# Patient Record
Sex: MALE | Race: WHITE | Employment: OTHER | URBAN - METROPOLITAN AREA
[De-identification: names, ages, dates, MRNs, and addresses within clinical notes are randomized per-mention and may not be internally consistent; named-entity substitution may affect disease eponyms.]

---

## 2017-01-06 ENCOUNTER — PRE VISIT (OUTPATIENT)
Dept: CARDIOLOGY | Facility: CLINIC | Age: 72
End: 2017-01-06

## 2017-01-09 ENCOUNTER — OFFICE VISIT (OUTPATIENT)
Dept: CARDIOLOGY | Facility: CLINIC | Age: 72
End: 2017-01-09
Payer: MEDICARE

## 2017-01-09 VITALS
HEART RATE: 80 BPM | SYSTOLIC BLOOD PRESSURE: 120 MMHG | HEIGHT: 68 IN | BODY MASS INDEX: 22.13 KG/M2 | DIASTOLIC BLOOD PRESSURE: 64 MMHG | WEIGHT: 146 LBS

## 2017-01-09 DIAGNOSIS — I48.92 ATRIAL FLUTTER, UNSPECIFIED TYPE (H): ICD-10-CM

## 2017-01-09 DIAGNOSIS — I36.1 NON-RHEUMATIC TRICUSPID VALVE INSUFFICIENCY: ICD-10-CM

## 2017-01-09 DIAGNOSIS — I48.19 PERSISTENT ATRIAL FIBRILLATION (H): ICD-10-CM

## 2017-01-09 DIAGNOSIS — I48.0 PAROXYSMAL ATRIAL FIBRILLATION (H): Primary | ICD-10-CM

## 2017-01-09 DIAGNOSIS — I10 ESSENTIAL HYPERTENSION: ICD-10-CM

## 2017-01-09 PROCEDURE — 99215 OFFICE O/P EST HI 40 MIN: CPT | Mod: 25 | Performed by: INTERNAL MEDICINE

## 2017-01-09 PROCEDURE — 93000 ELECTROCARDIOGRAM COMPLETE: CPT | Performed by: INTERNAL MEDICINE

## 2017-01-09 RX ORDER — TORSEMIDE 10 MG/1
5 TABLET ORAL DAILY
Qty: 30 TABLET | Refills: 11 | COMMUNITY
Start: 2017-01-09 | End: 2017-10-12

## 2017-01-09 RX ORDER — AMIODARONE HYDROCHLORIDE 200 MG/1
200 TABLET ORAL DAILY
Qty: 30 TABLET | Refills: 3 | COMMUNITY
Start: 2017-01-09 | End: 2017-03-20

## 2017-01-09 NOTE — MR AVS SNAPSHOT
After Visit Summary   1/9/2017    Misael Fischer    MRN: [id number]           Patient Information     Date Of Birth          1945        Visit Information        Provider Department      1/9/2017 12:15 PM Prabhakar Castro MD [facility]        Today's Diagnoses     Paroxysmal atrial fibrillation (H)    -  1     Atrial flutter, unspecified type (H)         Non-rheumatic tricuspid valve insufficiency         Essential hypertension         Persistent atrial fibrillation (H)            Follow-ups after your visit        Additional Services     Follow-Up with Cardiologist                 Your next 10 appointments already scheduled     Mar 03, 2017  8:30 AM   Ech Complete with [id number] ([facility])    [address]  [facility] [address] [phone]           1.  Please bring or wear a comfortable two-piece outfit. 2.  You may eat, drink and take your normal medicines. 3.  For any questions that cannot be answered, please contact the ordering physician ***Please check-in at the [facility] located in [address] in the [facility] building. When you are finished registering, please go to [address] and have a seat. The technician will call your name for the test.            Mar 06, 2017 12:15 PM   Return Visit with Prabhakar Castro MD   [facility] ([facility])    [address]  [facility] [id number] [phone]              Future tests that were ordered for you today     Open Future Orders        Priority Expected Expires Ordered    Echocardiogram Routine 2/8/2017 1/9/2018 1/9/2017    Follow-Up with Cardiologist Routine 2/8/2017 1/9/2018 1/9/2017            Who to contact     If you have questions or need follow up information about today's clinic visit or your schedule please contact  "[facility] [place] directly at [phone].  Normal or non-critical lab and imaging results will be communicated to you by MyChart, letter or phone within 4 business days after the clinic has received the results. If you do not hear from us within 7 days, please contact the clinic through Every1Mobilehart or phone. If you have a critical or abnormal lab result, we will notify you by phone as soon as possible.  Submit refill requests through Tap2print or call your pharmacy and they will forward the refill request to us. Please allow 3 business days for your refill to be completed.          Additional Information About Your Visit        Every1MobileharKigo Information     Tap2print gives you secure access to your electronic health record. If you see a primary care provider, you can also send messages to your care team and make appointments. If you have questions, please call your primary care clinic.  If you do not have a primary care provider, please call [phone] and they will assist you.        Care EveryWhere ID     This is your Care EveryWhere ID. This could be used by other organizations to access your [facility] medical records  [id number]        Your Vitals Were     Pulse Height BMI (Body Mass Index)             80 1.727 m (5' 8\") 22.20 kg/m2          Blood Pressure from Last 3 Encounters:   01/09/17 120/64   12/06/16 118/92   11/17/16 116/80    Weight from Last 3 Encounters:   01/09/17 66.225 kg (146 lb)   12/06/16 70.761 kg (156 lb)   11/17/16 69.718 kg (153 lb 11.2 oz)              We Performed the Following     EKG 12-lead complete w/read - Clinics (performed today)          Today's Medication Changes          These changes are accurate as of: 1/9/17  1:02 PM.  If you have any questions, ask your nurse or doctor.               These medicines have changed or have updated prescriptions.        Dose/Directions    amiodarone 200 MG tablet   Commonly known as:  PACERONE/CODARONE   This may have " changed:  Another medication with the same name was removed. Continue taking this medication, and follow the directions you see here.   Used for:  Paroxysmal atrial fibrillation (H)   Changed by:  Prabhakar Castro MD        Dose:  200 mg   Take 1 tablet (200 mg) by mouth daily   Quantity:  30 tablet   Refills:  3       torsemide 10 MG tablet   Commonly known as:  DEMADEX   This may have changed:  when to take this   Used for:  Non-rheumatic tricuspid valve insufficiency   Changed by:  Prabhakar Castro MD        Dose:  10 mg   Take 1 tablet (10 mg) by mouth daily Down to 5mg daily   Quantity:  30 tablet   Refills:  11         Stop taking these medicines if you haven't already. Please contact your care team if you have questions.     verapamil 120 MG 24 hr capsule   Commonly known as:  VERELAN   Stopped by:  Prabhakar Castro MD                Where to get your medicines      These medications were sent to [facility] - [address] [address]     Phone:  [phone]    - apixaban ANTICOAGULANT 5 MG tablet             Primary Care Provider Office Phone # Fax #    [facility] [phone] [address] [facility] [address]        Thank you!     Thank you for choosing [facility] [place]  for your care. Our goal is always to provide you with excellent care. Hearing back from our patients is one way we can continue to improve our services. Please take a few minutes to complete the written survey that you may receive in the mail after your visit with us. Thank you!             Your Updated Medication List - Protect others around you: Learn how to safely use, store and throw away your medicines at www.disposemymeds.org.          This list is accurate as of: 1/9/17  1:02 PM.  Always use your most recent med list.                   Brand Name Dispense Instructions for use     amiodarone 200 MG tablet    PACERONE/CODARONE    30 tablet    Take 1 tablet (200 mg) by mouth daily       apixaban ANTICOAGULANT 5 MG tablet    ELIQUIS    60 tablet    Take 1 tablet (5 mg) by mouth 2 times daily       torsemide 10 MG tablet    DEMADEX    30 tablet    Take 1 tablet (10 mg) by mouth daily Down to 5mg daily

## 2017-01-09 NOTE — Clinical Note
1/9/2017    [facility]  [address] [place] [address]    RE: Misael Simpsons       Dear Colleague,    I had the pleasure of seeing Misael Fischer in the [facility].    Mr. Fischer is a 71-year-old gentleman with a history of atrial flutter, status post flutter ablation, status post AVNRT ablation, tachycardia-induced cardiomyopathy with eventual improvement in his ejection fraction, RV dysfunction of unknown etiology which [facility], severe tricuspid regurgitation related to RV annular dilatation, along with chronic kidney disease who returns in close followup.      The patient had somehow become lost to followup for me and had been seeing physicians at the [facility] for his cardiac abnormalities.  He was initially scheduled to undergo tricuspid valve replacement with Dr. Mays; however, with medical therapy the patient was feeling better and had deferred on tricuspid valve replacement.  In reviewing the last note from Dr. Marcial from the Department of Cardiology at [facility] the patient was scheduled to have a repeat transthoracic echocardiogram and followup with the cardiologist at [facility] in roughly October.  He did not keep this appointment.  He was also scheduled to follow up with Nephrology as there was a question of possible light chain disease.  He also did not keep this followup.      Mr. Fischer has previously seen Dr. Ivan Larkin for his atrial flutter and he had a recurrence of his generalized malaise and some fluid retention.  He was worried that his atrial flutter had come back and therefore was seen by Dr. Larkin and was noted to be in atrial fibrillation.  He was set up with a KP-guided cardioversion, but on the KP the patient had evidence of left atrial appendage thrombus.  He was eventually placed on anticoagulation and a repeat transesophageal echocardiogram showed no evidence for persistent thrombus and he subsequently  underwent successful cardioversion by Dr. El in December.  He returns in close clinical followup and states that he has been feeling markedly better since the cardioversion.  His generalized malaise and fluid retention have improved.      The patient is currently on amiodarone 200 mg twice a day and was unaware that he was supposed to switch to 200 mg daily.  He has also been taking Eliquis since his cardioversion without any bleeding difficulties.      We performed a 12-lead ECG in the office today which shows sinus rhythm.      Please see my separate note with his full physical examination.     Outpatient Encounter Prescriptions as of 1/9/2017   Medication Sig Dispense Refill     apixaban ANTICOAGULANT (ELIQUIS) 5 MG tablet Take 1 tablet (5 mg) by mouth 2 times daily 60 tablet 11     amiodarone (PACERONE/CODARONE) 200 MG tablet Take 1 tablet (200 mg) by mouth daily 30 tablet 3     torsemide (DEMADEX) 10 MG tablet Take 1 tablet (10 mg) by mouth daily Down to 5mg daily 30 tablet 11     [DISCONTINUED] AMIODARONE HCL PO Take 200 mg by mouth 2 times daily       [DISCONTINUED] verapamil (VERELAN) 120 MG 24 hr capsule Take 1 capsule (120 mg) by mouth At Bedtime       [DISCONTINUED] torsemide (DEMADEX) 10 MG tablet Take 10 mg by mouth Down to 5mg daily       [DISCONTINUED] apixaban ANTICOAGULANT (ELIQUIS) 5 MG tablet Take 1 tablet (5 mg) by mouth 2 times daily 60 tablet 3     No facility-administered encounter medications on file as of 1/9/2017.      IMPRESSION AND PLAN:   1.  Paroxysmal atrial fibrillation - Mr. Fischer has an elevated CHADS-VASc score of at least 2 and given his prior left atrial appendage thrombus while in atrial fibrillation, I would recommend lifelong anticoagulation.  I did discuss this recommendation with the patient at length and he has agreed to remain on Eliquis.  I sent a prescription in for Eliquis 5 mg twice a day.  The patient is more than 60 kilograms and has never had a creatinine greater  than 2.5.  I believe that 5 mg b.i.d. dosing is the correct dose for the patient.  As he does not do well when he reverts to atrial fibrillation or atrial flutter I have elected to continue with amiodarone but have cut the dose to 200 mg daily.  When I see him back in roughly 1 month, we will consider going to 100 mg daily given his body habitus.   2.  LV dysfunction - Mr. Fischer had a slight worsening in his LV function when he reverted to atrial fibrillation.  His last assessment of his ejection fraction showed an EF of 45%.  I will plan to repeat a transthoracic echocardiogram in 4-6 weeks now that he is maintaining sinus rhythm.  I plan to see him back after this test has been completed.   3.  RV dysfunction - The patient had a comprehensive evaluation at both the [facility] and through [facility] to try to attempt to determine the etiology of his RV dysfunction.  It is now believed that it is likely related to progressive annular dilatation and tricuspid regurgitation.  I will plan to reassess his RV function and tricuspid regurgitation with his repeat transthoracic echocardiogram in 1 month.  Of note, the patient has previously turned down surgery for his tricuspid valve regurgitation at the [facility].  He has also not followed back with the cardiologist at the [facility] and would prefer to follow with us moving forward.   4.  Chronic kidney disease - The patient may have some question of light chain disease on his Nephrology workup.  At this time, I strongly encouraged the patient to follow back with the nephrologist at the [facility].  I have also asked him to establish with a primary care physician to help coordinate his care.   5.  Atrial flutter status post flutter ablation - There is no recurrent atrial flutter seen.   6.  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   7.  Right heart failure - The patient appears relatively euvolemic at this time.  I will continue his Demadex 10  mg daily unchanged at the present time.      Again, thank you for allowing me to participate in the care of your patient.      Sincerely,    Prabhakar Castro MD     [facility]

## 2017-01-09 NOTE — PROGRESS NOTES
HISTORY OF PRESENT ILLNESS:  Mr. Fischer is a 71-year-old gentleman with a history of atrial flutter, status post flutter ablation, status post AVNRT ablation, tachycardia-induced cardiomyopathy with eventual improvement in his ejection fraction, RV dysfunction of unknown etiology which has been evaluated at the [facility], severe tricuspid regurgitation related to RV annular dilatation, along with chronic kidney disease who returns in close followup.      The patient had somehow become lost to followup for me and had been seeing physicians at the [facility] for his cardiac abnormalities.  He was initially scheduled to undergo tricuspid valve replacement with Dr. Mays; however, with medical therapy the patient was feeling better and had deferred on tricuspid valve replacement.  In reviewing the last note from Dr. Marcial from the Department of Cardiology at [facility] the patient was scheduled to have a repeat transthoracic echocardiogram and followup with the cardiologist at [place] in roughly October.  He did not keep this appointment.  He was also scheduled to follow up with Nephrology as there was a question of possible light chain disease.  He also did not keep this followup.      Mr. Fischer has previously seen Dr. Ivan Larkin for his atrial flutter and he had a recurrence of his generalized malaise and some fluid retention.  He was worried that his atrial flutter had come back and therefore was seen by Dr. Larkin and was noted to be in atrial fibrillation.  He was set up with a KP-guided cardioversion, but on the KP the patient had evidence of left atrial appendage thrombus.  He was eventually placed on anticoagulation and a repeat transesophageal echocardiogram showed no evidence for persistent thrombus and he subsequently underwent successful cardioversion by Dr. El in December.  He returns in close clinical followup and states that he has been feeling markedly better since the cardioversion.  His generalized  malaise and fluid retention have improved.      The patient is currently on amiodarone 200 mg twice a day and was unaware that he was supposed to switch to 200 mg daily.  He has also been taking Eliquis since his cardioversion without any bleeding difficulties.      We performed a 12-lead ECG in the office today which shows sinus rhythm.      Please see my separate note with his full physical examination.      IMPRESSION AND PLAN:   1.  Paroxysmal atrial fibrillation - Mr. Fischer has an elevated CHADS-VASc score of at least 2 and given his prior left atrial appendage thrombus while in atrial fibrillation, I would recommend lifelong anticoagulation.  I did discuss this recommendation with the patient at length and he has agreed to remain on Eliquis.  I sent a prescription in for Eliquis 5 mg twice a day.  The patient is more than 60 kilograms and has never had a creatinine greater than 2.5.  I believe that 5 mg b.i.d. dosing is the correct dose for the patient.  As he does not do well when he reverts to atrial fibrillation or atrial flutter I have elected to continue with amiodarone but have cut the dose to 200 mg daily.  When I see him back in roughly 1 month, we will consider going to 100 mg daily given his body habitus.   2.  LV dysfunction - Mr. Fischer had a slight worsening in his LV function when he reverted to atrial fibrillation.  His last assessment of his ejection fraction showed an EF of 45%.  I will plan to repeat a transthoracic echocardiogram in 4-6 weeks now that he is maintaining sinus rhythm.  I plan to see him back after this test has been completed.   3.  RV dysfunction - The patient had a comprehensive evaluation at both the [facility] and through [facility] to try to attempt to determine the etiology of his RV dysfunction.  It is now believed that it is likely related to progressive annular dilatation and tricuspid regurgitation.  I will plan to reassess his RV function and tricuspid regurgitation  with his repeat transthoracic echocardiogram in 1 month.  Of note, the patient has previously turned down surgery for his tricuspid valve regurgitation at the [facility].  He has also not followed back with the cardiologist at the [facility] and would prefer to follow with us moving forward.   4.  Chronic kidney disease - The patient may have some question of light chain disease on his Nephrology workup.  At this time, I strongly encouraged the patient to follow back with the nephrologist at the [facility].  I have also asked him to establish with a primary care physician to help coordinate his care.   5.  Atrial flutter status post flutter ablation - There is no recurrent atrial flutter seen.   6.  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   7.  Right heart failure - The patient appears relatively euvolemic at this time.  I will continue his Demadex 10 mg daily unchanged at the present time.         TYRONE NEWBY MD             D: 2017 13:00   T: 2017 13:23   MT: DAWNA      Name:     TREY BURGER   MRN:      [id number]        Account:      [id number]   :      1945           Service Date: 2017      Document: [id number]

## 2017-01-09 NOTE — PROGRESS NOTES
HPI and Plan:   See dictation    Orders Placed This Encounter   Procedures     Follow-Up with Cardiologist     EKG 12-lead complete w/read - Clinics (performed today)     Echocardiogram       Orders Placed This Encounter   Medications     DISCONTD: AMIODARONE HCL PO     Sig: Take 200 mg by mouth 2 times daily     apixaban ANTICOAGULANT (ELIQUIS) 5 MG tablet     Sig: Take 1 tablet (5 mg) by mouth 2 times daily     Dispense:  60 tablet     Refill:  11     amiodarone (PACERONE/CODARONE) 200 MG tablet     Sig: Take 1 tablet (200 mg) by mouth daily     Dispense:  30 tablet     Refill:  3     torsemide (DEMADEX) 10 MG tablet     Sig: Take 1 tablet (10 mg) by mouth daily Down to 5mg daily     Dispense:  30 tablet     Refill:  11       Medications Discontinued During This Encounter   Medication Reason     verapamil (VERELAN) 120 MG 24 hr capsule      AMIODARONE HCL PO      apixaban ANTICOAGULANT (ELIQUIS) 5 MG tablet Reorder     torsemide (DEMADEX) 10 MG tablet Reorder         Encounter Diagnoses   Name Primary?     Atrial flutter, unspecified type (H)      Non-rheumatic tricuspid valve insufficiency      Essential hypertension      Paroxysmal atrial fibrillation (H) Yes     Persistent atrial fibrillation (H)        CURRENT MEDICATIONS:  Current Outpatient Prescriptions   Medication Sig Dispense Refill     apixaban ANTICOAGULANT (ELIQUIS) 5 MG tablet Take 1 tablet (5 mg) by mouth 2 times daily 60 tablet 11     amiodarone (PACERONE/CODARONE) 200 MG tablet Take 1 tablet (200 mg) by mouth daily 30 tablet 3     torsemide (DEMADEX) 10 MG tablet Take 1 tablet (10 mg) by mouth daily Down to 5mg daily 30 tablet 11     [DISCONTINUED] AMIODARONE HCL PO Take 200 mg by mouth 2 times daily       [DISCONTINUED] torsemide (DEMADEX) 10 MG tablet Take 10 mg by mouth Down to 5mg daily         ALLERGIES     Allergies   Allergen Reactions     Ace Inhibitors      Due to CKD, renal insufficiency       PAST MEDICAL HISTORY:  Past Medical History    Diagnosis Date     Pigment deposition      Elevated PSA      BPH (benign prostatic hyperplasia)      Atrial flutter (H) 11/23/15     Cardiomyopathy (H)      Echo 11/2015- EF 35-40%     Systolic heart failure (H)      Echo 11/2015- EF 35-40%     CAD (coronary artery disease)      Abnormal Nuc 2014     CKD (chronic kidney disease)      not on ACE/ARB due to insufficiency     Mild pulmonic regurgitation and RV dysfunction by prior echocardiogram      Tricuspid regurgitation      Mod 2016     Malaria      Atrial flutter (H)        PAST SURGICAL HISTORY:  Past Surgical History   Procedure Laterality Date     Orthopedic surgery Left 1957     foot injury repair     Cardioversion  12/14/15     H ablation atrial flutter  3/21/16     Anesthesia cardioversion N/A 12/6/2016     Procedure: ANESTHESIA CARDIOVERSION;  Surgeon: GENERIC ANESTHESIA PROVIDER;  Location:  OR     C yair (transesophageal echo)  12/6/16       FAMILY HISTORY:  Family History   Problem Relation Age of Onset     Prostate Cancer Father 73     C.A.D. Mother        SOCIAL HISTORY:  Social History     Social History     Marital Status:      Spouse Name: N/A     Number of Children: N/A     Years of Education: N/A     Social History Main Topics     Smoking status: Never Smoker      Smokeless tobacco: Never Used     Alcohol Use: Yes      Comment: rare     Drug Use: No     Sexual Activity: Not Asked     Other Topics Concern     Caffeine Concern No     Occupational Exposure No     none     Sleep Concern No     Stress Concern No     Weight Concern No     Special Diet Yes     low salt     Exercise Yes     walking     Seat Belt Yes     Social History Narrative       Review of Systems:  Skin:  Negative for       Eyes:  Positive for glasses    ENT:  Negative for      Respiratory:  Negative for       Cardiovascular:  Negative for      Gastroenterology: Negative for      Genitourinary:  not assessed      Musculoskeletal:  Negative      Neurologic:  Negative for     "  Psychiatric:  Negative for      Heme/Lymph/Imm:         Endocrine:  Negative        Physical Exam:  Vitals: /64 mmHg  Pulse 80  Ht 1.727 m (5' 8\")  Wt 66.225 kg (146 lb)  BMI 22.20 kg/m2    Constitutional:  alert and oriented;in no acute distress        Skin:  warm and dry to the touch        Head:  normocephalic        Eyes:  sclera white        ENT:  no pallor or cyanosis        Neck:  no stiffness        Chest:  clear to auscultation          Cardiac: regular rhythm;normal S1 and S2       systolic ejection murmur          Abdomen:  abdomen soft        Vascular: pulses full and equal                                        Extremities and Back:        trace trace      Neurological:  no gross motor deficits;affect appropriate              [facility]  [address]  [place] [address]                "

## 2017-03-03 ENCOUNTER — HOSPITAL ENCOUNTER (OUTPATIENT)
Dept: CARDIOLOGY | Facility: CLINIC | Age: 72
Discharge: HOME | End: 2017-03-03
Attending: INTERNAL MEDICINE | Admitting: INTERNAL MEDICINE
Payer: MEDICARE

## 2017-03-03 ENCOUNTER — DOCUMENTATION ONLY (OUTPATIENT)
Dept: CARDIOLOGY | Facility: CLINIC | Age: 72
End: 2017-03-03

## 2017-03-03 DIAGNOSIS — I48.0 PAROXYSMAL ATRIAL FIBRILLATION (H): ICD-10-CM

## 2017-03-03 DIAGNOSIS — I36.1 NON-RHEUMATIC TRICUSPID VALVE INSUFFICIENCY: ICD-10-CM

## 2017-03-03 PROCEDURE — 93306 TTE W/DOPPLER COMPLETE: CPT

## 2017-03-03 PROCEDURE — 93306 TTE W/DOPPLER COMPLETE: CPT | Mod: 26 | Performed by: INTERNAL MEDICINE

## 2017-03-03 NOTE — PROGRESS NOTES
Pt in clinic for echo.  He is on Eliquis for afib.  Per pt, he will not be receiving his mail order supply for another week and he only has 3 tabs left of his Eliquis 5mg.  Pt provided samples of Eliquis 2.5mg tab.  He is aware that he needs to take 2 tabs (5mg) twice daily and will call back next week for more samples should his mail order shipment not arrive.  Medication list provided.  Pt has phone number to call with further questions.  KMortimer RN

## 2017-03-06 ENCOUNTER — OFFICE VISIT (OUTPATIENT)
Dept: CARDIOLOGY | Facility: CLINIC | Age: 72
End: 2017-03-06
Attending: INTERNAL MEDICINE
Payer: MEDICARE

## 2017-03-06 VITALS
HEART RATE: 64 BPM | SYSTOLIC BLOOD PRESSURE: 126 MMHG | HEIGHT: 68 IN | DIASTOLIC BLOOD PRESSURE: 76 MMHG | BODY MASS INDEX: 23.19 KG/M2 | WEIGHT: 153 LBS

## 2017-03-06 DIAGNOSIS — I36.1 NON-RHEUMATIC TRICUSPID VALVE INSUFFICIENCY: ICD-10-CM

## 2017-03-06 DIAGNOSIS — I48.0 PAROXYSMAL ATRIAL FIBRILLATION (H): Primary | ICD-10-CM

## 2017-03-06 PROCEDURE — 99215 OFFICE O/P EST HI 40 MIN: CPT | Performed by: INTERNAL MEDICINE

## 2017-03-06 NOTE — PROGRESS NOTES
HPI and Plan:   See dictation    Orders Placed This Encounter   Procedures     Basic metabolic panel     Follow-Up with Cardiologist     Echocardiogram       No orders of the defined types were placed in this encounter.      There are no discontinued medications.      Encounter Diagnoses   Name Primary?     Non-rheumatic tricuspid valve insufficiency      Paroxysmal atrial fibrillation (H) Yes       CURRENT MEDICATIONS:  Current Outpatient Prescriptions   Medication Sig Dispense Refill     apixaban ANTICOAGULANT (ELIQUIS) 5 MG tablet Take 1 tablet (5 mg) by mouth 2 times daily 60 tablet 11     amiodarone (PACERONE/CODARONE) 200 MG tablet Take 1 tablet (200 mg) by mouth daily 30 tablet 3     torsemide (DEMADEX) 10 MG tablet Take 5 mg by mouth daily Down to 5mg daily 30 tablet 11       ALLERGIES     Allergies   Allergen Reactions     Ace Inhibitors      Due to CKD, renal insufficiency       PAST MEDICAL HISTORY:  Past Medical History   Diagnosis Date     Atrial flutter (H) 11/23/15     AVNRT (AV jade re-entry tachycardia) (H)      status post     BPH (benign prostatic hyperplasia)      CAD (coronary artery disease)      Abnormal Nuc 2014     Cardiomyopathy (H)      Echo 11/2015- EF 35-40%     CHF (congestive heart failure) (H) 11/25/2015     Echo 11/2015- EF 35-40%      CKD (chronic kidney disease)      not on ACE/ARB due to insufficiency     Elevated PSA      Hypercholesterolemia 7/10/2014     Malaria      Mild pulmonic regurgitation and RV dysfunction by prior echocardiogram      Non-rheumatic tricuspid valve insufficiency      Paroxysmal a-fib (H)      Persistent atrial fibrillation with rapid ventricular response (H)      Pigment deposition      Systolic heart failure (H)      Echo 11/2015- EF 35-40%     Tricuspid regurgitation      Mod 2016       PAST SURGICAL HISTORY:  Past Surgical History   Procedure Laterality Date     Orthopedic surgery Left 1957     foot injury repair     Cardioversion  12/14/15     H  "ablation atrial flutter  3/21/16     Anesthesia cardioversion N/A 12/6/2016     Procedure: ANESTHESIA CARDIOVERSION;  Surgeon: GENERIC ANESTHESIA PROVIDER;  Location:  OR      yair (transesophageal echo)  12/6/16       FAMILY HISTORY:  Family History   Problem Relation Age of Onset     Prostate Cancer Father 73     C.A.D. Mother        SOCIAL HISTORY:  Social History     Social History     Marital status:      Spouse name: N/A     Number of children: N/A     Years of education: N/A     Social History Main Topics     Smoking status: Never Smoker     Smokeless tobacco: Never Used     Alcohol use Yes      Comment: rare     Drug use: No     Sexual activity: Not on file     Other Topics Concern     Caffeine Concern No     Occupational Exposure No     none     Sleep Concern No     Stress Concern No     Weight Concern No     Special Diet Yes     low salt     Exercise Yes     walking     Seat Belt Yes     Social History Narrative       Review of Systems:  Skin:  Negative       Eyes:  Positive for glasses    ENT:  Negative      Respiratory:  Negative       Cardiovascular:  Negative      Gastroenterology: Negative      Genitourinary:  Negative      Musculoskeletal:  Negative      Neurologic:  Negative      Psychiatric:  Negative      Heme/Lymph/Imm:  Positive for allergies    Endocrine:  Negative        Physical Exam:  Vitals: /76 (BP Location: Right arm, Patient Position: Chair, Cuff Size: Adult Regular)  Pulse 64  Ht 1.727 m (5' 8\")  Wt 69.4 kg (153 lb)  BMI 23.26 kg/m2    Constitutional:  alert and oriented;in no acute distress thin      Skin:  warm and dry to the touch   fingers cool    Head:  normocephalic        Eyes:  sclera white        ENT:  no pallor or cyanosis        Neck:  no stiffness        Chest:  clear to auscultation basal rales        Cardiac: regular rhythm;normal S1 and S2         systolic murmur;grade 2        Abdomen:  abdomen soft   No HJR    Vascular: pulses full and equal               "                          Extremities and Back:            to knee    Neurological:  no gross motor deficits;affect appropriate              CC  Prabhakar Castro MD   [facility]  [address]  [place] [id number]

## 2017-03-06 NOTE — MR AVS SNAPSHOT
After Visit Summary   3/6/2017    Misael Fischer    MRN: [id number]           Patient Information     Date Of Birth          1945        Visit Information        Provider Department      3/6/2017 12:15 PM Prabhakar Castro MD [facility]        Today's Diagnoses     Paroxysmal atrial fibrillation (H)    -  1    Non-rheumatic tricuspid valve insufficiency           Follow-ups after your visit        Additional Services     Follow-Up with Cardiologist                 Future tests that were ordered for you today     Open Future Orders        Priority Expected Expires Ordered    Basic metabolic panel Routine 9/2/2017 3/6/2018 3/6/2017    Echocardiogram Routine 9/2/2017 3/6/2018 3/6/2017    Follow-Up with Cardiologist Routine 9/2/2017 3/6/2018 3/6/2017            Who to contact     If you have questions or need follow up information about today's clinic visit or your schedule please contact [facility] directly at [phone].  Normal or non-critical lab and imaging results will be communicated to you by [facility], letter or phone within 4 business days after the clinic has received the results. If you do not hear from us within 7 days, please contact the clinic through [employer] or phone. If you have a critical or abnormal lab result, we will notify you by phone as soon as possible.  Submit refill requests through [employer] or call your pharmacy and they will forward the refill request to us. Please allow 3 business days for your refill to be completed.          Additional Information About Your Visit        MyChart Information     [employer] gives you secure access to your electronic health record. If you see a primary care provider, you can also send messages to your care team and make appointments. If you have questions, please call your primary care clinic.  If you do not have a primary care provider, please call [phone]  "and they will assist you.        Care EveryWhere ID     This is your Care EveryWhere ID. This could be used by other organizations to access your [facility] medical records  [id number]        Your Vitals Were     Pulse Height BMI (Body Mass Index)             64 1.727 m (5' 8\") 23.26 kg/m2          Blood Pressure from Last 3 Encounters:   03/06/17 126/76   01/09/17 120/64   12/06/16 (!) 118/92    Weight from Last 3 Encounters:   03/06/17 69.4 kg (153 lb)   01/09/17 66.2 kg (146 lb)   12/06/16 70.8 kg (156 lb)              We Performed the Following     Follow-Up with Cardiologist        Primary Care Provider Office Phone # Fax #    Misael Stearns [phone]       XXX RETIRED XXX [address]  [facility] [address]        Thank you!     Thank you for choosing [facility] [place]  for your care. Our goal is always to provide you with excellent care. Hearing back from our patients is one way we can continue to improve our services. Please take a few minutes to complete the written survey that you may receive in the mail after your visit with us. Thank you!             Your Updated Medication List - Protect others around you: Learn how to safely use, store and throw away your medicines at www.disposemymeds.org.          This list is accurate as of: 3/6/17 12:59 PM.  Always use your most recent med list.                   Brand Name Dispense Instructions for use    amiodarone 200 MG tablet    PACERONE/CODARONE    30 tablet    Take 1 tablet (200 mg) by mouth daily       apixaban ANTICOAGULANT 5 MG tablet    ELIQUIS    60 tablet    Take 1 tablet (5 mg) by mouth 2 times daily       torsemide 10 MG tablet    DEMADEX    30 tablet    Take 5 mg by mouth daily Down to 5mg daily         "

## 2017-03-06 NOTE — LETTER
3/6/2017    Misael Stearns MD  [address]  [facility] [address]    RE: Misael Fischer       Dear Colleague,    I had the pleasure of seeing Misael Fischer in the [facility].    Mr. Fischer is a pleasant 71-year-old gentleman with a history of atrial flutter status post flutter ablation with a prior history of a tachycardia-induced cardiomyopathy with an eventual improvement in his ejection fraction, RV dysfunction of unknown etiology through which [facility] and significant tricuspid regurgitation related to RV annular dilatation along with chronic kidney disease who returns in close clinical followup.      The patient has been feeling well since we have put him back into sinus rhythm.  Most recently he had recurrent atrial fibrillation and is currently on amiodarone and apixaban.  At our last visit I had taken his amiodarone from 400 to 200 mg daily.      The patient states that he has been feeling the best he has felt in the last several years.  He denies any chest pain, pressure, shortness of breath, orthopnea or paroxysmal nocturnal dyspnea.      Of note, on a prior transesophageal echocardiogram he was noted to have a left atrial appendage thrombus and we have elected to keep him on lifelong anticoagulation with apixaban.  He has been tolerating the apixaban well.      The patient relates to me that he has been taking his furosemide as needed, and despite doing this, he has been having no difficulties with right heart failure symptoms.      The patient underwent a repeat transthoracic echocardiogram prior to this visit.  This showed that his ejection fraction has normalized since we have put him back into sinus rhythm.  He still has evidence of RV volume/pressure overload and mild RV decreased systolic function with a moderately dilated right ventricle.  There was 2-3+ tricuspid regurgitation.  In comparison with the prior transthoracic  echocardiogram his RV function and the tricuspid regurgitation were unchanged.  However, his LV function has improved.      Please see my separate note with his full physical examination.     Outpatient Encounter Prescriptions as of 3/6/2017   Medication Sig Dispense Refill     apixaban ANTICOAGULANT (ELIQUIS) 5 MG tablet Take 1 tablet (5 mg) by mouth 2 times daily 60 tablet 11     torsemide (DEMADEX) 10 MG tablet Take 5 mg by mouth daily Down to 5mg daily 30 tablet 11     [DISCONTINUED] amiodarone (PACERONE/CODARONE) 200 MG tablet Take 1 tablet (200 mg) by mouth daily 30 tablet 3     No facility-administered encounter medications on file as of 3/6/2017.       IMPRESSION AND PLAN:   1.  Paroxysmal atrial fibrillation.  Mr. Fischer remains in sinus rhythm on physical examination at today's visit.  He clearly does not do well when he goes into atrial arrhythmias with a reduction in his EF when he was in atrial flutter and a reduction when he was in atrial fibrillation.   At this time, I discussed long-term anticoagulation where the patient will continue Eliquis lifelong unchanged.  He has had no bleeding difficulties with the Eliquis.  Concerning the use of amiodarone as he does so poorly in atrial fibrillation, we discussed the risks and benefits of long-term amiodarone therapy.  At this time, the patient has elected to remain on amiodarone.  I will continue 200 mg daily.  He is being followed through the amiodarone monitoring protocol through [facility] and will require annual testing.   2.  RV dysfunction.  Mr. Fischer continues to have evidence of RV dysfunction with a moderately dilated right ventricle and mildly decreased RV systolic function.  There is also associated tricuspid regurgitation.  He has previously turned down surgery at the [facility] for his tricuspid regurgitation.  Currently, he is not manifesting any signs or symptoms of right heart failure and since he has been placed back into  sinus rhythm, has been requiring less diuretic therapy.  I did discuss having the patient meet with our surgeon, Dr. Kidd, for consideration of a tricuspid annular surgery.  However, the patient would like to withhold for now.  I think this is reasonable.  I have asked him to come back and see me in 6 months with a repeat transthoracic echocardiogram to be completed at that time.   3.  Nonischemic cardiomyopathy.  This appears to be tachycardia induced and now that the patient is back in sinus rhythm his LV function has improved.     Again, thank you for allowing me to participate in the care of your patient.      Sincerely,    Prabhakar Castro MD     [facility]

## 2017-03-07 NOTE — PROGRESS NOTES
HISTORY OF PRESENT ILLNESS:  Mr. Fischer is a pleasant 71-year-old gentleman with a history of atrial flutter status post flutter ablation with a prior history of a tachycardia-induced cardiomyopathy with an eventual improvement in his ejection fraction, RV dysfunction of unknown etiology through which he has been evaluated at the [facility] and significant tricuspid regurgitation related to RV annular dilatation along with chronic kidney disease who returns in close clinical followup.      The patient has been feeling well since we have put him back into sinus rhythm.  Most recently he had recurrent atrial fibrillation and is currently on amiodarone and apixaban.  At our last visit I had taken his amiodarone from 400 to 200 mg daily.      The patient states that he has been feeling the best he has felt in the last several years.  He denies any chest pain, pressure, shortness of breath, orthopnea or paroxysmal nocturnal dyspnea.      Of note, on a prior transesophageal echocardiogram he was noted to have a left atrial appendage thrombus and we have elected to keep him on lifelong anticoagulation with apixaban.  He has been tolerating the apixaban well.      The patient relates to me that he has been taking his furosemide as needed, and despite doing this, he has been having no difficulties with right heart failure symptoms.      The patient underwent a repeat transthoracic echocardiogram prior to this visit.  This showed that his ejection fraction has normalized since we have put him back into sinus rhythm.  He still has evidence of RV volume/pressure overload and mild RV decreased systolic function with a moderately dilated right ventricle.  There was 2-3+ tricuspid regurgitation.  In comparison with the prior transthoracic echocardiogram his RV function and the tricuspid regurgitation were unchanged.  However, his LV function has improved.      Please see my separate note with his full physical examination.       IMPRESSION AND PLAN:   1.  Paroxysmal atrial fibrillation.  Mr. Burger remains in sinus rhythm on physical examination at today's visit.  He clearly does not do well when he goes into atrial arrhythmias with a reduction in his EF when he was in atrial flutter and a reduction when he was in atrial fibrillation.   At this time, I discussed long-term anticoagulation where the patient will continue Eliquis lifelong unchanged.  He has had no bleeding difficulties with the Eliquis.  Concerning the use of amiodarone as he does so poorly in atrial fibrillation, we discussed the risks and benefits of long-term amiodarone therapy.  At this time, the patient has elected to remain on amiodarone.  I will continue 200 mg daily.  He is being followed through the amiodarone monitoring protocol through [facility] and will require annual testing.   2.  RV dysfunction.  Mr. Burger continues to have evidence of RV dysfunction with a moderately dilated right ventricle and mildly decreased RV systolic function.  There is also associated tricuspid regurgitation.  He has previously turned down surgery at the [facility] for his tricuspid regurgitation.  Currently, he is not manifesting any signs or symptoms of right heart failure and since he has been placed back into sinus rhythm, has been requiring less diuretic therapy.  I did discuss having the patient meet with our surgeon, Dr. Kidd, for consideration of a tricuspid annular surgery.  However, the patient would like to withhold for now.  I think this is reasonable.  I have asked him to come back and see me in 6 months with a repeat transthoracic echocardiogram to be completed at that time.   3.  Nonischemic cardiomyopathy.  This appears to be tachycardia induced and now that the patient is back in sinus rhythm his LV function has improved.         TYRONE NEWBY MD             D: 03/06/2017 13:41   T: 03/06/2017 21:59   MT: NADEEM      Name:     TREY BURGER   MRN:       [id number]        Account:      [id number]   :      1945           Service Date: 2017      Document: [id number]

## 2017-03-20 DIAGNOSIS — I48.0 PAROXYSMAL ATRIAL FIBRILLATION (H): ICD-10-CM

## 2017-03-20 RX ORDER — AMIODARONE HYDROCHLORIDE 200 MG/1
200 TABLET ORAL DAILY
Qty: 30 TABLET | Refills: 3 | Status: SHIPPED | OUTPATIENT
Start: 2017-03-20 | End: 2017-07-13

## 2017-04-24 ENCOUNTER — TRANSFERRED RECORDS (OUTPATIENT)
Dept: CARDIOLOGY | Facility: CLINIC | Age: 72
End: 2017-04-24

## 2017-04-24 ENCOUNTER — TRANSFERRED RECORDS (OUTPATIENT)
Dept: FAMILY MEDICINE | Facility: CLINIC | Age: 72
End: 2017-04-24

## 2017-05-31 DIAGNOSIS — I48.19 PERSISTENT ATRIAL FIBRILLATION (H): ICD-10-CM

## 2017-07-13 DIAGNOSIS — I48.0 PAROXYSMAL ATRIAL FIBRILLATION (H): ICD-10-CM

## 2017-07-13 RX ORDER — AMIODARONE HYDROCHLORIDE 200 MG/1
200 TABLET ORAL DAILY
Qty: 30 TABLET | Refills: 1 | Status: SHIPPED | OUTPATIENT
Start: 2017-07-13 | End: 2017-09-12

## 2017-07-13 NOTE — TELEPHONE ENCOUNTER
Ok for 60 day supply. Patient is due to have 6 month follow up with Dr Castro 9/2017 with repeat ECHO. Last visit 3/6/17

## 2017-07-26 DIAGNOSIS — I48.19 PERSISTENT ATRIAL FIBRILLATION (H): ICD-10-CM

## 2017-07-27 ENCOUNTER — TELEPHONE (OUTPATIENT)
Dept: CARDIOLOGY | Facility: CLINIC | Age: 72
End: 2017-07-27

## 2017-07-27 DIAGNOSIS — I48.19 PERSISTENT ATRIAL FIBRILLATION (H): ICD-10-CM

## 2017-07-27 NOTE — TELEPHONE ENCOUNTER
Misael called this morning trying to sort out his eliquis refills as he is apparently having a lot of trouble requesting medications through [facility]. He states he spoke with someone in our office yesterday, but there is no documentation in the chart to reflect that. Misael is on his last day of eliquis and urgently needs a refill, I sent in a script for 90 days with 3 refills to [facility]. To hold him over until those arrive, I set him up with 2 weeks worth of samples and he will pick those up today while he is running errands. No further needs/questions at this time. CLIFFORD Longoria

## 2017-09-12 DIAGNOSIS — I48.0 PAROXYSMAL ATRIAL FIBRILLATION (H): ICD-10-CM

## 2017-09-12 RX ORDER — AMIODARONE HYDROCHLORIDE 200 MG/1
200 TABLET ORAL DAILY
Qty: 30 TABLET | Refills: 3 | Status: SHIPPED | OUTPATIENT
Start: 2017-09-12 | End: 2017-12-04

## 2017-10-12 DIAGNOSIS — I36.1 NON-RHEUMATIC TRICUSPID VALVE INSUFFICIENCY: ICD-10-CM

## 2017-10-12 RX ORDER — TORSEMIDE 5 MG/1
5 TABLET ORAL DAILY
Qty: 90 TABLET | Refills: 1 | Status: SHIPPED | OUTPATIENT
Start: 2017-10-12 | End: 2018-04-03

## 2017-11-15 DIAGNOSIS — I10 ESSENTIAL HYPERTENSION: Primary | ICD-10-CM

## 2017-11-15 DIAGNOSIS — N18.9 CHRONIC KIDNEY DISEASE: ICD-10-CM

## 2017-11-15 DIAGNOSIS — I50.20 SYSTOLIC CONGESTIVE HEART FAILURE, UNSPECIFIED CONGESTIVE HEART FAILURE CHRONICITY: ICD-10-CM

## 2017-11-15 DIAGNOSIS — I48.91 ATRIAL FIBRILLATION (H): ICD-10-CM

## 2017-11-17 ENCOUNTER — OFFICE VISIT (OUTPATIENT)
Dept: CARDIOLOGY | Facility: CLINIC | Age: 72
End: 2017-11-17
Attending: INTERNAL MEDICINE
Payer: MEDICARE

## 2017-11-17 ENCOUNTER — TELEPHONE (OUTPATIENT)
Dept: CARDIOLOGY | Facility: CLINIC | Age: 72
End: 2017-11-17

## 2017-11-17 ENCOUNTER — HOSPITAL ENCOUNTER (OUTPATIENT)
Dept: CARDIOLOGY | Facility: CLINIC | Age: 72
Discharge: HOME | End: 2017-11-17
Attending: INTERNAL MEDICINE | Admitting: INTERNAL MEDICINE
Payer: MEDICARE

## 2017-11-17 VITALS
HEART RATE: 72 BPM | WEIGHT: 151.4 LBS | DIASTOLIC BLOOD PRESSURE: 76 MMHG | HEIGHT: 68 IN | SYSTOLIC BLOOD PRESSURE: 120 MMHG | BODY MASS INDEX: 22.94 KG/M2

## 2017-11-17 DIAGNOSIS — I50.20 SYSTOLIC CONGESTIVE HEART FAILURE, UNSPECIFIED CONGESTIVE HEART FAILURE CHRONICITY: Primary | ICD-10-CM

## 2017-11-17 DIAGNOSIS — I42.9 CARDIOMYOPATHY (H): ICD-10-CM

## 2017-11-17 DIAGNOSIS — I99.8 OTHER DISORDER OF CIRCULATORY SYSTEM (CODE): ICD-10-CM

## 2017-11-17 DIAGNOSIS — I42.0 DILATED CARDIOMYOPATHY (H): Primary | ICD-10-CM

## 2017-11-17 DIAGNOSIS — I48.0 PAROXYSMAL ATRIAL FIBRILLATION (H): ICD-10-CM

## 2017-11-17 DIAGNOSIS — I36.1 NON-RHEUMATIC TRICUSPID VALVE INSUFFICIENCY: ICD-10-CM

## 2017-11-17 DIAGNOSIS — N18.9 CHRONIC KIDNEY DISEASE: ICD-10-CM

## 2017-11-17 DIAGNOSIS — I42.9 CARDIOMYOPATHY (H): Primary | ICD-10-CM

## 2017-11-17 DIAGNOSIS — I48.91 ATRIAL FIBRILLATION (H): ICD-10-CM

## 2017-11-17 LAB
ALBUMIN SERPL-MCNC: 3.6 G/DL (ref 3.4–5)
ALP SERPL-CCNC: 133 U/L (ref 40–150)
ALT SERPL W P-5'-P-CCNC: 35 U/L (ref 0–70)
ANION GAP SERPL CALCULATED.3IONS-SCNC: 2 MMOL/L (ref 3–14)
ANION GAP SERPL CALCULATED.3IONS-SCNC: 5 MMOL/L (ref 3–14)
APTT PPP: 32 SEC (ref 22–37)
AST SERPL W P-5'-P-CCNC: 41 U/L (ref 0–45)
BILIRUB SERPL-MCNC: 0.8 MG/DL (ref 0.2–1.3)
BUN SERPL-MCNC: 31 MG/DL (ref 7–30)
BUN SERPL-MCNC: 31 MG/DL (ref 7–30)
CALCIUM SERPL-MCNC: 9 MG/DL (ref 8.5–10.1)
CALCIUM SERPL-MCNC: 9.7 MG/DL (ref 8.5–10.1)
CHLORIDE SERPL-SCNC: 103 MMOL/L (ref 94–109)
CHLORIDE SERPL-SCNC: 104 MMOL/L (ref 94–109)
CO2 SERPL-SCNC: 29 MMOL/L (ref 20–32)
CO2 SERPL-SCNC: 30 MMOL/L (ref 20–32)
CREAT SERPL-MCNC: 1.7 MG/DL (ref 0.66–1.25)
CREAT SERPL-MCNC: 1.77 MG/DL (ref 0.66–1.25)
ERYTHROCYTE [DISTWIDTH] IN BLOOD BY AUTOMATED COUNT: 14.2 % (ref 10–15)
GFR SERPL CREATININE-BSD FRML MDRD: 38 ML/MIN/1.7M2
GFR SERPL CREATININE-BSD FRML MDRD: 40 ML/MIN/1.7M2
GLUCOSE SERPL-MCNC: 101 MG/DL (ref 70–99)
GLUCOSE SERPL-MCNC: 89 MG/DL (ref 70–99)
HCT VFR BLD AUTO: 50.8 % (ref 40–53)
HGB BLD-MCNC: 16.8 G/DL (ref 13.3–17.7)
INR PPP: 1.16 (ref 0.86–1.14)
MCH RBC QN AUTO: 32.4 PG (ref 26.5–33)
MCHC RBC AUTO-ENTMCNC: 33.1 G/DL (ref 31.5–36.5)
MCV RBC AUTO: 98 FL (ref 78–100)
PLATELET # BLD AUTO: 168 10E9/L (ref 150–450)
POTASSIUM SERPL-SCNC: 5 MMOL/L (ref 3.4–5.3)
POTASSIUM SERPL-SCNC: 5.1 MMOL/L (ref 3.4–5.3)
POTASSIUM SERPL-SCNC: 6.1 MMOL/L (ref 3.4–5.3)
PROT SERPL-MCNC: 7.2 G/DL (ref 6.8–8.8)
RBC # BLD AUTO: 5.19 10E12/L (ref 4.4–5.9)
SODIUM SERPL-SCNC: 136 MMOL/L (ref 133–144)
SODIUM SERPL-SCNC: 137 MMOL/L (ref 133–144)
TSH SERPL DL<=0.005 MIU/L-ACNC: 31.97 MU/L (ref 0.4–4)
WBC # BLD AUTO: 4.6 10E9/L (ref 4–11)

## 2017-11-17 PROCEDURE — 80053 COMPREHEN METABOLIC PANEL: CPT | Performed by: INTERNAL MEDICINE

## 2017-11-17 PROCEDURE — 93306 TTE W/DOPPLER COMPLETE: CPT | Mod: 26 | Performed by: INTERNAL MEDICINE

## 2017-11-17 PROCEDURE — 85730 THROMBOPLASTIN TIME PARTIAL: CPT | Performed by: INTERNAL MEDICINE

## 2017-11-17 PROCEDURE — 99214 OFFICE O/P EST MOD 30 MIN: CPT | Performed by: INTERNAL MEDICINE

## 2017-11-17 PROCEDURE — 85027 COMPLETE CBC AUTOMATED: CPT | Performed by: INTERNAL MEDICINE

## 2017-11-17 PROCEDURE — 85610 PROTHROMBIN TIME: CPT | Performed by: INTERNAL MEDICINE

## 2017-11-17 PROCEDURE — 93306 TTE W/DOPPLER COMPLETE: CPT

## 2017-11-17 PROCEDURE — 93000 ELECTROCARDIOGRAM COMPLETE: CPT | Performed by: INTERNAL MEDICINE

## 2017-11-17 PROCEDURE — 36415 COLL VENOUS BLD VENIPUNCTURE: CPT | Performed by: INTERNAL MEDICINE

## 2017-11-17 PROCEDURE — 84132 ASSAY OF SERUM POTASSIUM: CPT | Performed by: INTERNAL MEDICINE

## 2017-11-17 PROCEDURE — 84443 ASSAY THYROID STIM HORMONE: CPT | Performed by: INTERNAL MEDICINE

## 2017-11-17 RX ORDER — SODIUM CHLORIDE 9 MG/ML
INJECTION, SOLUTION INTRAVENOUS CONTINUOUS
Status: CANCELLED | OUTPATIENT
Start: 2017-11-17

## 2017-11-17 RX ORDER — ASPIRIN 81 MG/1
325 TABLET ORAL DAILY
Status: CANCELLED | OUTPATIENT
Start: 2017-11-17

## 2017-11-17 RX ORDER — LIDOCAINE 40 MG/G
CREAM TOPICAL
Status: CANCELLED | OUTPATIENT
Start: 2017-11-17

## 2017-11-17 RX ORDER — METOPROLOL SUCCINATE 25 MG/1
12.5 TABLET, EXTENDED RELEASE ORAL DAILY
Qty: 30 TABLET | Refills: 11 | Status: SHIPPED | OUTPATIENT
Start: 2017-11-17 | End: 2017-12-14

## 2017-11-17 RX ORDER — POTASSIUM CHLORIDE 750 MG/1
20 TABLET, EXTENDED RELEASE ORAL
Status: CANCELLED | OUTPATIENT
Start: 2017-11-17

## 2017-11-17 NOTE — PROGRESS NOTES
HPI and Plan:   See dictation    Orders Placed This Encounter   Procedures     Follow-Up with Cardiologist     EKG 12-lead complete w/read - Clinics (performed today)       Orders Placed This Encounter   Medications     metoprolol (TOPROL-XL) 25 MG 24 hr tablet     Sig: Take 0.5 tablets (12.5 mg) by mouth daily     Dispense:  30 tablet     Refill:  11       There are no discontinued medications.      Encounter Diagnoses   Name Primary?     Non-rheumatic tricuspid valve insufficiency      Paroxysmal atrial fibrillation (H)      Dilated cardiomyopathy (H) Yes       CURRENT MEDICATIONS:  Current Outpatient Prescriptions   Medication Sig Dispense Refill     metoprolol (TOPROL-XL) 25 MG 24 hr tablet Take 0.5 tablets (12.5 mg) by mouth daily 30 tablet 11     torsemide (DEMADEX) 5 MG tablet Take 1 tablet (5 mg) by mouth daily Down to 5mg daily 90 tablet 1     amiodarone (PACERONE/CODARONE) 200 MG tablet Take 1 tablet (200 mg) by mouth daily 30 tablet 3     apixaban ANTICOAGULANT (ELIQUIS) 5 MG tablet Take 1 tablet (5 mg) by mouth 2 times daily 180 tablet 3       ALLERGIES     Allergies   Allergen Reactions     Ace Inhibitors      Due to CKD, renal insufficiency       PAST MEDICAL HISTORY:  Past Medical History:   Diagnosis Date     Atrial flutter (H) 11/23/15     AVNRT (AV jade re-entry tachycardia) (H)     status post     BPH (benign prostatic hyperplasia)      CAD (coronary artery disease)     Abnormal Nuc 2014     Cardiomyopathy (H)     Echo 11/2015- EF 35-40%     CHF (congestive heart failure) (H) 11/25/2015    Echo 11/2015- EF 35-40%      CKD (chronic kidney disease)     not on ACE/ARB due to insufficiency     Elevated PSA      Hypercholesterolemia 7/10/2014     Malaria      Mild pulmonic regurgitation and RV dysfunction by prior echocardiogram      Non-rheumatic tricuspid valve insufficiency      Paroxysmal a-fib (H)      Persistent atrial fibrillation with rapid ventricular response (H)      Pigment deposition       "Systolic heart failure (H)     Echo 11/2015- EF 35-40%     Tricuspid regurgitation     Mod 2016       PAST SURGICAL HISTORY:  Past Surgical History:   Procedure Laterality Date     ANESTHESIA CARDIOVERSION N/A 12/6/2016    Procedure: ANESTHESIA CARDIOVERSION;  Surgeon: GENERIC ANESTHESIA PROVIDER;  Location:  OR     C KP (TRANSESOPHAGEAL ECHO)  12/6/16     CARDIOVERSION  12/14/15     H ABLATION ATRIAL FLUTTER  3/21/16     ORTHOPEDIC SURGERY Left 1957    foot injury repair       FAMILY HISTORY:  Family History   Problem Relation Age of Onset     C.A.D. Mother      Prostate Cancer Father 73       SOCIAL HISTORY:  Social History     Social History     Marital status:      Spouse name: N/A     Number of children: N/A     Years of education: N/A     Social History Main Topics     Smoking status: Never Smoker     Smokeless tobacco: Never Used     Alcohol use Yes      Comment: rare     Drug use: No     Sexual activity: Not Currently     Other Topics Concern     Caffeine Concern No     Occupational Exposure No     none     Sleep Concern No     Stress Concern No     Weight Concern No     Special Diet Yes     low salt     Exercise Yes     walking     Seat Belt Yes     Social History Narrative       Review of Systems:  Skin:  Negative       Eyes:  Positive for glasses    ENT:  Negative      Respiratory:  Positive for dyspnea on exertion if walks to fast, then gets some SOB    Cardiovascular:  Negative      Gastroenterology: Negative      Genitourinary:  Negative      Musculoskeletal:         Neurologic:  Positive for numbness or tingling of hands    Psychiatric:         Heme/Lymph/Imm:  Negative      Endocrine:  Negative        Physical Exam:  Vitals: /76 (BP Location: Right arm, Patient Position: Sitting, Cuff Size: Adult Regular)  Pulse 72  Ht 1.727 m (5' 8\")  Wt 68.7 kg (151 lb 6.4 oz)  BMI 23.02 kg/m2    Constitutional:  cooperative;well nourished;in no acute distress        Skin:  warm and dry to the " touch          Head:  normocephalic        Eyes:  sclera white        Lymph:      ENT:  no pallor or cyanosis        Neck:  no stiffness        Respiratory:  clear to auscultation         Cardiac: regular rhythm       systolic ejection murmur                                                 GI:  abdomen soft        Extremities and Muscular Skeletal:        trace trace      Neurological:  affect appropriate        Psych:  affect appropriate, oriented to time, person and place        CC  Prabhakar Castro MD  [address]  [place] [address]

## 2017-11-17 NOTE — MR AVS SNAPSHOT
After Visit Summary   11/17/2017    Misael Fischer    MRN: [id number]           Patient Information     Date Of Birth          1945        Visit Information        Provider Department      11/17/2017 10:45 AM Prabhakar Castro MD [facility]        Today's Diagnoses     Dilated cardiomyopathy (H)    -  1    Non-rheumatic tricuspid valve insufficiency        Paroxysmal atrial fibrillation (H)           Follow-ups after your visit        Additional Services     Follow-Up with Cardiologist                 Your next 10 appointments already scheduled     Nov 17, 2017 12:00 PM CST   LAB with RU LAB   [facility] ([facility])    [address] [place] [address]   [phone]           Please do not eat 10-12 hours before your appointment if you are coming in fasting for labs on lipids, cholesterol, or glucose (sugar). This does not apply to pregnant women. Water, hot tea and black coffee (with nothing added) are okay. Do not drink other fluids, diet soda or chew gum.            Nov 21, 2017 10:00 AM CST   Cath 90 Minute with SHCVR3   [facility] ([facility])    [address]  [place] [address]   [phone]            Dec 04, 2017  1:15 PM CST   Return Visit with Prabhakar Castro MD   [facility] ([facility])    [address] [place] [id number] [phone]              Future tests that were ordered for you today     Open Future Orders        Priority Expected Expires Ordered    Basic metabolic panel Routine 11/17/2017 11/17/2018 11/17/2017    INR Routine 11/17/2017 11/17/2018 11/17/2017    CBC with platelets Routine 11/17/2017 11/17/2018 11/17/2017    Partial thromboplastin time Routine 11/17/2017 11/17/2018 11/17/2017    Follow-Up with Cardiologist Routine  "12/4/2017 11/17/2018 11/17/2017    Cardiac Cath: Coronary Angiography Adult Order Routine 11/24/2017 11/17/2018 11/17/2017            Who to contact     If you have questions or need follow up information about today's clinic visit or your schedule please [facility] directly at [phone].  Normal or non-critical lab and imaging results will be communicated to you by MyChart, letter or phone within 4 business days after the clinic has received the results. If you do not hear from us within 7 days, please contact the clinic through [employer] or phone. If you have a critical or abnormal lab result, we will notify you by phone as soon as possible.  Submit refill requests through [employer] or call your pharmacy and they will forward the refill request to us. Please allow 3 business days for your refill to be completed.          Additional Information About Your Visit        [employer] Information     [employer] gives you secure access to your electronic health record. If you see a primary care provider, you can also send messages to your care team and make appointments. If you have questions, please call your primary care clinic.  If you do not have a primary care provider, please call [phone] and they will assist you.        Care EveryWhere ID     This is your Care EveryWhere ID. This could be used by other organizations to access your [place] medical records  [id number]        Your Vitals Were     Pulse Height BMI (Body Mass Index)             72 1.727 m (5' 8\") 23.02 kg/m2          Blood Pressure from Last 3 Encounters:   11/17/17 120/76   03/06/17 126/76   01/09/17 120/64    Weight from Last 3 Encounters:   11/17/17 68.7 kg (151 lb 6.4 oz)   03/06/17 69.4 kg (153 lb)   01/09/17 66.2 kg (146 lb)              We Performed the Following     EKG 12-lead complete w/read - Clinics (performed today)     Follow-Up with Cardiologist          Today's Medication Changes       "    These changes are accurate as of: 11/17/17 11:50 AM.  If you have any questions, ask your nurse or doctor.               Start taking these medicines.        Dose/Directions    metoprolol 25 MG 24 hr tablet   Commonly known as:  TOPROL-XL   Used for:  Dilated cardiomyopathy (H)   Started by:  Prabhakar Castro MD        Dose:  12.5 mg   Take 0.5 tablets (12.5 mg) by mouth daily   Quantity:  30 tablet   Refills:  11            Where to get your medicines      These medications were sent to [facility] [address] [facility] - [place], [place] - [address]  [address] [address], [facility] [address]     Phone:  [phone]     metoprolol 25 MG 24 hr tablet                Primary Care Provider Office Phone # Fax #    Kevan Bull [phone] [address]  [facility] [address]        Equal Access to Services     [facility]: Hadii aad ku hadasho Soomaali, waaxda luqadaha, qaybta kaalmada adeegyada, waxay ambrocioin hayrayn sangeeta durantarabret reis . So [facility] [phone].    ATENCIÓN: Si habla español, tiene a stewart disposición servicios gratuitos de asistencia lingüística. Roxanne al [phone].    We comply with applicable federal civil rights laws and [place] laws. We do not discriminate on the basis of race, color, national origin, age, disability, sex, sexual orientation, or gender identity.            Thank you!     Thank you for choosing [facility]  for your care. Our goal is always to provide you with excellent care. Hearing back from our patients is one way we can continue to improve our services. Please take a few minutes to complete the written survey that you may receive in the mail after your visit with us. Thank you!             Your Updated Medication List - Protect others around you: Learn how to safely use, store and throw away your medicines at www.disposemymeds.org.          This list is accurate as of: 11/17/17 11:50 AM.  Always use  your most recent med list.                   Brand Name Dispense Instructions for use Diagnosis    amiodarone 200 MG tablet    PACERONE/CODARONE    30 tablet    Take 1 tablet (200 mg) by mouth daily    Paroxysmal atrial fibrillation (H)       apixaban ANTICOAGULANT 5 MG tablet    ELIQUIS    180 tablet    Take 1 tablet (5 mg) by mouth 2 times daily    Persistent atrial fibrillation (H)       metoprolol 25 MG 24 hr tablet    TOPROL-XL    30 tablet    Take 0.5 tablets (12.5 mg) by mouth daily    Dilated cardiomyopathy (H)       torsemide 5 MG tablet    DEMADEX    90 tablet    Take 1 tablet (5 mg) by mouth daily Down to 5mg daily    Non-rheumatic tricuspid valve insufficiency

## 2017-11-17 NOTE — PATIENT INSTRUCTIONS
Left and Right HEART CATH PREP INSTRUCTIONS    Patient is scheduled for a right and left heart cath at [facility], on 11/21/17.  Check in time is at 6am and procedure time is at 10am.    Patient should not eat or drink after midnight on 11/20/17 .      Patient should hold Torsemide on the morning of the procedure, patient to take it after the procedure.     Patient is not taking insulin. Patient is not taking Metformin or other diabetic medications.     Patient should hold Eliquis for 3 days before the procedure, starting on 11/18/17. Patient can resume at Cardiologist's discretion.    Patient should take 325mg of Aspirin the day before the procedure and the morning of the procedure.     Patient should take the rest of their medication after the procedure.     Patient does not have an allergy to contrast dye.    Patient has someone available to drive them home from the hospital and can stay with them for 24 hours after the procedure.     This is a same day procedure. Patient should pack an overnight bag just in case he may need to stay overnight.    Patient had no questions about their prep instructions.   CLIFFORD Miranda  [facility]

## 2017-11-17 NOTE — PROGRESS NOTES
HISTORY OF PRESENT ILLNESS:  Mr. Fischer is a pleasant 72-year-old gentleman with a history of atrial flutter status post flutter ablation with a prior history of a tachycardia-induced cardiomyopathy with an eventual improvement in his ejection fraction, RV dysfunction of unknown etiology for which he has been evaluated at the [facility] along with significant tricuspid regurgitation related to RV annular dilatation along with chronic kidney disease.  I am seeing him in close clinical followup.  In the past, he has also developed recurrent atrial fibrillation and has been placed on amiodarone to maintain sinus rhythm to prevent recurrent tachycardia-induced cardiomyopathy.  Finally, he does have a mildly abnormal nuclear stress test which will be treated medically due to his significant chronic kidney disease.      Unfortunately, the patient underwent a transthoracic echocardiogram prior to this visit.  This shows that his left ventricular ejection fraction has diminished from the 50%-55% range on his prior transthoracic echocardiogram down to 30%-35%.  His RV dysfunction also appears more significant as does his tricuspid regurgitation.  Interestingly, the patient is actually completely asymptomatic.  He states he is feeling the best he has felt in some time.  He has been taking his Demadex 5 mg a day as needed.  Typically, he can go weeks without having to take his Demadex with no significant volume overload.  He also remains on his amiodarone.  We did perform a 12-lead ECG in the office which shows that he is in sinus rhythm.  Finally, he does take his apixaban because of his history of recurrent paroxysmal atrial fibrillation.  He has had no bleeding difficulties on his apixaban.      Please see my separate note for his full physical examination.       IMPRESSION AND PLAN:  Mr. Fischer is a pleasant but complicated 72-year-old gentleman with likely underlying coronary artery disease diagnosed on a prior nuclear  stress test which we have been treating medically, tachycardia-induced cardiomyopathy, RV dysfunction with secondary tricuspid regurgitation for which he has previously been evaluated at the [facility].  We have previously discussed a tricuspid valve repair but the patient has deferred.  At this time, his EF has dropped despite being in sinus rhythm.  This is concerning for possible worsening ischemic disease and at this time, I have asked the patient undergo a right and left heart catheterization.  Informed consent was obtained after explaining the risks and benefits of the right and left heart catheterization with the patient along with other alternative options.  The patient was in agreement.  In the interim, I will start a low dose beta blocker now that his ejection fraction has dropped.  I will continue his torsemide p.r.n., amiodarone and apixaban unchanged.  Given his chronic kidney disease, I will hold off on an ARB at this time.  I will plan to see the patient back in close clinical followup after the right and left heart catheterizations have been completed.         TYRONE NEWBY MD             D: 2017 14:06   T: 2017 14:49   MT: JESSY      Name:     TREY BURGER   MRN:      [id number]        Account:      [id number]   :      1945           Service Date: 2017      Document: [id number]

## 2017-11-21 ENCOUNTER — APPOINTMENT (OUTPATIENT)
Dept: CARDIOLOGY | Facility: CLINIC | Age: 72
End: 2017-11-21
Attending: INTERNAL MEDICINE
Payer: MEDICARE

## 2017-11-21 ENCOUNTER — HOSPITAL ENCOUNTER (OUTPATIENT)
Facility: CLINIC | Age: 72
Discharge: HOME | End: 2017-11-21
Attending: INTERNAL MEDICINE | Admitting: INTERNAL MEDICINE
Payer: MEDICARE

## 2017-11-21 VITALS
HEART RATE: 50 BPM | RESPIRATION RATE: 16 BRPM | SYSTOLIC BLOOD PRESSURE: 107 MMHG | OXYGEN SATURATION: 94 % | WEIGHT: 154.1 LBS | TEMPERATURE: 97.5 F | DIASTOLIC BLOOD PRESSURE: 78 MMHG | BODY MASS INDEX: 23.36 KG/M2 | HEIGHT: 68 IN

## 2017-11-21 DIAGNOSIS — Z98.890 STATUS POST CORONARY ANGIOGRAM: Primary | ICD-10-CM

## 2017-11-21 DIAGNOSIS — I42.0 DILATED CARDIOMYOPATHY (H): ICD-10-CM

## 2017-11-21 LAB
CO2 BLD-SCNC: 19 MMOL/L (ref 21–28)
CO2 BLD-SCNC: 22 MMOL/L (ref 21–28)
CO2 BLDCOV-SCNC: 19 MMOL/L (ref 21–28)
CO2 BLDCOV-SCNC: 19 MMOL/L (ref 21–28)
CO2 BLDCOV-SCNC: 22 MMOL/L (ref 21–28)
CO2 BLDCOV-SCNC: 22 MMOL/L (ref 21–28)
CO2 BLDCOV-SCNC: 23 MMOL/L (ref 21–28)
CO2 BLDCOV-SCNC: 24 MMOL/L (ref 21–28)
PCO2 BLD: 30 MM HG (ref 35–45)
PCO2 BLD: 33 MM HG (ref 35–45)
PCO2 BLDV: 28 MM HG (ref 40–50)
PCO2 BLDV: 29 MM HG (ref 40–50)
PCO2 BLDV: 40 MM HG (ref 40–50)
PCO2 BLDV: 42 MM HG (ref 40–50)
PCO2 BLDV: 43 MM HG (ref 40–50)
PCO2 BLDV: 45 MM HG (ref 40–50)
PH BLD: 7.4 PH (ref 7.35–7.45)
PH BLD: 7.44 PH (ref 7.35–7.45)
PH BLDV: 7.33 PH (ref 7.32–7.43)
PH BLDV: 7.34 PH (ref 7.32–7.43)
PH BLDV: 7.34 PH (ref 7.32–7.43)
PH BLDV: 7.35 PH (ref 7.32–7.43)
PH BLDV: 7.43 PH (ref 7.32–7.43)
PH BLDV: 7.45 PH (ref 7.32–7.43)
PO2 BLD: 65 MM HG (ref 80–105)
PO2 BLD: 85 MM HG (ref 80–105)
PO2 BLDV: 159 MM HG (ref 25–47)
PO2 BLDV: 171 MM HG (ref 25–47)
PO2 BLDV: 29 MM HG (ref 25–47)
PO2 BLDV: 31 MM HG (ref 25–47)
SAO2 % BLDA FROM PO2: 93 % (ref 92–100)
SAO2 % BLDA FROM PO2: 97 % (ref 92–100)
SAO2 % BLDV FROM PO2: 100 %
SAO2 % BLDV FROM PO2: 100 %
SAO2 % BLDV FROM PO2: 51 %
SAO2 % BLDV FROM PO2: 51 %
SAO2 % BLDV FROM PO2: 52 %
SAO2 % BLDV FROM PO2: 56 %

## 2017-11-21 PROCEDURE — 27210787 ZZH MANIFOLD CR2

## 2017-11-21 PROCEDURE — B2111ZZ FLUOROSCOPY OF MULTIPLE CORONARY ARTERIES USING LOW OSMOLAR CONTRAST: ICD-10-PCS | Performed by: INTERNAL MEDICINE

## 2017-11-21 PROCEDURE — 93460 R&L HRT ART/VENTRICLE ANGIO: CPT

## 2017-11-21 PROCEDURE — 27210795 ZZH PAD DEFIB QUICK CR4

## 2017-11-21 PROCEDURE — 27211181 ZZH BALLOON TIP PRESSURE CR5

## 2017-11-21 PROCEDURE — 25000125 ZZHC RX 250: Performed by: INTERNAL MEDICINE

## 2017-11-21 PROCEDURE — 27210946 ZZH KIT HC TOTES DISP CR8

## 2017-11-21 PROCEDURE — 27211089 ZZH KIT ACIST INJECTOR CR3

## 2017-11-21 PROCEDURE — 25000128 H RX IP 250 OP 636: Performed by: INTERNAL MEDICINE

## 2017-11-21 PROCEDURE — 40000065 ZZH STATISTIC EKG NON-CHARGEABLE

## 2017-11-21 PROCEDURE — 82803 BLOOD GASES ANY COMBINATION: CPT

## 2017-11-21 PROCEDURE — 93005 ELECTROCARDIOGRAM TRACING: CPT

## 2017-11-21 PROCEDURE — 99152 MOD SED SAME PHYS/QHP 5/>YRS: CPT

## 2017-11-21 PROCEDURE — 93460 R&L HRT ART/VENTRICLE ANGIO: CPT | Mod: 26 | Performed by: INTERNAL MEDICINE

## 2017-11-21 PROCEDURE — 99153 MOD SED SAME PHYS/QHP EA: CPT

## 2017-11-21 PROCEDURE — 40000852 ZZH STATISTIC HEART CATH LAB OR EP LAB

## 2017-11-21 PROCEDURE — 27210742 ZZH CATH CR1

## 2017-11-21 PROCEDURE — 40000235 ZZH STATISTIC TELEMETRY

## 2017-11-21 PROCEDURE — 93010 ELECTROCARDIOGRAM REPORT: CPT | Performed by: INTERNAL MEDICINE

## 2017-11-21 PROCEDURE — C1769 GUIDE WIRE: HCPCS

## 2017-11-21 PROCEDURE — 99152 MOD SED SAME PHYS/QHP 5/>YRS: CPT | Performed by: INTERNAL MEDICINE

## 2017-11-21 PROCEDURE — B2151ZZ FLUOROSCOPY OF LEFT HEART USING LOW OSMOLAR CONTRAST: ICD-10-PCS | Performed by: INTERNAL MEDICINE

## 2017-11-21 PROCEDURE — 4A023N8 MEASUREMENT OF CARDIAC SAMPLING AND PRESSURE, BILATERAL, PERCUTANEOUS APPROACH: ICD-10-PCS | Performed by: INTERNAL MEDICINE

## 2017-11-21 RX ORDER — LIDOCAINE 40 MG/G
CREAM TOPICAL
Status: DISCONTINUED | OUTPATIENT
Start: 2017-11-21 | End: 2017-11-21 | Stop reason: HOSPADM

## 2017-11-21 RX ORDER — ACETAMINOPHEN 325 MG/1
325-650 TABLET ORAL EVERY 4 HOURS PRN
Status: DISCONTINUED | OUTPATIENT
Start: 2017-11-21 | End: 2017-11-21 | Stop reason: HOSPADM

## 2017-11-21 RX ORDER — ENALAPRILAT 1.25 MG/ML
1.25-2.5 INJECTION INTRAVENOUS
Status: DISCONTINUED | OUTPATIENT
Start: 2017-11-21 | End: 2017-11-21 | Stop reason: HOSPADM

## 2017-11-21 RX ORDER — NITROGLYCERIN 5 MG/ML
100-500 VIAL (ML) INTRAVENOUS
Status: DISCONTINUED | OUTPATIENT
Start: 2017-11-21 | End: 2017-11-21 | Stop reason: HOSPADM

## 2017-11-21 RX ORDER — DOPAMINE HYDROCHLORIDE 160 MG/100ML
2-20 INJECTION, SOLUTION INTRAVENOUS CONTINUOUS PRN
Status: DISCONTINUED | OUTPATIENT
Start: 2017-11-21 | End: 2017-11-21 | Stop reason: HOSPADM

## 2017-11-21 RX ORDER — DEXTROSE MONOHYDRATE 25 G/50ML
12.5-5 INJECTION, SOLUTION INTRAVENOUS EVERY 30 MIN PRN
Status: DISCONTINUED | OUTPATIENT
Start: 2017-11-21 | End: 2017-11-21 | Stop reason: HOSPADM

## 2017-11-21 RX ORDER — POTASSIUM CHLORIDE 1500 MG/1
20 TABLET, EXTENDED RELEASE ORAL
Status: DISCONTINUED | OUTPATIENT
Start: 2017-11-21 | End: 2017-11-21 | Stop reason: HOSPADM

## 2017-11-21 RX ORDER — METOPROLOL TARTRATE 1 MG/ML
5 INJECTION, SOLUTION INTRAVENOUS EVERY 5 MIN PRN
Status: DISCONTINUED | OUTPATIENT
Start: 2017-11-21 | End: 2017-11-21 | Stop reason: HOSPADM

## 2017-11-21 RX ORDER — NITROGLYCERIN 20 MG/100ML
.07-2 INJECTION INTRAVENOUS CONTINUOUS PRN
Status: DISCONTINUED | OUTPATIENT
Start: 2017-11-21 | End: 2017-11-21 | Stop reason: HOSPADM

## 2017-11-21 RX ORDER — VERAPAMIL HYDROCHLORIDE 2.5 MG/ML
1-2.5 INJECTION, SOLUTION INTRAVENOUS
Status: DISCONTINUED | OUTPATIENT
Start: 2017-11-21 | End: 2017-11-21 | Stop reason: HOSPADM

## 2017-11-21 RX ORDER — NIFEDIPINE 10 MG/1
10 CAPSULE ORAL
Status: DISCONTINUED | OUTPATIENT
Start: 2017-11-21 | End: 2017-11-21 | Stop reason: HOSPADM

## 2017-11-21 RX ORDER — FLUMAZENIL 0.1 MG/ML
0.2 INJECTION, SOLUTION INTRAVENOUS
Status: DISCONTINUED | OUTPATIENT
Start: 2017-11-21 | End: 2017-11-21 | Stop reason: HOSPADM

## 2017-11-21 RX ORDER — NALOXONE HYDROCHLORIDE 0.4 MG/ML
.2-.4 INJECTION, SOLUTION INTRAMUSCULAR; INTRAVENOUS; SUBCUTANEOUS
Status: DISCONTINUED | OUTPATIENT
Start: 2017-11-21 | End: 2017-11-21 | Stop reason: HOSPADM

## 2017-11-21 RX ORDER — NALOXONE HYDROCHLORIDE 0.4 MG/ML
0.4 INJECTION, SOLUTION INTRAMUSCULAR; INTRAVENOUS; SUBCUTANEOUS EVERY 5 MIN PRN
Status: DISCONTINUED | OUTPATIENT
Start: 2017-11-21 | End: 2017-11-21 | Stop reason: HOSPADM

## 2017-11-21 RX ORDER — FENTANYL CITRATE 50 UG/ML
25-50 INJECTION, SOLUTION INTRAMUSCULAR; INTRAVENOUS
Status: DISCONTINUED | OUTPATIENT
Start: 2017-11-21 | End: 2017-11-21 | Stop reason: HOSPADM

## 2017-11-21 RX ORDER — FUROSEMIDE 10 MG/ML
20-100 INJECTION INTRAMUSCULAR; INTRAVENOUS
Status: DISCONTINUED | OUTPATIENT
Start: 2017-11-21 | End: 2017-11-21 | Stop reason: HOSPADM

## 2017-11-21 RX ORDER — ATROPINE SULFATE 0.1 MG/ML
0.5 INJECTION INTRAVENOUS EVERY 5 MIN PRN
Status: DISCONTINUED | OUTPATIENT
Start: 2017-11-21 | End: 2017-11-21 | Stop reason: HOSPADM

## 2017-11-21 RX ORDER — NITROGLYCERIN 5 MG/ML
100-200 VIAL (ML) INTRAVENOUS
Status: DISCONTINUED | OUTPATIENT
Start: 2017-11-21 | End: 2017-11-21 | Stop reason: HOSPADM

## 2017-11-21 RX ORDER — HYDRALAZINE HYDROCHLORIDE 20 MG/ML
10-20 INJECTION INTRAMUSCULAR; INTRAVENOUS
Status: DISCONTINUED | OUTPATIENT
Start: 2017-11-21 | End: 2017-11-21 | Stop reason: HOSPADM

## 2017-11-21 RX ORDER — LIDOCAINE HYDROCHLORIDE 10 MG/ML
30 INJECTION, SOLUTION EPIDURAL; INFILTRATION; INTRACAUDAL; PERINEURAL
Status: DISCONTINUED | OUTPATIENT
Start: 2017-11-21 | End: 2017-11-21 | Stop reason: HOSPADM

## 2017-11-21 RX ORDER — POTASSIUM CHLORIDE 7.45 MG/ML
10 INJECTION INTRAVENOUS
Status: DISCONTINUED | OUTPATIENT
Start: 2017-11-21 | End: 2017-11-21 | Stop reason: HOSPADM

## 2017-11-21 RX ORDER — ATROPINE SULFATE 0.1 MG/ML
.5-1 INJECTION INTRAVENOUS
Status: DISCONTINUED | OUTPATIENT
Start: 2017-11-21 | End: 2017-11-21 | Stop reason: HOSPADM

## 2017-11-21 RX ORDER — PHENYLEPHRINE HCL IN 0.9% NACL 1 MG/10 ML
20-100 SYRINGE (ML) INTRAVENOUS
Status: DISCONTINUED | OUTPATIENT
Start: 2017-11-21 | End: 2017-11-21 | Stop reason: HOSPADM

## 2017-11-21 RX ORDER — CLOPIDOGREL 300 MG/1
300-600 TABLET, FILM COATED ORAL
Status: DISCONTINUED | OUTPATIENT
Start: 2017-11-21 | End: 2017-11-21 | Stop reason: HOSPADM

## 2017-11-21 RX ORDER — MORPHINE SULFATE 2 MG/ML
1-2 INJECTION, SOLUTION INTRAMUSCULAR; INTRAVENOUS EVERY 5 MIN PRN
Status: DISCONTINUED | OUTPATIENT
Start: 2017-11-21 | End: 2017-11-21 | Stop reason: HOSPADM

## 2017-11-21 RX ORDER — METHYLPREDNISOLONE SODIUM SUCCINATE 125 MG/2ML
125 INJECTION, POWDER, LYOPHILIZED, FOR SOLUTION INTRAMUSCULAR; INTRAVENOUS
Status: DISCONTINUED | OUTPATIENT
Start: 2017-11-21 | End: 2017-11-21 | Stop reason: HOSPADM

## 2017-11-21 RX ORDER — EPINEPHRINE 1 MG/ML
0.3 INJECTION, SOLUTION, CONCENTRATE INTRAVENOUS
Status: DISCONTINUED | OUTPATIENT
Start: 2017-11-21 | End: 2017-11-21 | Stop reason: HOSPADM

## 2017-11-21 RX ORDER — NICARDIPINE HYDROCHLORIDE 2.5 MG/ML
100 INJECTION INTRAVENOUS
Status: DISCONTINUED | OUTPATIENT
Start: 2017-11-21 | End: 2017-11-21 | Stop reason: HOSPADM

## 2017-11-21 RX ORDER — NITROGLYCERIN 0.4 MG/1
0.4 TABLET SUBLINGUAL EVERY 5 MIN PRN
Status: DISCONTINUED | OUTPATIENT
Start: 2017-11-21 | End: 2017-11-21 | Stop reason: HOSPADM

## 2017-11-21 RX ORDER — PROMETHAZINE HYDROCHLORIDE 25 MG/ML
6.25-25 INJECTION, SOLUTION INTRAMUSCULAR; INTRAVENOUS EVERY 4 HOURS PRN
Status: DISCONTINUED | OUTPATIENT
Start: 2017-11-21 | End: 2017-11-21 | Stop reason: HOSPADM

## 2017-11-21 RX ORDER — SODIUM NITROPRUSSIDE 25 MG/ML
100-200 INJECTION INTRAVENOUS
Status: DISCONTINUED | OUTPATIENT
Start: 2017-11-21 | End: 2017-11-21 | Stop reason: HOSPADM

## 2017-11-21 RX ORDER — ONDANSETRON 2 MG/ML
4 INJECTION INTRAMUSCULAR; INTRAVENOUS EVERY 4 HOURS PRN
Status: DISCONTINUED | OUTPATIENT
Start: 2017-11-21 | End: 2017-11-21 | Stop reason: HOSPADM

## 2017-11-21 RX ORDER — DIPHENHYDRAMINE HYDROCHLORIDE 50 MG/ML
25-50 INJECTION INTRAMUSCULAR; INTRAVENOUS
Status: DISCONTINUED | OUTPATIENT
Start: 2017-11-21 | End: 2017-11-21 | Stop reason: HOSPADM

## 2017-11-21 RX ORDER — NALOXONE HYDROCHLORIDE 0.4 MG/ML
.1-.4 INJECTION, SOLUTION INTRAMUSCULAR; INTRAVENOUS; SUBCUTANEOUS
Status: DISCONTINUED | OUTPATIENT
Start: 2017-11-21 | End: 2017-11-21 | Stop reason: HOSPADM

## 2017-11-21 RX ORDER — ASPIRIN 325 MG
325 TABLET ORAL
Status: DISCONTINUED | OUTPATIENT
Start: 2017-11-21 | End: 2017-11-21 | Stop reason: HOSPADM

## 2017-11-21 RX ORDER — PROTAMINE SULFATE 10 MG/ML
1-5 INJECTION, SOLUTION INTRAVENOUS
Status: DISCONTINUED | OUTPATIENT
Start: 2017-11-21 | End: 2017-11-21 | Stop reason: HOSPADM

## 2017-11-21 RX ORDER — ASPIRIN 81 MG/1
81-324 TABLET, CHEWABLE ORAL
Status: DISCONTINUED | OUTPATIENT
Start: 2017-11-21 | End: 2017-11-21 | Stop reason: HOSPADM

## 2017-11-21 RX ORDER — SODIUM CHLORIDE 9 MG/ML
INJECTION, SOLUTION INTRAVENOUS CONTINUOUS
Status: DISCONTINUED | OUTPATIENT
Start: 2017-11-21 | End: 2017-11-21 | Stop reason: HOSPADM

## 2017-11-21 RX ORDER — HYDROCODONE BITARTRATE AND ACETAMINOPHEN 5; 325 MG/1; MG/1
1-2 TABLET ORAL EVERY 4 HOURS PRN
Status: DISCONTINUED | OUTPATIENT
Start: 2017-11-21 | End: 2017-11-21 | Stop reason: HOSPADM

## 2017-11-21 RX ORDER — ADENOSINE 3 MG/ML
12-12000 INJECTION, SOLUTION INTRAVENOUS
Status: DISCONTINUED | OUTPATIENT
Start: 2017-11-21 | End: 2017-11-21 | Stop reason: HOSPADM

## 2017-11-21 RX ORDER — IOPAMIDOL 755 MG/ML
50 INJECTION, SOLUTION INTRAVASCULAR ONCE
Status: COMPLETED | OUTPATIENT
Start: 2017-11-21 | End: 2017-11-21

## 2017-11-21 RX ORDER — PRASUGREL 10 MG/1
10-60 TABLET, FILM COATED ORAL
Status: DISCONTINUED | OUTPATIENT
Start: 2017-11-21 | End: 2017-11-21 | Stop reason: HOSPADM

## 2017-11-21 RX ORDER — DOBUTAMINE HYDROCHLORIDE 200 MG/100ML
2-20 INJECTION INTRAVENOUS CONTINUOUS PRN
Status: DISCONTINUED | OUTPATIENT
Start: 2017-11-21 | End: 2017-11-21 | Stop reason: HOSPADM

## 2017-11-21 RX ORDER — LIDOCAINE HYDROCHLORIDE 10 MG/ML
1-10 INJECTION, SOLUTION EPIDURAL; INFILTRATION; INTRACAUDAL; PERINEURAL
Status: COMPLETED | OUTPATIENT
Start: 2017-11-21 | End: 2017-11-21

## 2017-11-21 RX ORDER — HEPARIN SODIUM 1000 [USP'U]/ML
1000-10000 INJECTION, SOLUTION INTRAVENOUS; SUBCUTANEOUS EVERY 5 MIN PRN
Status: DISCONTINUED | OUTPATIENT
Start: 2017-11-21 | End: 2017-11-21 | Stop reason: HOSPADM

## 2017-11-21 RX ORDER — PROTAMINE SULFATE 10 MG/ML
25-100 INJECTION, SOLUTION INTRAVENOUS EVERY 5 MIN PRN
Status: DISCONTINUED | OUTPATIENT
Start: 2017-11-21 | End: 2017-11-21 | Stop reason: HOSPADM

## 2017-11-21 RX ORDER — BUPIVACAINE HYDROCHLORIDE 2.5 MG/ML
1-10 INJECTION, SOLUTION EPIDURAL; INFILTRATION; INTRACAUDAL
Status: DISCONTINUED | OUTPATIENT
Start: 2017-11-21 | End: 2017-11-21 | Stop reason: HOSPADM

## 2017-11-21 RX ORDER — CLOPIDOGREL BISULFATE 75 MG/1
75 TABLET ORAL
Status: DISCONTINUED | OUTPATIENT
Start: 2017-11-21 | End: 2017-11-21 | Stop reason: HOSPADM

## 2017-11-21 RX ORDER — LORAZEPAM 2 MG/ML
.5-2 INJECTION INTRAMUSCULAR EVERY 4 HOURS PRN
Status: DISCONTINUED | OUTPATIENT
Start: 2017-11-21 | End: 2017-11-21 | Stop reason: HOSPADM

## 2017-11-21 RX ORDER — POTASSIUM CHLORIDE 29.8 MG/ML
20 INJECTION INTRAVENOUS
Status: DISCONTINUED | OUTPATIENT
Start: 2017-11-21 | End: 2017-11-21 | Stop reason: HOSPADM

## 2017-11-21 RX ADMIN — MIDAZOLAM HYDROCHLORIDE 1 MG: 1 INJECTION, SOLUTION INTRAMUSCULAR; INTRAVENOUS at 10:33

## 2017-11-21 RX ADMIN — LIDOCAINE HYDROCHLORIDE 100 MG: 10 INJECTION, SOLUTION EPIDURAL; INFILTRATION; INTRACAUDAL; PERINEURAL at 10:44

## 2017-11-21 RX ADMIN — IOPAMIDOL 50 ML: 755 INJECTION, SOLUTION INTRAVASCULAR at 12:00

## 2017-11-21 RX ADMIN — MIDAZOLAM HYDROCHLORIDE 1 MG: 1 INJECTION, SOLUTION INTRAMUSCULAR; INTRAVENOUS at 10:41

## 2017-11-21 RX ADMIN — SODIUM CHLORIDE 150 ML: 9 INJECTION, SOLUTION INTRAVENOUS at 07:39

## 2017-11-21 RX ADMIN — FENTANYL CITRATE 50 MCG: 50 INJECTION, SOLUTION INTRAMUSCULAR; INTRAVENOUS at 10:30

## 2017-11-21 NOTE — DISCHARGE INSTRUCTIONS
Cardiac Angiogram Discharge Instructions - Femoral    After you go home:      Have an adult stay with you until tomorrow.    Drink extra fluids for 2 days.    You may resume your normal diet.    No smoking       For 24 hours - due to the sedation you received:    Relax and take it easy.    Do NOT make any important or legal decisions.    Do NOT drive or operate machines at home or at work.    Do NOT drink alcohol.    Care of Groin Puncture Site:      For the first 24 hrs - check the puncture site every 1-2 hours while awake.    For 2 days, when you cough, sneeze, laugh or move your bowels, hold your hand over the puncture site and press firmly.    Remove the bandaid after 24 hours. If there is minor oozing, apply another bandaid and remove it after 12 hours.    It is normal to have a small bruise or pea size lump at the site.    You may shower tomorrow. Do NOT take a bath, or use a hot tub or pool for at least 3 days. Do NOT scrub the site. Do not use lotion or powder near the puncture site.    Activity:            For 2 days:    No stooping or squatting    Do NOT do any heavy activity such as exercise, lifting, or straining.     No housework, yard work or any activity that make you sweat    Do NOT lift more than 10 pounds    Bleeding:      If you start bleeding from the site in your groin, lie down flat and press firmly on/above the site for 10 minutes.     Once bleeding stops, lay flat for 2 hours.     Call [facility] as soon as you can.       Call 911 right away if you have heavy bleeding or bleeding that does not stop.      Medicines:      If you are taking antiplatelet medications such as Plavix, Brilinta or Effient, do not stop taking it until you talk to your cardiologist.      If you are on Metformin (Glucophage), do not restart it until you have blood tests (within 2 to 3 days after discharge).  After you have your blood drawn, you may restart the Metformin.     Take your medications, including blood  thinners, unless your provider tells you not to.  If you take Coumadin (Warfarin), have your INR checked by your provider in  3-5 days. Call your clinic to schedule this.    If you have stopped any medicines, check with your provider about when to restart them.    Follow Up Appointments:      Follow up with [facility] Nurse Practitioner at [facility] of patient preference in 7-10 days.    Call the clinic if:      You have increased pain or a large or growing hard lump around the site.    The site is red, swollen, hot or tender.    Blood or fluid is draining from the site.    You have chills or a fever greater than 101 F (38 C).    Your leg turns feels numb, cool or changes color.    You have hives, a rash or unusual itching.    New pain in the back or belly that you cannot control with Tylenol.    Any questions or concerns.          [facility] at [place]:    [phone] [facility] (7 days a week)

## 2017-11-21 NOTE — PROGRESS NOTES
Patient up out of bed 1 hour after sm hematoma presented- site remains CDI, soft without hematoma after patient up ambulating in halls and voided in BR.  Denies pain, tole PO, VS WNL, states has no further questions regarding DC home instructions.  DC home with .

## 2017-11-21 NOTE — IP AVS SNAPSHOT
[facility] [address]    [place] [address]    Phone:  [phone]                                       After Visit Summary   11/21/2017    Misael Fischer    MRN: [id number]           After Visit Summary Signature Page     I have received my discharge instructions, and my questions have been answered. I have discussed any challenges I see with this plan with the nurse or doctor.    ..........................................................................................................................................  Patient/Patient Representative Signature      ..........................................................................................................................................  Patient Representative Print Name and Relationship to Patient    ..................................................               ................................................  Date                                            Time    ..........................................................................................................................................  Reviewed by Signature/Title    ...................................................              ..............................................  Date                                                            Time

## 2017-11-21 NOTE — IP AVS SNAPSHOT
MRN:[id number]                      After Visit Summary   11/21/2017    Misael Fischer    MRN: [id number]           Visit Information        Department      11/21/2017  6:12 AM [facility]          Review of your medicines      CONTINUE these medicines which have NOT CHANGED        Dose / Directions    amiodarone 200 MG tablet   Commonly known as:  PACERONE/CODARONE   Used for:  Paroxysmal atrial fibrillation (H)        Dose:  200 mg   Take 1 tablet (200 mg) by mouth daily   Quantity:  30 tablet   Refills:  3       apixaban ANTICOAGULANT 5 MG tablet   Commonly known as:  ELIQUIS   Used for:  Persistent atrial fibrillation (H)        Dose:  5 mg   Take 1 tablet (5 mg) by mouth 2 times daily   Quantity:  180 tablet   Refills:  3       metoprolol 25 MG 24 hr tablet   Commonly known as:  TOPROL-XL   Used for:  Dilated cardiomyopathy (H)        Dose:  12.5 mg   Take 0.5 tablets (12.5 mg) by mouth daily   Quantity:  30 tablet   Refills:  11       torsemide 5 MG tablet   Commonly known as:  DEMADEX   Used for:  Non-rheumatic tricuspid valve insufficiency        Dose:  5 mg   Take 1 tablet (5 mg) by mouth daily Down to 5mg daily   Quantity:  90 tablet   Refills:  1       VITAMIN D (CHOLECALCIFEROL) PO        Dose:  2000 Units   Take 2,000 Units by mouth daily   Refills:  0                Protect others around you: Learn how to safely use, store and throw away your medicines at www.disposemymeds.org.         Follow-ups after your visit        Your next 10 appointments already scheduled     Nov 29, 2017 11:30 AM CST   [facility]    [address] [place] [address] [phone]            Dec 04, 2017  8:15 AM CST   Return Visit with Prabhakar Castro MD   [facility] ([facility])    [address] [address] [phone]                Care Instructions        After Care Instructions     Discharge Instructions - Follow up with [facility] Cardiologist       Follow -up with the [facility] Clinic of patient preference in 1-3 weeks--if you can get appt with dr garrido in a couple weeks then won't need np visit first            Discharge Instructions - Follow up with [facility] Nurse Practitioner        Follow up with [facility] Nurse Practitioner at [facility] Clinic of patient preference in 7-10 days.            Discharge Instructions - IF on Metformin (Glucophage or Glucovance) or Metformin containing medications       IF on Metformin (Glucophage or Glucovance) or Metformin containing medications , schedule a Basic Metabolic Panel at [facility] or Primary Clinic in 48 - 72 hours post procedure and PRIOR TO resuming the Metformin or Metformin containing medications.  Hold Metformin (Glucophage or Glucovance) or Metformin containing medications until after the Basic Metabolic Panel on the 2nd or 3rd day following the procedure.  May resume after blood draw is complete.            Patient care order       Do not restart eliquis until Wednesday after 6pm provided no bleed from groin site                  Further instructions from your care team       Cardiac Angiogram Discharge Instructions - Femoral    After you go home:      Have an adult stay with you until tomorrow.    Drink extra fluids for 2 days.    You may resume your normal diet.    No smoking       For 24 hours - due to the sedation you received:    Relax and take it easy.    Do NOT make any important or legal decisions.    Do NOT drive or operate machines at home or at work.    Do NOT drink alcohol.    Care of Groin Puncture Site:      For the first 24 hrs - check the puncture site every 1-2 hours while awake.    For 2 days, when you cough, sneeze, laugh or move your bowels, hold your hand over the puncture site and press firmly.    Remove the bandaid after 24 hours. If there is minor oozing,  apply another bandaid and remove it after 12 hours.    It is normal to have a small bruise or pea size lump at the site.    You may shower tomorrow. Do NOT take a bath, or use a hot tub or pool for at least 3 days. Do NOT scrub the site. Do not use lotion or powder near the puncture site.    Activity:            For 2 days:    No stooping or squatting    Do NOT do any heavy activity such as exercise, lifting, or straining.     No housework, yard work or any activity that make you sweat    Do NOT lift more than 10 pounds    Bleeding:      If you start bleeding from the site in your groin, lie down flat and press firmly on/above the site for 10 minutes.     Once bleeding stops, lay flat for 2 hours.     Call [facility] as soon as you can.       Call 911 right away if you have heavy bleeding or bleeding that does not stop.      Medicines:      If you are taking antiplatelet medications such as Plavix, Brilinta or Effient, do not stop taking it until you talk to your cardiologist.      If you are on Metformin (Glucophage), do not restart it until you have blood tests (within 2 to 3 days after discharge).  After you have your blood drawn, you may restart the Metformin.     Take your medications, including blood thinners, unless your provider tells you not to.  If you take Coumadin (Warfarin), have your INR checked by your provider in  3-5 days. Call your clinic to schedule this.    If you have stopped any medicines, check with your provider about when to restart them.    Follow Up Appointments:      Follow up with [facility] Nurse Practitioner at [facility] of patient preference in 7-10 days.    Call the clinic if:      You have increased pain or a large or growing hard lump around the site.    The site is red, swollen, hot or tender.    Blood or fluid is draining from the site.    You have chills or a fever greater than 101 F (38 C).    Your leg turns feels numb, cool or changes color.    You have hives, a rash or  "unusual itching.    New pain in the back or belly that you cannot control with Tylenol.    Any questions or concerns.          [facility] [place]:    [phone] UMP (7 days a week)           Additional Information About Your Visit        MyChart Information     [employer] gives you secure access to your electronic health record. If you see a primary care provider, you can also send messages to your care team and make appointments. If you have questions, please call your primary care clinic.  If you do not have a primary care provider, please call [phone] and they will assist you.        Care EveryWhere ID     This is your Care EveryWhere ID. This could be used by other organizations to access your [place] medical records  [id number]        Your Vitals Were     Blood Pressure Pulse Temperature Respirations Height Weight    110/70 50 97.5  F (36.4  C) (Oral) 16 1.727 m (5' 8\") 69.9 kg (154 lb 1.6 oz)    Pulse Oximetry BMI (Body Mass Index)                95% 23.43 kg/m2           Primary Care Provider Office Phone # Fax #    Kevan Bull [phone]      Equal Access to Services     EDUARDO DELANEY : Hadii aad ku hadasho Soomaali, waaxda luqadaha, qaybta kaalmada adeegyada, lakhwinder solomon. So [facility] [phone].    ATENCIÓN: Si habla español, tiene a stewart disposición servicios gratuitos de asistencia lingüística. Noame al [phone].    We comply with applicable federal civil rights laws and [place] laws. We do not discriminate on the basis of race, color, national origin, age, disability, sex, sexual orientation, or gender identity.            Thank you!     Thank you for choosing [place] for your care. Our goal is always to provide you with excellent care. Hearing back from our patients is one way we can continue to improve our services. Please take a few minutes to complete the written survey that you may receive in the mail " after you visit with us. Thank you!             Medication List: This is a list of all your medications and when to take them. Check marks below indicate your daily home schedule. Keep this list as a reference.      Medications           Morning Afternoon Evening Bedtime As Needed    amiodarone 200 MG tablet   Commonly known as:  PACERONE/CODARONE   Take 1 tablet (200 mg) by mouth daily                                apixaban ANTICOAGULANT 5 MG tablet   Commonly known as:  ELIQUIS   Take 1 tablet (5 mg) by mouth 2 times daily                                metoprolol 25 MG 24 hr tablet   Commonly known as:  TOPROL-XL   Take 0.5 tablets (12.5 mg) by mouth daily                                torsemide 5 MG tablet   Commonly known as:  DEMADEX   Take 1 tablet (5 mg) by mouth daily Down to 5mg daily                                VITAMIN D (CHOLECALCIFEROL) PO   Take 2,000 Units by mouth daily

## 2017-11-21 NOTE — PROGRESS NOTES
Pt ambulated in hallway without diff.  Reports no diff voiding.  Site unchanged after first check upon returning from activity.  On second check after 5 min site firm and small pea size hematoma, slight more bleeding under Tegaderm noted.  No pain.  Pt was given d/c instructions both pt and son state understanding.  Will cont to monitor, call light in reach.

## 2017-11-21 NOTE — PROGRESS NOTES
Pt very frustrated and states that he does not understand why his cardiologist is not going to take care of his thyroid problems TODAY - since the cardiologist stated that the elevated thyroid levels could be effecting his heart. Explained at length to pt - that the cardiologist takes care of his heart and his primary doctor will take care of his thyroid & follow up care. Pt stated that he can't get in to see his primary doctor for 1 week - appt already made - and he is very frustrated about the situation. Explained to pt that his thyroid levels did not elevate overnight and that as this is not an emergent situation - that seeing his primary doctor in a week will be fine. Pt's family at bedside - verbal understanding received from family member. Pt remains very frustrated.

## 2017-11-21 NOTE — PROGRESS NOTES
Pt here early for IV fluids prior to proc.  Fluids started at 0740.  Assessment complete.  Labs drawn yesterday in clinic.  Pt and son watch ed video now.  Contrast instr given to pt pre procedure.  Call light in reach

## 2017-11-23 LAB — INTERPRETATION ECG - MUSE: NORMAL

## 2017-11-27 ENCOUNTER — TELEPHONE (OUTPATIENT)
Dept: CARDIOLOGY | Facility: CLINIC | Age: 72
End: 2017-11-27

## 2017-11-27 DIAGNOSIS — R79.89 ELEVATED TSH: Primary | ICD-10-CM

## 2017-11-27 NOTE — TELEPHONE ENCOUNTER
I would prefer if he can stay on the medication but if he is too dizzy he can stop it and we can discuss it when I see him back.  He should have an appointment with me.

## 2017-11-27 NOTE — TELEPHONE ENCOUNTER
"Called patient to f/u on TSH results from 11/17/17. Per 's recommendations, pt to see Endocrinology.   Referral placed in EPIC. Pt agrees with plan and all questions were answered.     Pt also mentions feeling more lightheaded and dizzy after restarting Metoprolol. Metoprolol 12.5 mg daily restarted at OV w/  11/17/17.   Per last OV note, \"I will start a low dose beta blocker now that his ejection fraction has dropped. I will continue his torsemide p.r.n., amiodarone and apixaban unchanged.   Given his chronic kidney disease, I will hold off on an ARB at this time. I will plan to see the patient back in close clinical followup after the right and left heart catheterizations have been completed\".    Pt has OV scheduled on 12/4/17 with . Will update  on pt's symptoms.  CLIFFORD Miranda    "

## 2017-11-28 NOTE — TELEPHONE ENCOUNTER
Called patient w/ 's recommendations. Pt will remain on Metoprolol and will discuss w/  on 12/4/17.   CLIFFORD Miranda

## 2017-12-04 ENCOUNTER — CARE COORDINATION (OUTPATIENT)
Dept: TRANSPLANT | Facility: CLINIC | Age: 72
End: 2017-12-04

## 2017-12-04 ENCOUNTER — OFFICE VISIT (OUTPATIENT)
Dept: CARDIOLOGY | Facility: CLINIC | Age: 72
End: 2017-12-04
Attending: INTERNAL MEDICINE
Payer: MEDICARE

## 2017-12-04 VITALS
SYSTOLIC BLOOD PRESSURE: 122 MMHG | WEIGHT: 157.3 LBS | HEART RATE: 56 BPM | DIASTOLIC BLOOD PRESSURE: 80 MMHG | BODY MASS INDEX: 23.84 KG/M2 | HEIGHT: 68 IN

## 2017-12-04 DIAGNOSIS — I42.0 DILATED CARDIOMYOPATHY (H): ICD-10-CM

## 2017-12-04 DIAGNOSIS — I36.1 NON-RHEUMATIC TRICUSPID VALVE INSUFFICIENCY: ICD-10-CM

## 2017-12-04 DIAGNOSIS — I42.9 CARDIOMYOPATHY (H): Primary | ICD-10-CM

## 2017-12-04 DIAGNOSIS — I48.92 ATRIAL FLUTTER, UNSPECIFIED TYPE (H): Primary | ICD-10-CM

## 2017-12-04 DIAGNOSIS — I48.0 PAROXYSMAL ATRIAL FIBRILLATION (H): ICD-10-CM

## 2017-12-04 PROCEDURE — 99215 OFFICE O/P EST HI 40 MIN: CPT | Performed by: INTERNAL MEDICINE

## 2017-12-04 RX ORDER — AMIODARONE HYDROCHLORIDE 100 MG/1
100 TABLET ORAL DAILY
Qty: 30 TABLET | Refills: 3 | Status: SHIPPED | OUTPATIENT
Start: 2017-12-04 | End: 2017-12-13

## 2017-12-04 NOTE — LETTER
December 6, 2017    Dear Misael Fischer,    This letter is to confirm your upcoming appointments at the [facility].  You are scheduled as follows:      Date:  December 14th, 2017     10:15am Cardiopulmonary Stress Test.  [facility] Waiting Room located on the 2nd floor (street level) of the [facility] hospital. In preparation for this test, please do not eat or drink for three hours prior, no caffeine for 12 hours prior, and to wear comfortable shoes and clothing.     1:00pm Clinic appointment with Dr. Wetzel,   [facility] (New Location):  [address] [place] [address].  Please arrive 30 minutes early for your visit to get labs done (1st floor of [facility] building).      Please call Judy Adams, Transplant Coordinator, with any questions or concerns  prior to your appointment at [phone]                  Sincerely,    Judy Adams RN, BSN  Heart Transplant Coordinator  [facility]      .

## 2017-12-04 NOTE — MR AVS SNAPSHOT
After Visit Summary   12/4/2017    Misael Fischer    MRN: [id number]           Patient Information     Date Of Birth          1945        Visit Information        Provider Department      12/4/2017 8:15 AM Prabhakar Castro MD [facility]        Today's Diagnoses     Atrial flutter, unspecified type (H)    -  1    Dilated cardiomyopathy (H)        Paroxysmal atrial fibrillation (H)        Non-rheumatic tricuspid valve insufficiency           Follow-ups after your visit        Additional Services     Follow-Up with Advanced Heart Failure Clinic           Follow-Up with Electrophysiologist                 Your next 10 appointments already scheduled     Dec 14, 2017 12:30 PM CST   LAB with  LAB   [facility]    [address] [place] [address]  1st [facility] [id number] [phone]           Please do not eat 10-12 hours before your appointment if you are coming in fasting for labs on lipids, cholesterol, or glucose (sugar). This does not apply to pregnant women. Water, hot tea and black coffee (with nothing added) are okay. Do not drink other fluids, diet soda or chew gum.            Dec 14, 2017  1:00 PM CST   (Arrive by 12:45 PM)   NEW HEART FAILURE with Mitchell Wetzel MD   [facility] ([facility])    [address] [place] [address] [phone]              Future tests that were ordered for you today     Open Future Orders        Priority Expected Expires Ordered    CBC with platelets differential Routine  1/3/2018 12/4/2017    Comprehensive metabolic panel Routine  1/3/2018 12/4/2017    ABO/Rh type and screen Routine  1/3/2018 12/4/2017    Follow-Up with Electrophysiologist Routine 12/11/2017 12/4/2018 12/4/2017    Follow-Up with Advanced Heart Failure Clinic Routine 12/11/2017 12/4/2018 12/4/2017            Who to contact     If you have questions or  "need follow up information about today's clinic visit or your schedule please contact [facility] directly at [phone].  Normal or non-critical lab and imaging results will be communicated to you by MyChart, letter or phone within 4 business days after the clinic has received the results. If you do not hear from us within 7 days, please contact the clinic through [employer] or phone. If you have a critical or abnormal lab result, we will notify you by phone as soon as possible.  Submit refill requests through [employer] or call your pharmacy and they will forward the refill request to us. Please allow 3 business days for your refill to be completed.          Additional Information About Your Visit        [facility] Information     [facility] gives you secure access to your electronic health record. If you see a primary care provider, you can also send messages to your care team and make appointments. If you have questions, please call your primary care clinic.  If you do not have a primary care provider, please call [phone] and they will assist you.        Care EveryWhere ID     This is your Care EveryWhere ID. This could be used by other organizations to access your [place] medical records  [id number]        Your Vitals Were     Pulse Height BMI (Body Mass Index)             56 1.727 m (5' 8\") 23.92 kg/m2          Blood Pressure from Last 3 Encounters:   12/04/17 122/80   11/21/17 107/78   11/17/17 120/76    Weight from Last 3 Encounters:   12/04/17 71.4 kg (157 lb 4.8 oz)   11/21/17 69.9 kg (154 lb 1.6 oz)   11/17/17 68.7 kg (151 lb 6.4 oz)              We Performed the Following     Follow-Up with Cardiologist          Today's Medication Changes          These changes are accurate as of: 12/4/17  4:09 PM.  If you have any questions, ask your nurse or doctor.               These medicines have changed or have updated prescriptions.        Dose/Directions    amiodarone 100 " MG Tabs tablet   Commonly known as:  PACERONE/CODARONE   This may have changed:    - medication strength  - how much to take   Used for:  Paroxysmal atrial fibrillation (H)   Changed by:  Prabhakar Castro MD        Dose:  100 mg   Take 1 tablet (100 mg) by mouth daily   Quantity:  30 tablet   Refills:  3            Where to get your medicines      These medications were sent to [id number] - [place], [place] - [address]  [address], [facility] [address]     Phone:  [phone]     amiodarone 100 MG Tabs tablet                Primary Care Provider Office Phone # Fax #    Kevan Bull [phone] [address]  [facility] [address]        Equal Access to Services     [facility] : Hadii aad ku hadasho Soomaali, waaxda luqadaha, qaybta kaalmada adeegyada, waxay idiin hayaan adejuan luis kharabret reis . So [facility] [phone].    ATENCIÓN: Si habla español, tiene a stewart disposición servicios gratuitos de asistencia lingüística. Llame al [phone].    We comply with applicable federal civil rights laws and [place] laws. We do not discriminate on the basis of race, color, national origin, age, disability, sex, sexual orientation, or gender identity.            Thank you!     Thank you for choosing [facility]  for your care. Our goal is always to provide you with excellent care. Hearing back from our patients is one way we can continue to improve our services. Please take a few minutes to complete the written survey that you may receive in the mail after your visit with us. Thank you!             Your Updated Medication List - Protect others around you: Learn how to safely use, store and throw away your medicines at www.disposemymeds.org.          This list is accurate as of: 12/4/17  4:09 PM.  Always use your most recent med list.                   Brand Name Dispense Instructions for use Diagnosis    amiodarone 100 MG  Tabs tablet    PACERONE/CODARONE    30 tablet    Take 1 tablet (100 mg) by mouth daily    Paroxysmal atrial fibrillation (H)       apixaban ANTICOAGULANT 5 MG tablet    ELIQUIS    180 tablet    Take 1 tablet (5 mg) by mouth 2 times daily    Persistent atrial fibrillation (H)       metoprolol 25 MG 24 hr tablet    TOPROL-XL    30 tablet    Take 0.5 tablets (12.5 mg) by mouth daily    Dilated cardiomyopathy (H)       torsemide 5 MG tablet    DEMADEX    90 tablet    Take 1 tablet (5 mg) by mouth daily Down to 5mg daily    Non-rheumatic tricuspid valve insufficiency       VITAMIN D (CHOLECALCIFEROL) PO      Take 2,000 Units by mouth daily

## 2017-12-04 NOTE — LETTER
12/4/2017      Kevan Bull MD  [address]  [facility] [address]      RE: Misael PATTERSON Amado       Dear Colleague,    I had the pleasure of seeing Misael Fischer in the [facility].    HISTORY OF PRESENT ILLNESS:  Mr. 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  He returns in close clinical followup after undergoing recent right and left heart catheterization.        The patient's right heart catheterization showed a cardiac index of 1.04, PA pressure 28/16, wedge pressure of 20.  His left heart catheterization showed trivial coronary artery disease.  There was evidence on the LV gram for trabeculated ventricle.      In addition since I have seen the patient, his TSH has come back as a part of his amiodarone monitoring and has come back significantly elevated at 32.      Interestingly, the patient has been fairly well compensated from a cardiovascular standpoint.  On exam, he is not volume overloaded.  At our last visit, I placed him on metoprolol succinate 25 mg daily, but he developed lightheadedness.  He has been taking 12.5 mg daily without difficulty.  He continues to report dyspnea with exertion but has been having no shortness of breath at rest.  He denies any chest pain or chest  heaviness.  He has had no orthopnea or paroxysmal nocturnal dyspnea.      Please see my separate note with his full physical examination.      IMPRESSION AND PLAN:  Mr. Fischer is a pleasant 72-year-old gentleman who has a very complex cardiovascular history.  He has a history of paroxysmal atrial fibrillation and paroxysmal atrial flutter for which he is on amiodarone and has undergone atrial flutter ablation.  In the past when we have maintained sinus rhythm, he has had normal LV systolic function.  More recently, his LV systolic function, however, has dropped precipitously for unclear reasons.  His thyroid function has worsened and it may be related to this.  I have referred to Endocrinology and he has an upcoming appointment on Thursday.  In the interim, I have cut his amiodarone from 200 mg daily to 100 mg daily and have referred him back to the  Cardiology to see if there is alternative agents that we can use.  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  At this time, I believe it is best that the patient see an advanced heart failure provider and I have referred him to Giuliana Terry at the [facility].        In addition to his LV dysfunction, he has had a longstanding RV dysfunction with resulting significant tricuspid regurgitation.  I had previously referred him to the [facility] for consideration of tricuspid valve replacement and/or repair.  They elected not to proceed at that time.  Interestingly, on repeat transthoracic echocardiograms.  His tricuspid regurgitation had improved until more recently when it is again worsened.  Unfortunately, at this time I do not have a unifying diagnosis for the patient's cardiomyopathy as he has had both improvement and worsening of his RV and LVEF.  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        Again, I am referring him to an advanced heart failure provider at this time.  I will plan to see the patient back once  he has undergone extensive evaluation at the [facility].  In the interim, I will  continue his low dose beta blocker unchanged.  I will hold off on adding ACE inhibitor since he has developed so much lightheadedness which is on low dose beta blocker.     Outpatient Encounter Prescriptions as of 12/4/2017   Medication Sig Dispense Refill     [DISCONTINUED] amiodarone (PACERONE/CODARONE) 100 MG TABS tablet Take 1 tablet (100 mg) by mouth daily 30 tablet 3     VITAMIN D, CHOLECALCIFEROL, PO Take 2,000 Units by mouth daily       [DISCONTINUED] metoprolol (TOPROL-XL) 25 MG 24 hr tablet Take 0.5 tablets (12.5 mg) by mouth daily 30 tablet 11     torsemide (DEMADEX) 5 MG tablet Take 1 tablet (5 mg) by mouth daily Down to 5mg daily 90 tablet 1     apixaban ANTICOAGULANT (ELIQUIS) 5 MG tablet Take 1 tablet (5 mg) by mouth 2 times daily 180 tablet 3     [DISCONTINUED] amiodarone (PACERONE/CODARONE) 200 MG tablet Take 1 tablet (200 mg) by mouth daily 30 tablet 3     No facility-administered encounter medications on file as of 12/4/2017.      Again, thank you for allowing me to participate in the care of your patient.      Sincerely,    Prabhakar Castro MD     [facility] [place]

## 2017-12-04 NOTE — PROGRESS NOTES
HPI and Plan:   See dictation    Orders Placed This Encounter   Procedures     Follow-Up with Advanced Heart Failure Clinic     Follow-Up with Electrophysiologist       Orders Placed This Encounter   Medications     amiodarone (PACERONE/CODARONE) 100 MG TABS tablet     Sig: Take 1 tablet (100 mg) by mouth daily     Dispense:  30 tablet     Refill:  3       Medications Discontinued During This Encounter   Medication Reason     amiodarone (PACERONE/CODARONE) 200 MG tablet Reorder         Encounter Diagnoses   Name Primary?     Dilated cardiomyopathy (H)      Paroxysmal atrial fibrillation (H)        CURRENT MEDICATIONS:  Current Outpatient Prescriptions   Medication Sig Dispense Refill     amiodarone (PACERONE/CODARONE) 100 MG TABS tablet Take 1 tablet (100 mg) by mouth daily 30 tablet 3     VITAMIN D, CHOLECALCIFEROL, PO Take 2,000 Units by mouth daily       metoprolol (TOPROL-XL) 25 MG 24 hr tablet Take 0.5 tablets (12.5 mg) by mouth daily 30 tablet 11     torsemide (DEMADEX) 5 MG tablet Take 1 tablet (5 mg) by mouth daily Down to 5mg daily 90 tablet 1     apixaban ANTICOAGULANT (ELIQUIS) 5 MG tablet Take 1 tablet (5 mg) by mouth 2 times daily 180 tablet 3     [DISCONTINUED] amiodarone (PACERONE/CODARONE) 200 MG tablet Take 1 tablet (200 mg) by mouth daily 30 tablet 3       ALLERGIES     Allergies   Allergen Reactions     Ace Inhibitors      Due to CKD, renal insufficiency       PAST MEDICAL HISTORY:  Past Medical History:   Diagnosis Date     Atrial flutter (H) 11/23/15     AVNRT (AV jade re-entry tachycardia) (H)     status post     BPH (benign prostatic hyperplasia)      CAD (coronary artery disease)     Abnormal Nuc 2014     Cardiomyopathy (H)     Echo 11/2015- EF 35-40%     CHF (congestive heart failure) (H) 11/25/2015    Echo 11/2015- EF 35-40%      CKD (chronic kidney disease)     not on ACE/ARB due to insufficiency     Elevated PSA      Hypercholesterolemia 7/10/2014     Malaria      Mild pulmonic  regurgitation and RV dysfunction by prior echocardiogram      Non-rheumatic tricuspid valve insufficiency      Paroxysmal a-fib (H)      Persistent atrial fibrillation with rapid ventricular response (H)      Pigment deposition      Systolic heart failure (H)     Echo 11/2015- EF 35-40%     Tricuspid regurgitation     Mod 2016       PAST SURGICAL HISTORY:  Past Surgical History:   Procedure Laterality Date     ANESTHESIA CARDIOVERSION N/A 12/6/2016    Procedure: ANESTHESIA CARDIOVERSION;  Surgeon: GENERIC ANESTHESIA PROVIDER;  Location:  OR     C KP (TRANSESOPHAGEAL ECHO)  12/6/16     CARDIOVERSION  12/14/15     H ABLATION ATRIAL FLUTTER  3/21/16     ORTHOPEDIC SURGERY Left 1957    foot injury repair       FAMILY HISTORY:  Family History   Problem Relation Age of Onset     C.A.D. Mother      Prostate Cancer Father 73       SOCIAL HISTORY:  Social History     Social History     Marital status:      Spouse name: N/A     Number of children: N/A     Years of education: N/A     Social History Main Topics     Smoking status: Never Smoker     Smokeless tobacco: Never Used     Alcohol use Yes      Comment: rare     Drug use: No     Sexual activity: Not Currently     Other Topics Concern     Caffeine Concern No     Occupational Exposure No     none     Sleep Concern No     Stress Concern No     Weight Concern No     Special Diet Yes     low salt     Exercise Yes     walking     Seat Belt Yes     Social History Narrative       Review of Systems:  Skin:  Positive for   dry itchy skin   Eyes:  Positive for glasses    ENT:  Negative      Respiratory:  Positive for dyspnea on exertion if walks to fast, then gets some SOB    Cardiovascular:    Positive for;dizziness;fatigue;heaviness;edema dizziness when getting up quickly, chest heaviness when working too hard  Gastroenterology: Negative      Genitourinary:  Negative      Musculoskeletal:  Positive for joint pain has worsened  Neurologic:  Positive for headaches   "  Psychiatric:  Negative      Heme/Lymph/Imm:  Positive for allergies RX  Endocrine:  Positive for cold intolerance last TSH was elevated; more sensitive to cold    Physical Exam:  Vitals: /80 (BP Location: Right arm, Patient Position: Chair, Cuff Size: Adult Regular)  Pulse 56  Ht 1.727 m (5' 8\")  Wt 71.4 kg (157 lb 4.8 oz)  BMI 23.92 kg/m2    Constitutional:  cooperative;well nourished;in no acute distress        Skin:  warm and dry to the touch          Head:  normocephalic        Eyes:  sclera white        Lymph:      ENT:  no pallor or cyanosis        Neck:  no stiffness        Respiratory:  clear to auscultation         Cardiac: regular rhythm       systolic ejection murmur                                                 GI:  abdomen soft        Extremities and Muscular Skeletal:        trace trace      Neurological:  affect appropriate        Psych:  affect appropriate, oriented to time, person and place        CC  Prabhakar Castro MD  [address]  [place] [address]              "

## 2017-12-05 NOTE — PROGRESS NOTES
HISTORY OF PRESENT ILLNESS:  Mr. 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  He returns in close clinical followup after undergoing recent right and left heart catheterization.        The patient's right heart catheterization showed a cardiac index of 1.04, PA pressure 28/16, wedge pressure of 20.  His left heart catheterization showed trivial coronary artery disease.  There was evidence on the LV gram for trabeculated ventricle.      In addition since I have seen the patient, his TSH has come back as a part of his amiodarone monitoring and has come back significantly elevated at 32.      Interestingly, the patient has been fairly well compensated from a cardiovascular standpoint.  On exam, he is not volume overloaded.  At our last visit, I placed him on metoprolol succinate 25 mg daily, but he developed lightheadedness.  He has been taking 12.5 mg daily without difficulty.  He continues to report dyspnea with exertion but has been having no shortness of breath at rest.  He denies any chest pain or chest heaviness.  He has had no orthopnea or paroxysmal nocturnal dyspnea.      Please see my separate note with his full physical examination.      IMPRESSION AND PLAN:  Mr. Fischer is a pleasant 72-year-old gentleman who has a very  complex cardiovascular history.  He has a history of paroxysmal atrial fibrillation and paroxysmal atrial flutter for which he is on amiodarone and has undergone atrial flutter ablation.  In the past when we have maintained sinus rhythm, he has had normal LV systolic function.  More recently, his LV systolic function, however, has dropped precipitously for unclear reasons.  His thyroid function has worsened and it may be related to this.  I have referred to Endocrinology and he has an upcoming appointment on Thursday.  In the interim, I have cut his amiodarone from 200 mg daily to 100 mg daily and have referred him back to the  Cardiology to see if there is alternative agents that we can use.  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  At this time, I believe it is best that the patient see an advanced heart failure provider and I have referred him to Giuliana Terry at the [facility].        In addition to his LV dysfunction, he has had a longstanding RV dysfunction with resulting significant tricuspid regurgitation.  I had previously referred him to the [facility] for consideration of tricuspid valve replacement and/or repair.  They elected not to proceed at that time.  Interestingly, on repeat transthoracic echocardiograms.  His tricuspid regurgitation had improved until more recently when it is again worsened.  Unfortunately, at this time I do not have a unifying diagnosis for the patient's cardiomyopathy as he has had both improvement and worsening of his RV and LVEF.  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        Again, I am referring him to an advanced heart failure provider at this time.  I will plan to see the patient back once he has undergone extensive evaluation at the [facility].  In the interim, I will  continue his low dose beta blocker unchanged.  I will hold off on adding ACE inhibitor since he has developed so much  lightheadedness which is on low dose beta blocker.         TYRONE NEWBY MD             D: 2017 16:08   T: 2017 18:12   MT: JOYCE      Name:     TREY BURGER   MRN:      [id number]        Account:      [id number]   :      1945           Service Date: 2017      Document: [id number]

## 2017-12-07 ENCOUNTER — TRANSFERRED RECORDS (OUTPATIENT)
Dept: HEALTH INFORMATION MANAGEMENT | Facility: CLINIC | Age: 72
End: 2017-12-07

## 2017-12-08 NOTE — PROGRESS NOTES
"Outpatient Referral-Advanced Heart Failure (LVAD/Transplant) 17    Demographic Information on Misael Fischer:    Misael Fischer  Gender: male  : 1945  [address]  [facility] [place] [address] [phone] (home) none (work)    Medical Record: [id number]  Social Security Number:   Pharmacy:    [facility] - [address] [address] [place] [employer] - [place] [address]  Primary Care Provider: Kevan Bull    Parent's names are: Data Unavailable (mother) and Data Unavailable (father).    Insurance: Payor: MEDICARE / Plan: MEDICARE / Product Type: Medicare /      Misael Fischer is a patient who was referred by Dr. Monica MD, for advanced heart failure options. A call was made to the patient to introduce our program, review patient's PMH, and answer any questions/concerns he may have.                Height:    Ht Readings from Last 2 Encounters:   17 1.727 m (5' 8\")   17 1.727 m (5' 8\")    Weight:    Wt Readings from Last 2 Encounters:   17 71.4 kg (157 lb 4.8 oz)   17 69.9 kg (154 lb 1.6 oz)       BMI:  There is no height or weight on file to calculate BMI.    Allergies:     Allergies   Allergen Reactions     Ace Inhibitors      Due to CKD, renal insufficiency     Past Medical History:   Afib, s/p cardioversion x2 and ablation  NICM (unclear etiology)  Valvular disease-previously evaluated by [facility]    Heart Failure Symptoms:  Fluid/Weight Status: Stable at this time, has felt better after ablation until recently  Fatigue:  Yes  Edema:  Mild LE edema   Dizziness/Lightheadedness:  Yes-having difficulty uptitrating meds, specifically beta blockers   Palpitations:  Occassional  Chest Pain:  No   Syncope:  No   Nausea/Appetite:  Decrease appetite, no N/V  Exercise intolerance:  Yes-SOB with exertion      Previous Chest Surgery: No     Distance from [facility]: 45-60 Minutes   Device: None    Brand: N/A   Recent Shocks:  " N/A    Smoking Status: Nonsmoker      Plan: Lives with wife, although she has physical limitations as well.  Plan to bring patient in to see Dr. Wetzel on 12/14/17 due to urgency of referral (patient's CI 1.04 on last RHC) and requested to be seen within a few weeks.  Patient verbalized understanding of the plan and agrees to call Transplant Coordinator with any further questions or concerns.

## 2017-12-09 ENCOUNTER — PRE VISIT (OUTPATIENT)
Dept: CARDIOLOGY | Facility: CLINIC | Age: 72
End: 2017-12-09

## 2017-12-09 NOTE — TELEPHONE ENCOUNTER
Pre visit nursing summary    HPI: Misael Fischer is a new patient here in clinic for eval of his heart failure. Hospitalization Nov. 2017.

## 2017-12-13 ENCOUNTER — OFFICE VISIT (OUTPATIENT)
Dept: CARDIOLOGY | Facility: CLINIC | Age: 72
End: 2017-12-13
Payer: MEDICARE

## 2017-12-13 VITALS
HEART RATE: 52 BPM | DIASTOLIC BLOOD PRESSURE: 78 MMHG | HEIGHT: 68 IN | BODY MASS INDEX: 23.19 KG/M2 | WEIGHT: 153 LBS | SYSTOLIC BLOOD PRESSURE: 124 MMHG

## 2017-12-13 DIAGNOSIS — I36.1 NON-RHEUMATIC TRICUSPID VALVE INSUFFICIENCY: Primary | ICD-10-CM

## 2017-12-13 DIAGNOSIS — I48.92 ATRIAL FLUTTER, UNSPECIFIED TYPE (H): ICD-10-CM

## 2017-12-13 DIAGNOSIS — I48.0 PAROXYSMAL ATRIAL FIBRILLATION (H): ICD-10-CM

## 2017-12-13 PROCEDURE — 99214 OFFICE O/P EST MOD 30 MIN: CPT | Performed by: INTERNAL MEDICINE

## 2017-12-13 RX ORDER — LEVOTHYROXINE SODIUM 100 UG/1
100 CAPSULE ORAL DAILY
COMMUNITY
End: 2018-02-16

## 2017-12-13 NOTE — MR AVS SNAPSHOT
After Visit Summary   12/13/2017    Misael Fischer    MRN: [id number]           Patient Information     Date Of Birth          1945        Visit Information        Provider Department      12/13/2017 3:45 PM Ivan Larkin MD [facility]        Today's Diagnoses     Non-rheumatic tricuspid valve insufficiency    -  1    Paroxysmal atrial fibrillation (H)        Atrial flutter, unspecified type (H)           Follow-ups after your visit        Your next 10 appointments already scheduled     Dec 14, 2017 12:30 PM CST   LAB with  LAB   [facility])    [address] [place] [address]   [phone]           Please do not eat 10-12 hours before your appointment if you are coming in fasting for labs on lipids, cholesterol, or glucose (sugar). This does not apply to pregnant women. Water, hot tea and black coffee (with nothing added) are okay. Do not drink other fluids, diet soda or chew gum.            Dec 14, 2017  1:00 PM CST   (Arrive by 12:45 PM)   NEW HEART FAILURE with Mitchell Wetzel MD   [facility])    [address] [place] [address] [phone]            Dec 15, 2017 10:30 AM CST   Card Cardpul Stress Tst Adult with [facility]   [facility] ([facility])    [address]  [place] [address]               Dec 15, 2017  1:30 PM CST   ZIOPATCH MONITOR with [facility] - [facility] ([facility])    [address]  [place] [address] [phone]              Future tests that were ordered for you today     Open Future Orders        Priority Expected Expires Ordered    Zio Patch Monitor Routine 12/20/2017 12/13/2018 12/13/2017            Who to contact     If you have questions or need follow up information  "about today's clinic visit or your schedule please contact [facility] directly at [phone].  Normal or non-critical lab and imaging results will be communicated to you by [employer], letter or phone within 4 business days after the clinic has received the results. If you do not hear from us within 7 days, please contact the clinic through [employer] or phone. If you have a critical or abnormal lab result, we will notify you by phone as soon as possible.  Submit refill requests through [employer] or call your pharmacy and they will forward the refill request to us. Please allow 3 business days for your refill to be completed.          Additional Information About Your Visit        [employer] Information     [employer] gives you secure access to your electronic health record. If you see a primary care provider, you can also send messages to your care team and make appointments. If you have questions, please call your primary care clinic.  If you do not have a primary care provider, please call [phone] and they will assist you.        Care EveryWhere ID     This is your Care EveryWhere ID. This could be used by other organizations to access your [place] medical records  [id number]        Your Vitals Were     Pulse Height BMI (Body Mass Index)             52 1.727 m (5' 7.99\") 23.27 kg/m2          Blood Pressure from Last 3 Encounters:   12/13/17 124/78   12/04/17 122/80   11/21/17 107/78    Weight from Last 3 Encounters:   12/13/17 69.4 kg (153 lb)   12/04/17 71.4 kg (157 lb 4.8 oz)   11/21/17 69.9 kg (154 lb 1.6 oz)              We Performed the Following     Follow-Up with Electrophysiologist          Today's Medication Changes          These changes are accurate as of: 12/13/17 11:59 PM.  If you have any questions, ask your nurse or doctor.               Stop taking these medicines if you haven't already. Please contact your care team if you have questions.     amiodarone 100 MG " Tabs tablet   Commonly known as:  PACERONE/CODARONE   Stopped by:  Ivan Larkin MD           metoprolol 25 MG 24 hr tablet   Commonly known as:  TOPROL-XL   Stopped by:  Ivan Larkin MD                    Primary Care Provider Office Phone # Fax #    Kevan Lamberto Bull [phone] [address]  [facility] [address]        Equal Access to Services     [facility]: Hadii aad ku hadasho Soomaali, waaxda luqadaha, qaybta kaalmada adeegyada, waxay idiin hayaan adeeg kharash la'aan . So [facility] [phone].    ATENCIÓN: Si habla español, tiene a stewart disposición servicios gratuitos de asistencia lingüística. Noame al [phone].    We comply with applicable federal civil rights laws and [place] laws. We do not discriminate on the basis of race, color, national origin, age, disability, sex, sexual orientation, or gender identity.            Thank you!     Thank you for choosing [facility]  for your care. Our goal is always to provide you with excellent care. Hearing back from our patients is one way we can continue to improve our services. Please take a few minutes to complete the written survey that you may receive in the mail after your visit with us. Thank you!             Your Updated Medication List - Protect others around you: Learn how to safely use, store and throw away your medicines at www.disposemymeds.org.          This list is accurate as of: 12/13/17 11:59 PM.  Always use your most recent med list.                   Brand Name Dispense Instructions for use Diagnosis    apixaban ANTICOAGULANT 5 MG tablet    ELIQUIS    180 tablet    Take 1 tablet (5 mg) by mouth 2 times daily    Persistent atrial fibrillation (H)       Levothyroxine Sodium 100 MCG Caps      Take 100 mg by mouth daily        torsemide 5 MG tablet    DEMADEX    90 tablet    Take 1 tablet (5 mg) by mouth daily Down to 5mg daily    Non-rheumatic tricuspid valve  insufficiency       VITAMIN D (CHOLECALCIFEROL) PO      Take 2,000 Units by mouth daily

## 2017-12-14 ENCOUNTER — OFFICE VISIT (OUTPATIENT)
Dept: CARDIOLOGY | Facility: CLINIC | Age: 72
End: 2017-12-14
Attending: INTERNAL MEDICINE
Payer: MEDICARE

## 2017-12-14 ENCOUNTER — HOSPITAL ENCOUNTER (OUTPATIENT)
Facility: CLINIC | Age: 72
End: 2017-12-14
Admitting: INTERNAL MEDICINE
Payer: COMMERCIAL

## 2017-12-14 VITALS
DIASTOLIC BLOOD PRESSURE: 86 MMHG | OXYGEN SATURATION: 100 % | WEIGHT: 151.2 LBS | SYSTOLIC BLOOD PRESSURE: 135 MMHG | BODY MASS INDEX: 22.91 KG/M2 | HEART RATE: 54 BPM | HEIGHT: 68 IN

## 2017-12-14 DIAGNOSIS — I50.20 SYSTOLIC CONGESTIVE HEART FAILURE, UNSPECIFIED CONGESTIVE HEART FAILURE CHRONICITY: ICD-10-CM

## 2017-12-14 DIAGNOSIS — I42.0 DILATED CARDIOMYOPATHY (H): ICD-10-CM

## 2017-12-14 DIAGNOSIS — I48.0 PAROXYSMAL ATRIAL FIBRILLATION (H): ICD-10-CM

## 2017-12-14 DIAGNOSIS — I42.9 CARDIOMYOPATHY (H): ICD-10-CM

## 2017-12-14 LAB
ABO + RH BLD: NORMAL
ABO + RH BLD: NORMAL
ALBUMIN SERPL-MCNC: 4.2 G/DL (ref 3.4–5)
ALP SERPL-CCNC: 123 U/L (ref 40–150)
ALT SERPL W P-5'-P-CCNC: 42 U/L (ref 0–70)
ANION GAP SERPL CALCULATED.3IONS-SCNC: 7 MMOL/L (ref 3–14)
AST SERPL W P-5'-P-CCNC: 47 U/L (ref 0–45)
BASOPHILS # BLD AUTO: 0.1 10E9/L (ref 0–0.2)
BASOPHILS NFR BLD AUTO: 0.9 %
BILIRUB SERPL-MCNC: 1.4 MG/DL (ref 0.2–1.3)
BLD GP AB SCN SERPL QL: NORMAL
BLOOD BANK CMNT PATIENT-IMP: NORMAL
BUN SERPL-MCNC: 30 MG/DL (ref 7–30)
CALCIUM SERPL-MCNC: 8.9 MG/DL (ref 8.5–10.1)
CHLORIDE SERPL-SCNC: 103 MMOL/L (ref 94–109)
CO2 SERPL-SCNC: 27 MMOL/L (ref 20–32)
CREAT SERPL-MCNC: 2.17 MG/DL (ref 0.66–1.25)
DIFFERENTIAL METHOD BLD: ABNORMAL
EOSINOPHIL # BLD AUTO: 0.1 10E9/L (ref 0–0.7)
EOSINOPHIL NFR BLD AUTO: 1.7 %
ERYTHROCYTE [DISTWIDTH] IN BLOOD BY AUTOMATED COUNT: 15.7 % (ref 10–15)
GFR SERPL CREATININE-BSD FRML MDRD: 30 ML/MIN/1.7M2
GLUCOSE SERPL-MCNC: 95 MG/DL (ref 70–99)
HCT VFR BLD AUTO: 53 % (ref 40–53)
HGB BLD-MCNC: 17.5 G/DL (ref 13.3–17.7)
IMM GRANULOCYTES # BLD: 0 10E9/L (ref 0–0.4)
IMM GRANULOCYTES NFR BLD: 0.2 %
LYMPHOCYTES # BLD AUTO: 0.9 10E9/L (ref 0.8–5.3)
LYMPHOCYTES NFR BLD AUTO: 16.3 %
MCH RBC QN AUTO: 32.5 PG (ref 26.5–33)
MCHC RBC AUTO-ENTMCNC: 33 G/DL (ref 31.5–36.5)
MCV RBC AUTO: 98 FL (ref 78–100)
MONOCYTES # BLD AUTO: 0.4 10E9/L (ref 0–1.3)
MONOCYTES NFR BLD AUTO: 7 %
NEUTROPHILS # BLD AUTO: 3.9 10E9/L (ref 1.6–8.3)
NEUTROPHILS NFR BLD AUTO: 73.9 %
NRBC # BLD AUTO: 0 10*3/UL
NRBC BLD AUTO-RTO: 0 /100
PLATELET # BLD AUTO: 145 10E9/L (ref 150–450)
POTASSIUM SERPL-SCNC: 4.8 MMOL/L (ref 3.4–5.3)
PROT SERPL-MCNC: 7.7 G/DL (ref 6.8–8.8)
RBC # BLD AUTO: 5.39 10E12/L (ref 4.4–5.9)
SODIUM SERPL-SCNC: 137 MMOL/L (ref 133–144)
SPECIMEN EXP DATE BLD: NORMAL
WBC # BLD AUTO: 5.3 10E9/L (ref 4–11)

## 2017-12-14 PROCEDURE — 80053 COMPREHEN METABOLIC PANEL: CPT | Performed by: INTERNAL MEDICINE

## 2017-12-14 PROCEDURE — 86901 BLOOD TYPING SEROLOGIC RH(D): CPT | Performed by: INTERNAL MEDICINE

## 2017-12-14 PROCEDURE — 99215 OFFICE O/P EST HI 40 MIN: CPT | Mod: ZP | Performed by: INTERNAL MEDICINE

## 2017-12-14 PROCEDURE — 86850 RBC ANTIBODY SCREEN: CPT | Performed by: INTERNAL MEDICINE

## 2017-12-14 PROCEDURE — 36415 COLL VENOUS BLD VENIPUNCTURE: CPT | Performed by: INTERNAL MEDICINE

## 2017-12-14 PROCEDURE — 86900 BLOOD TYPING SEROLOGIC ABO: CPT | Performed by: INTERNAL MEDICINE

## 2017-12-14 PROCEDURE — 99212 OFFICE O/P EST SF 10 MIN: CPT | Mod: ZF

## 2017-12-14 PROCEDURE — 85025 COMPLETE CBC W/AUTO DIFF WBC: CPT | Performed by: INTERNAL MEDICINE

## 2017-12-14 RX ORDER — LIDOCAINE 40 MG/G
CREAM TOPICAL
Status: CANCELLED | OUTPATIENT
Start: 2017-12-14

## 2017-12-14 ASSESSMENT — PAIN SCALES - GENERAL: PAINLEVEL: NO PAIN (0)

## 2017-12-14 NOTE — NURSING NOTE
Chief Complaint   Patient presents with     New Patient     New patient Heart failure, AFib     Vitals were taken and medications were reconciled.  Richard Rich, FERNANDO  12:36 PM

## 2017-12-14 NOTE — NURSING NOTE
Cardiac Testing: Patient given instructions regarding  echocardiogram . Discussed purpose, preparation, procedure and when to expect results reported back to the patient. Patient demonstrated understanding of this information and agreed to call with further questions or concerns.    Labs: Patient was given results of the laboratory testing obtained today. Patient was instructed to return for the next laboratory testing in 2-3 weeks . Patient demonstrated understanding of this information and agreed to call with further questions or concerns.     Return Appointment: Follow up with Dr Wetzel after testing completed. Patient given instructions regarding scheduling next clinic visit. Patient demonstrated understanding of this information and agreed to call with further questions or concerns.    Med Reconcile: Reviewed and verified all current medications with the patient. The updated medication list was printed and given to the patient.    Right Heart Catheterization: Patient was instructed regarding right heart catheterization, including discussion of the procedure, preparation, intra-procedural steps, and recovery at home.   Patient demonstrated understanding of this information and agreed to call with further questions or concerns.    Patient stated he understood all health information given and agreed to call with further questions or concerns.    Reyes Sumner, RN  RN Care Coordinator  [facility]  [phone]

## 2017-12-14 NOTE — MR AVS SNAPSHOT
After Visit Summary   12/14/2017    Misael Fischer    MRN: [id number]           Patient Information     Date Of Birth          1945        Visit Information        Provider Department      12/14/2017 1:00 PM Mitchell Wetzel MD [facility]        Today's Diagnoses     Dilated cardiomyopathy (H)        Paroxysmal atrial fibrillation (H)        Systolic congestive heart failure, unspecified congestive heart failure chronicity (H)          Care Instructions    You were seen today in the Cardiovascular Clinic at the [facility].      Cardiology Providers you saw during your visit: Dr Mitchell Wetzel     Results:  Dr Wetzel reviewed the results of your labs     Recommendations:    1.  Will schedule outpatient right heart catherization on Thursday January 4th. This will be done at the [facility], [address] [place] [address], [place] [address]. You are to check in at the [facility] at 8:30 AM. Please enter the main entrance of the hospital and walk past the coffee shop and gift shop on the right side of the gamble and the bank of elevators. The [facility] Waiting room is located on the left side of the gamble.     Pre procedure instructions:  1. Do not eat or drink 6 hours prior.  2. You may take your usual morning medications with a sip of water the morning of your procedure.  3. Make arrangements to have someone stay with you the night after your procedure.  4.  Please hold your Eliquis 2 days prior to your procedure.        2. We will schedule an echocardiogram     Follow-up:  Follow up with Dr Wetzel after testing completed.      For emergencies call 911.     For any scheduling needs, please call [phone].     Thank you for your visit today!      Please call if you have any questions or concerns.     [phone], press option #1 to be routed to the [place]  If you have an urgent need after business hours or over the weekend please call [phone] and  ask for the cardiology fellow on call.               Follow-ups after your visit        Additional Services     Follow-Up with Advanced Heart Failure Cardiologist           Follow-Up with Cardiologist       With labs, After echo and RHC completed.  Schedule echo same day as RHC                  Your next 10 appointments already scheduled     Dec 15, 2017 10:30 AM CST   Card Cardpul Stress Tst Adult with UUEKGS   [facility] ([facility])    [address] [address]               Dec 15, 2017  1:30 PM CST   ZIOPATCH MONITOR with TRICE   [facility] - [facility] ([facility])    [address]  [place] [address]   [phone]            Jan 04, 2018  8:30 AM CST   LAB with ACUTE CARE LAB [facility] ([facility])    [address] [address]              Please do not eat 10-12 hours before your appointment if you are coming in fasting for labs on lipids, cholesterol, or glucose (sugar). This does not apply to pregnant women. Water, hot tea and black coffee (with nothing added) are okay. Do not drink other fluids, diet soda or chew gum.            Jan 04, 2018  9:00 AM CST   Procedure - 2.5 hour with U2A ROOM 17   [address] [facility] ([facility])    [address] [facility] [address]               Jan 04, 2018 10:00 AM CST   Heart Cath Right Heart Cath with UUHCVR4   [facility])    [address] [facility] [address] [phone]            Jan 04, 2018 11:30 AM CST   Ech Complete with UUECHR2   [facility],  Echocardiography ([facility])    [address] [facility] [address] [phone]           1. Please bring or wear a comfortable two-piece  outfit. 2. You may eat, drink and take your normal medicines. 3. For any questions that cannot be answered, please contact the ordering physician            Jan 11, 2018  1:30 PM CST   Lab with  LAB   [facility] ([facility])    [address]  1st Floor  [facility] [id number] [phone]            Jan 11, 2018  2:00 PM CST   (Arrive by 1:45 PM)   RETURN HEART FAILURE with Mitchell Wetzel MD   [facility] Care ([facility])    [address]  3rd Floor  [facility] [id number] [phone]              Future tests that were ordered for you today     Open Future Orders        Priority Expected Expires Ordered    Follow-Up with Advanced Heart Failure Cardiologist Routine 1/11/2018 3/21/2018 12/14/2017    Heart Cath Right heart cath Routine  12/14/2018 12/14/2017    Follow-Up with Cardiologist Routine 1/31/2018 3/14/2018 12/14/2017    Basic metabolic panel Routine 1/31/2018 3/14/2018 12/14/2017    Echo Complete Routine 1/13/2018 3/14/2018 12/14/2017    Zio Patch Monitor Routine 12/20/2017 12/13/2018 12/13/2017            Who to contact     If you have questions or need follow up information about today's clinic visit or your schedule please contact [facility] directly at [phone].  Normal or non-critical lab and imaging results will be communicated to you by MyChart, letter or phone within 4 business days after the clinic has received the results. If you do not hear from us within 7 days, please contact the clinic through [employer] or phone. If you have a critical or abnormal lab result, we will notify you by phone as soon as possible.  Submit refill requests through [email or web address] or call your pharmacy and they will forward the refill request to us. Please allow 3 business days for your refill to be completed.          Additional Information About Your Visit        [email or web address] Information     [email or web address] gives you secure access to your electronic  "health record. If you see a primary care provider, you can also send messages to your care team and make appointments. If you have questions, please call your primary care clinic.  If you do not have a primary care provider, please call [phone] and they will assist you.        Care EveryWhere ID     This is your Care EveryWhere ID. This could be used by other organizations to access your [place] medical records  [id number]        Your Vitals Were     Pulse Height Pulse Oximetry BMI (Body Mass Index)          54 1.727 m (5' 8\") 100% 22.99 kg/m2         Blood Pressure from Last 3 Encounters:   12/14/17 135/86   12/13/17 124/78   12/04/17 122/80    Weight from Last 3 Encounters:   12/14/17 68.6 kg (151 lb 3.2 oz)   12/13/17 69.4 kg (153 lb)   12/04/17 71.4 kg (157 lb 4.8 oz)              We Performed the Following     Follow-Up with Advanced Heart Failure Clinic        Primary Care Provider Office Phone # Fax #    Kevan Bull [phone] [address]  [facility] [address]        Equal Access to Services     MAGALYS [facility] Center[facility]ILARY : Hadii aad ku hadasho Soomaali, waaxda luqadaha, qaybta kaalmada adeegyada, lakhwinder lozanon sangeeta solomon. So [place] [phone].    ATENCIÓN: Si habla español, tiene a stewart disposición servicios gratuitos de asistencia lingüística. Llame al [phone].    We comply with applicable federal civil rights laws and [place] laws. We do not discriminate on the basis of race, color, national origin, age, disability, sex, sexual orientation, or gender identity.            Thank you!     Thank you for choosing [facility]  for your care. Our goal is always to provide you with excellent care. Hearing back from our patients is one way we can continue to improve our services. Please take a few minutes to complete the written survey that you may receive in the mail after your visit with us. Thank you!             Your Updated Medication List - " Protect others around you: Learn how to safely use, store and throw away your medicines at www.disposemymeds.org.          This list is accurate as of: 12/14/17  1:49 PM.  Always use your most recent med list.                   Brand Name Dispense Instructions for use Diagnosis    apixaban ANTICOAGULANT 5 MG tablet    ELIQUIS    180 tablet    Take 1 tablet (5 mg) by mouth 2 times daily    Persistent atrial fibrillation (H)       Levothyroxine Sodium 100 MCG Caps      Take 100 mg by mouth daily        torsemide 5 MG tablet    DEMADEX    90 tablet    Take 1 tablet (5 mg) by mouth daily Down to 5mg daily    Non-rheumatic tricuspid valve insufficiency       VITAMIN D (CHOLECALCIFEROL) PO      Take 2,000 Units by mouth daily

## 2017-12-14 NOTE — PROGRESS NOTES
December 14, 2017    Dear Dr. Castro and Chaya,    I had the please of meeting Mr. Fischer today in the Advanced Heart Failure Transplant Clinic at [facility].   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 He also has chronic kidney disease. He also had a recent abnormal stress test and follow up LHC showed no significant CAD but RHC with severely deranged hemodynamics and and a CI close to 1.0 and a wedge of 20. Also, he has recently diagnosed hypothyroidism with TSH elevated at 32. He was referred to endocrine for this recently and his amiodarone was reduced to 100mg daily. His most recent LV gram suggested severely reduced EF, low flow, at least moderate MR and a trabeculated LV was noted. Non-compaction was suggested, however he did have a cardiac  MRI in 2016 at [place] and this was not noted at the time and neither was evidence for ARVD. Unclear if contrast was used for possible infiltrative disease. He also did have difficulties uptitrating his cardiac medications and developed symptoms with 25mg of metoprolol succinate.   He did see Dr. Larkin yesterday and amiodarone as well as metoprolol were stopped at the time.   Today he reports dyspnea with exertion but no shortness of breath at rest.  He denies any chest pain or chest heaviness.  He has had no orthopnea or paroxysmal nocturnal dyspnea. He does feel much better since thyroid replacement has been started and is also better since yesterday.      PAST MEDICAL HISTORY:  Past Medical History:   Diagnosis Date     Atrial flutter (H) 11/23/15     AVNRT (AV jade re-entry tachycardia) (H)     status post     BPH (benign prostatic hyperplasia)      CAD (coronary artery disease)     Abnormal  Nuc 2014     Cardiomyopathy (H)     Echo 11/2015- EF 35-40%     CHF (congestive heart failure) (H) 11/25/2015    Echo 11/2015- EF 35-40%      CKD (chronic kidney disease)     not on ACE/ARB due to insufficiency     Elevated PSA      Hypercholesterolemia 7/10/2014     Malaria      Mild pulmonic regurgitation and RV dysfunction by prior echocardiogram      Non-rheumatic tricuspid valve insufficiency      Paroxysmal a-fib (H)      Persistent atrial fibrillation with rapid ventricular response (H)      Pigment deposition      Systolic heart failure (H)     Echo 11/2015- EF 35-40%     Tricuspid regurgitation     Mod 2016     FAMILY HISTORY:  Family History   Problem Relation Age of Onset     C.A.D. Mother      Prostate Cancer Father 73     SOCIAL HISTORY:  Social History     Social History     Marital status:      Spouse name: N/A     Number of children: N/A     Years of education: N/A     Social History Main Topics     Smoking status: Never Smoker     Smokeless tobacco: Never Used     Alcohol use Yes      Comment: rare     Drug use: No     Sexual activity: Not Currently     Other Topics Concern     Caffeine Concern No     Occupational Exposure No     none     Sleep Concern No     Stress Concern No     Weight Concern No     Special Diet Yes     low salt     Exercise Yes     walking     Seat Belt Yes     Social History Narrative     CURRENT MEDICATIONS:  Current Outpatient Prescriptions   Medication     Levothyroxine Sodium 100 MCG CAPS     VITAMIN D, CHOLECALCIFEROL, PO     torsemide (DEMADEX) 5 MG tablet     apixaban ANTICOAGULANT (ELIQUIS) 5 MG tablet     No current facility-administered medications for this visit.      ROS:   Constitutional: No fever, chills, or sweats. Weight is 151 lbs 3.2 oz  ENT: No visual disturbance, ear ache, epistaxis, sore throat.   Allergies/Immunologic: Negative.   Respiratory: No cough, hemoptysis.   Cardiovascular: As per HPI.   GI: No nausea, vomiting, hematemesis, melena, or  "hematochezia.   : No urinary frequency, dysuria, or hematuria.   Integument: Negative.   Psychiatric: Pleasant, no major depression noted  Neuro: No focal neurological deficits noted  Endocrinology: Negative.   Musculoskeletal: As per HPI.      EXAM:  /86 (BP Location: Right arm, Patient Position: Chair, Cuff Size: Adult Regular)  Pulse 54  Ht 1.727 m (5' 8\")  Wt 68.6 kg (151 lb 3.2 oz)  SpO2 100%  BMI 22.99 kg/m2  General: appears comfortable, alert and oriented  Head: normocephalic, atraumatic  Eyes: anicteric sclera, EOMI , PERRL  Neck: no adenopathy  Orophyarynx: moist mucosa, no lesions noted  Heart: regular, S1/S2, no murmurs, rubs or gallop. Estimated JVP at 5 cmH2O  Lungs: CTAB, No wheezing.   Abdomen: soft, non-tender, bowel sounds present, no hepatosplenomegaly  Extremities: No LE edema today  Skin: no open lesions noted  Neuro: grossly non-focal     Labs:  Lab Results   Component Value Date    WBC 5.3 12/14/2017    HGB 17.5 12/14/2017    HCT 53.0 12/14/2017     (L) 12/14/2017     12/14/2017    POTASSIUM 4.8 12/14/2017    CHLORIDE 103 12/14/2017    CO2 27 12/14/2017    BUN 30 12/14/2017    CR 2.17 (H) 12/14/2017    GLC 95 12/14/2017    NTBNPI 3875 (H) 11/28/2015    TROPI 0.125 (HH) 11/26/2015    AST 47 (H) 12/14/2017    ALT 42 12/14/2017    ALKPHOS 123 12/14/2017    BILITOTAL 1.4 (H) 12/14/2017    INR 1.16 (H) 11/17/2017     Echocardiogram reviewed 11/2017:  The visual ejection fraction is estimated at 30-35%.  There is moderate global hypokinesia of the left ventricle.  There is severe inferior and inferolateral wall hypokinesis.  The transmitral spectral Doppler flow pattern is suggestive of restrictive  physiology.  The right ventricle is moderate to severely dilated.  The right ventricular systolic function is mild to moderately reduced.  The left atrium is moderate to severely dilated.  The right atrium is severely dilated.  There is moderate (2+) mitral regurgitation.  There " is mod-severe to severe (3-4+) tricuspid regurgitation. Severity of TR  may underestimate PA pressure.  Dilated inferior vena cava  There is mild (1+) pulmonic valvular regurgitation.  As compared with most recent study, there is deterioration in LVEF, and  worsening of mitral regurgitation.    TTE 3/3/2017:  The left ventricle is normal in size. There is mild concentric left  ventricular hypertrophy. Left ventricular systolic function is low normal. The  visual ejection fraction is estimated at 50-55%. Flattened septum is  consistent with RV pressure/volume overload.  The right ventricle is moderately dilated. Mildly decreased right ventricular  systolic function.  The left atrium is mildly dilated. The right atrium is severely dilated. There  is no color Doppler evidence of an atrial shunt.  Moderate to moderately severe tricuspid regurgitation.  No pericardial effusion.  In direct comparison to the previous TTE dated 03/18/2016, there has been an  interval improvement in left ventricular systolic function. Right ventricular  systolic function and the degree of tricuspid regurgitation appear similar.    Cardiac MRI 3/2016: reviewed    ASSESSMENT AND PLAN:  In summary, patient is a 72 year old male with above complex history here for possible advanced therapy evaluation. He did have great workup in the past but also noted multiple recent changes and adjustments, appropriately, in his medical regimen. He was recetly found to be hypothyroid and was started on replacement. His amiodarone as well as metoprolol were reduced and now dcd. All these can make him feel better. I was very concerned due to his severely reduced CI, interestingly though this does not necessarily match up with an EF of 35%. His creatinine has also worse today compared to prior reads that is concerning to me. I have discussed all the above with him and suggested to be admitted and undergo repeat TTE as well as right heart cath. I fear MRI may not  be feasible due to the elevated/worsening creatinine. He may however benefit from inotrope therapy/LVAD evaluation if his CI remains this low.   After lengthy discussion he noted that he wants to give himself some time due to the recent many medication changes and adjustments. While did not want to get admitted, would be OK having TTE and RHC done in early January. We discussed the risks of this but he ultimately requested to go with this.   WIll have Zio tomorrow, stress test and will have TTE with RHC in early January. I will see him afterwards. Given the med changes recently, I will hold off adjusting meds for now. He understood that I am worried about him and low cardiac index.  He will return immediately should he feel worse.      Follow up:   Within 3 weeks    I appreciate the opportunity to participate in the care of Misael YESENIA Fischer . Please do not hesitate to contact me with any further questions.    Sincerely,      Mitchell Wetzel MD     [facility] Division of Cardiology

## 2017-12-15 ENCOUNTER — HOSPITAL ENCOUNTER (OUTPATIENT)
Dept: CARDIOLOGY | Facility: CLINIC | Age: 72
Discharge: HOME | End: 2017-12-15
Attending: INTERNAL MEDICINE | Admitting: INTERNAL MEDICINE
Payer: MEDICARE

## 2017-12-15 DIAGNOSIS — I42.9 CARDIOMYOPATHY (H): ICD-10-CM

## 2017-12-15 DIAGNOSIS — I48.0 PAROXYSMAL ATRIAL FIBRILLATION (H): ICD-10-CM

## 2017-12-15 PROCEDURE — 0296T ZIO PATCH HOLTER: CPT

## 2017-12-15 PROCEDURE — 94621 CARDIOPULM EXERCISE TESTING: CPT

## 2017-12-15 PROCEDURE — 0298T ZZC EXT ECG > 48HR TO 21 DAY REVIEW AND INTERPRETATN: CPT | Performed by: INTERNAL MEDICINE

## 2017-12-15 PROCEDURE — 94621 CARDIOPULM EXERCISE TESTING: CPT | Mod: 26 | Performed by: INTERNAL MEDICINE

## 2018-01-02 ENCOUNTER — TRANSFERRED RECORDS (OUTPATIENT)
Dept: HEALTH INFORMATION MANAGEMENT | Facility: CLINIC | Age: 73
End: 2018-01-02

## 2018-01-10 ENCOUNTER — TELEPHONE (OUTPATIENT)
Dept: CARDIOLOGY | Facility: CLINIC | Age: 73
End: 2018-01-10

## 2018-01-10 NOTE — TELEPHONE ENCOUNTER
Message  Received: Today       Ivan Larkin MD  [facility] Heart Ep Nurse                     No afib nor aflutter noted. pvc 3.4%, unlikely to be contributing to LV dysfunction. No further eval needed from EP perspective.      Writer attempted to call pt with results, but no answer. VM left with results and instructed to call back with any further questions. F/U with Una as per his recommendations. AICHA Hernandez RN.

## 2018-01-12 ENCOUNTER — DOCUMENTATION ONLY (OUTPATIENT)
Dept: CARDIOLOGY | Facility: CLINIC | Age: 73
End: 2018-01-12

## 2018-01-12 NOTE — PROGRESS NOTES
Received fax from [facility],  note from 1/2/18. Sent to scan. Pt follows w/ Dr. Castro in [place].   Марина HORTON

## 2018-02-16 ENCOUNTER — HOSPITAL ENCOUNTER (INPATIENT)
Facility: CLINIC | Age: 73
LOS: 1 days | Discharge: HOME | DRG: 683 | End: 2018-02-17
Attending: EMERGENCY MEDICINE | Admitting: HOSPITALIST
Payer: MEDICARE

## 2018-02-16 DIAGNOSIS — R55 NEAR SYNCOPE: ICD-10-CM

## 2018-02-16 DIAGNOSIS — E87.1 HYPONATREMIA: ICD-10-CM

## 2018-02-16 DIAGNOSIS — N17.9 ACUTE KIDNEY INJURY (H): ICD-10-CM

## 2018-02-16 DIAGNOSIS — R79.89 ELEVATED TROPONIN: ICD-10-CM

## 2018-02-16 PROBLEM — R42 DIZZINESS: Status: ACTIVE | Noted: 2018-02-16

## 2018-02-16 LAB
ANION GAP SERPL CALCULATED.3IONS-SCNC: 10 MMOL/L (ref 3–14)
BASOPHILS # BLD AUTO: 0 10E9/L (ref 0–0.2)
BASOPHILS NFR BLD AUTO: 0.7 %
BUN SERPL-MCNC: 57 MG/DL (ref 7–30)
CALCIUM SERPL-MCNC: 9.2 MG/DL (ref 8.5–10.1)
CHLORIDE SERPL-SCNC: 95 MMOL/L (ref 94–109)
CO2 SERPL-SCNC: 24 MMOL/L (ref 20–32)
CREAT SERPL-MCNC: 3.55 MG/DL (ref 0.66–1.25)
DIFFERENTIAL METHOD BLD: ABNORMAL
EOSINOPHIL # BLD AUTO: 0 10E9/L (ref 0–0.7)
EOSINOPHIL NFR BLD AUTO: 0.2 %
ERYTHROCYTE [DISTWIDTH] IN BLOOD BY AUTOMATED COUNT: 14.6 % (ref 10–15)
GFR SERPL CREATININE-BSD FRML MDRD: 17 ML/MIN/1.7M2
GLUCOSE SERPL-MCNC: 87 MG/DL (ref 70–99)
HCT VFR BLD AUTO: 48.7 % (ref 40–53)
HGB BLD-MCNC: 16.4 G/DL (ref 13.3–17.7)
IMM GRANULOCYTES # BLD: 0 10E9/L (ref 0–0.4)
IMM GRANULOCYTES NFR BLD: 0.2 %
LYMPHOCYTES # BLD AUTO: 1 10E9/L (ref 0.8–5.3)
LYMPHOCYTES NFR BLD AUTO: 17 %
MCH RBC QN AUTO: 33.1 PG (ref 26.5–33)
MCHC RBC AUTO-ENTMCNC: 33.7 G/DL (ref 31.5–36.5)
MCV RBC AUTO: 98 FL (ref 78–100)
MONOCYTES # BLD AUTO: 0.4 10E9/L (ref 0–1.3)
MONOCYTES NFR BLD AUTO: 7 %
NEUTROPHILS # BLD AUTO: 4.4 10E9/L (ref 1.6–8.3)
NEUTROPHILS NFR BLD AUTO: 74.9 %
NRBC # BLD AUTO: 0 10*3/UL
NRBC BLD AUTO-RTO: 0 /100
PLATELET # BLD AUTO: 145 10E9/L (ref 150–450)
POTASSIUM SERPL-SCNC: 5.3 MMOL/L (ref 3.4–5.3)
RBC # BLD AUTO: 4.95 10E12/L (ref 4.4–5.9)
SODIUM SERPL-SCNC: 129 MMOL/L (ref 133–144)
T4 FREE SERPL-MCNC: 1.55 NG/DL (ref 0.76–1.46)
TROPONIN I SERPL-MCNC: 0.11 UG/L (ref 0–0.04)
TSH SERPL DL<=0.005 MIU/L-ACNC: 5.1 MU/L (ref 0.4–4)
WBC # BLD AUTO: 5.9 10E9/L (ref 4–11)

## 2018-02-16 PROCEDURE — 85025 COMPLETE CBC W/AUTO DIFF WBC: CPT | Performed by: EMERGENCY MEDICINE

## 2018-02-16 PROCEDURE — 84443 ASSAY THYROID STIM HORMONE: CPT | Performed by: EMERGENCY MEDICINE

## 2018-02-16 PROCEDURE — 25000132 ZZH RX MED GY IP 250 OP 250 PS 637: Mod: GY | Performed by: HOSPITALIST

## 2018-02-16 PROCEDURE — 93005 ELECTROCARDIOGRAM TRACING: CPT

## 2018-02-16 PROCEDURE — 96360 HYDRATION IV INFUSION INIT: CPT

## 2018-02-16 PROCEDURE — 84484 ASSAY OF TROPONIN QUANT: CPT | Performed by: EMERGENCY MEDICINE

## 2018-02-16 PROCEDURE — A9270 NON-COVERED ITEM OR SERVICE: HCPCS | Mod: GY | Performed by: HOSPITALIST

## 2018-02-16 PROCEDURE — 99223 1ST HOSP IP/OBS HIGH 75: CPT | Mod: AI | Performed by: HOSPITALIST

## 2018-02-16 PROCEDURE — 99285 EMERGENCY DEPT VISIT HI MDM: CPT | Mod: 25

## 2018-02-16 PROCEDURE — 12000007 ZZH R&B INTERMEDIATE

## 2018-02-16 PROCEDURE — 25000128 H RX IP 250 OP 636: Performed by: EMERGENCY MEDICINE

## 2018-02-16 PROCEDURE — 80048 BASIC METABOLIC PNL TOTAL CA: CPT | Performed by: EMERGENCY MEDICINE

## 2018-02-16 PROCEDURE — 96361 HYDRATE IV INFUSION ADD-ON: CPT

## 2018-02-16 PROCEDURE — 84439 ASSAY OF FREE THYROXINE: CPT | Performed by: EMERGENCY MEDICINE

## 2018-02-16 RX ORDER — LEVOTHYROXINE SODIUM 100 UG/1
50 TABLET ORAL
COMMUNITY
End: 2019-03-27

## 2018-02-16 RX ORDER — LEVOTHYROXINE SODIUM 100 UG/1
100 TABLET ORAL
Status: DISCONTINUED | OUTPATIENT
Start: 2018-02-17 | End: 2018-02-17 | Stop reason: HOSPADM

## 2018-02-16 RX ORDER — ACETAMINOPHEN 500 MG
500 TABLET ORAL EVERY MORNING
Status: DISCONTINUED | OUTPATIENT
Start: 2018-02-17 | End: 2018-02-17 | Stop reason: HOSPADM

## 2018-02-16 RX ORDER — NALOXONE HYDROCHLORIDE 0.4 MG/ML
.1-.4 INJECTION, SOLUTION INTRAMUSCULAR; INTRAVENOUS; SUBCUTANEOUS
Status: DISCONTINUED | OUTPATIENT
Start: 2018-02-16 | End: 2018-02-17 | Stop reason: HOSPADM

## 2018-02-16 RX ORDER — LEVOTHYROXINE SODIUM 100 UG/1
88 TABLET ORAL
COMMUNITY

## 2018-02-16 RX ORDER — BISACODYL 10 MG
10 SUPPOSITORY, RECTAL RECTAL DAILY PRN
Status: DISCONTINUED | OUTPATIENT
Start: 2018-02-16 | End: 2018-02-17 | Stop reason: HOSPADM

## 2018-02-16 RX ORDER — PROCHLORPERAZINE MALEATE 5 MG
5 TABLET ORAL EVERY 6 HOURS PRN
Status: DISCONTINUED | OUTPATIENT
Start: 2018-02-16 | End: 2018-02-17 | Stop reason: HOSPADM

## 2018-02-16 RX ORDER — ONDANSETRON 4 MG/1
4 TABLET, ORALLY DISINTEGRATING ORAL EVERY 6 HOURS PRN
Status: DISCONTINUED | OUTPATIENT
Start: 2018-02-16 | End: 2018-02-17 | Stop reason: HOSPADM

## 2018-02-16 RX ORDER — AMOXICILLIN 250 MG
2 CAPSULE ORAL 2 TIMES DAILY PRN
Status: DISCONTINUED | OUTPATIENT
Start: 2018-02-16 | End: 2018-02-17 | Stop reason: HOSPADM

## 2018-02-16 RX ORDER — LIDOCAINE 40 MG/G
CREAM TOPICAL
Status: DISCONTINUED | OUTPATIENT
Start: 2018-02-16 | End: 2018-02-17 | Stop reason: HOSPADM

## 2018-02-16 RX ORDER — ONDANSETRON 2 MG/ML
4 INJECTION INTRAMUSCULAR; INTRAVENOUS EVERY 6 HOURS PRN
Status: DISCONTINUED | OUTPATIENT
Start: 2018-02-16 | End: 2018-02-17 | Stop reason: HOSPADM

## 2018-02-16 RX ORDER — ACETAMINOPHEN 325 MG/1
650 TABLET ORAL EVERY 4 HOURS PRN
Status: DISCONTINUED | OUTPATIENT
Start: 2018-02-16 | End: 2018-02-17 | Stop reason: HOSPADM

## 2018-02-16 RX ORDER — LEVOTHYROXINE SODIUM 50 UG/1
50 TABLET ORAL
Status: DISCONTINUED | OUTPATIENT
Start: 2018-02-23 | End: 2018-02-17 | Stop reason: HOSPADM

## 2018-02-16 RX ORDER — AMOXICILLIN 250 MG
1 CAPSULE ORAL 2 TIMES DAILY PRN
Status: DISCONTINUED | OUTPATIENT
Start: 2018-02-16 | End: 2018-02-17 | Stop reason: HOSPADM

## 2018-02-16 RX ORDER — PROCHLORPERAZINE 25 MG
12.5 SUPPOSITORY, RECTAL RECTAL EVERY 12 HOURS PRN
Status: DISCONTINUED | OUTPATIENT
Start: 2018-02-16 | End: 2018-02-17 | Stop reason: HOSPADM

## 2018-02-16 RX ADMIN — ACETAMINOPHEN 650 MG: 325 TABLET, FILM COATED ORAL at 21:58

## 2018-02-16 RX ADMIN — SODIUM CHLORIDE 500 ML: 9 INJECTION, SOLUTION INTRAVENOUS at 18:58

## 2018-02-16 RX ADMIN — APIXABAN 5 MG: 2.5 TABLET, FILM COATED ORAL at 21:58

## 2018-02-16 ASSESSMENT — ACTIVITIES OF DAILY LIVING (ADL)
AMBULATION: 0-->INDEPENDENT
COGNITION: 0 - NO COGNITION ISSUES REPORTED
RETIRED_COMMUNICATION: 0-->UNDERSTANDS/COMMUNICATES WITHOUT DIFFICULTY
TRANSFERRING: 0-->INDEPENDENT
FALL_HISTORY_WITHIN_LAST_SIX_MONTHS: NO
TOILETING: 0-->INDEPENDENT
RETIRED_EATING: 0-->INDEPENDENT
SWALLOWING: 0-->SWALLOWS FOODS/LIQUIDS WITHOUT DIFFICULTY
BATHING: 0-->INDEPENDENT
DRESS: 0-->INDEPENDENT

## 2018-02-16 ASSESSMENT — ENCOUNTER SYMPTOMS
DIZZINESS: 1
RHINORRHEA: 0
SHORTNESS OF BREATH: 0
FEVER: 0
COUGH: 0

## 2018-02-16 NOTE — IP AVS SNAPSHOT
[place] [address] [facility]    [address]    [facility] [address]    Phone:  [phone]    Fax:  [phone]                                       After Visit Summary   2/16/2018    Misael Fischer    MRN: [id number]           After Visit Summary Signature Page     I have received my discharge instructions, and my questions have been answered. I have discussed any challenges I see with this plan with the nurse or doctor.    ..........................................................................................................................................  Patient/Patient Representative Signature      ..........................................................................................................................................  Patient Representative Print Name and Relationship to Patient    ..................................................               ................................................  Date                                            Time    ..........................................................................................................................................  Reviewed by Signature/Title    ...................................................              ..............................................  Date                                                            Time

## 2018-02-16 NOTE — ED NOTES
C/O dizziness and overall weakness over past week increasing in severity. Denies CP or SOB. ABC in tact. A/OX4. Pt concerned that thyroid level may be off. Extensive cardiac hx.

## 2018-02-16 NOTE — IP AVS SNAPSHOT
MRN:[id number]                      After Visit Summary   2/16/2018    Misael Fischer    MRN: [id number]           Thank you!     Thank you for choosing [facility] for your care. Our goal is always to provide you with excellent care. Hearing back from our patients is one way we can continue to improve our services. Please take a few minutes to complete the written survey that you may receive in the mail after you visit. If you would like to speak to someone directly about your visit please contact Patient Relations at [phone]. Thank you!          Patient Information     Date Of Birth          1945        Designated Caregiver       Most Recent Value    Caregiver    Will someone help with your care after discharge? no      About your hospital stay     You were admitted on:  February 16, 2018 You last received care in the:  [place] [address] [facility]    You were discharged on:  February 17, 2018        Reason for your hospital stay       lightheadedness                  Who to Call     For medical emergencies, please call 911.  For non-urgent questions about your medical care, please call your primary care provider or clinic, [phone]          Attending Provider     Provider Specialty    Pablo Beyer MD Emergency Medicine    [facility], Alejandro Nino MD Emergency Medicine    [facility], Brigette Alcala MD Internal Medicine       Primary Care Provider Office Phone # Fax #    Kevan Bull [phone]      After Care Instructions     Activity       Your activity upon discharge: activity as tolerated            Diet       Follow this diet upon discharge: Orders Placed This Encounter      Combination Diet Low Saturated Fat Na <2400mg Diet                  Follow-up Appointments     Follow-up and recommended labs and tests        Follow up with primary care provider, Kevan Bull, within 7 days                  Pending Results     Date and Time  "Order Name Status Description    2/16/2018 2120 Echo Complete with Lumason In process     2/16/2018 1707 EKG 12 lead Preliminary             Statement of Approval     Ordered          02/17/18 1310  I have reviewed and agree with all the recommendations and orders detailed in this document.  EFFECTIVE NOW     Approved and electronically signed by:  Liliam Armendariz MD             Admission Information     Date & Time Provider Department Dept. Phone    2/16/2018 Brigette Pryor MD [place] [address] [facility] [phone]      Your Vitals Were     Blood Pressure Pulse Temperature Respirations Height Weight    116/74 (BP Location: Right arm) 54 97.3  F (36.3  C) (Oral) 18 1.727 m (5' 8\") 71.9 kg (158 lb 8 oz)    Pulse Oximetry BMI (Body Mass Index)                93% 24.1 kg/m2          MyChart Information     [employer] gives you secure access to your electronic health record. If you see a primary care provider, you can also send messages to your care team and make appointments. If you have questions, please call your primary care clinic.  If you do not have a primary care provider, please call [phone] and they will assist you.        Care EveryWhere ID     This is your Care EveryWhere ID. This could be used by other organizations to access your [facility] medical records  [id number]        Equal Access to Services     EDUARDO DELANEY : Hadii girish matos Socatherine, waaxda luqadaha, qaybta kaalmada adeyaa, lakhwinder solomon. So [facility] [phone].    ATENCIÓN: Si habla español, tiene a stewart disposición servicios gratuitos de asistencia lingüística. Llame al [phone].    We comply with applicable federal civil rights laws and [place] laws. We do not discriminate on the basis of race, color, national origin, age, disability, sex, sexual orientation, or gender identity.               Review of your medicines      CONTINUE these medicines which have NOT CHANGED        " Dose / Directions    apixaban ANTICOAGULANT 5 MG tablet   Commonly known as:  ELIQUIS   Used for:  Persistent atrial fibrillation (H)        Dose:  5 mg   Take 1 tablet (5 mg) by mouth 2 times daily   Quantity:  180 tablet   Refills:  3       * levothyroxine 100 MCG tablet   Commonly known as:  SYNTHROID/LEVOTHROID        Dose:  100 mcg   Take 100 mcg by mouth On Mon, Tue, Wed, Thur, Sat, Sun   Refills:  0       * levothyroxine 100 MCG tablet   Commonly known as:  SYNTHROID/LEVOTHROID        Dose:  50 mcg   Take 50 mcg by mouth Once a week on Fri   Refills:  0       torsemide 5 MG tablet   Commonly known as:  DEMADEX   Used for:  Non-rheumatic tricuspid valve insufficiency        Dose:  5 mg   Take 1 tablet (5 mg) by mouth daily Down to 5mg daily   Quantity:  90 tablet   Refills:  1       TYLENOL PO        Dose:  500 mg   Take 500 mg by mouth every morning   Refills:  0       VITAMIN D (CHOLECALCIFEROL) PO        Dose:  1000 Units   Take 1,000 Units by mouth daily   Refills:  0       * Notice:  This list has 2 medication(s) that are the same as other medications prescribed for you. Read the directions carefully, and ask your doctor or other care provider to review them with you.             Protect others around you: Learn how to safely use, store and throw away your medicines at www.disposemymeds.org.             Medication List: This is a list of all your medications and when to take them. Check marks below indicate your daily home schedule. Keep this list as a reference.      Medications           Morning Afternoon Evening Bedtime As Needed    apixaban ANTICOAGULANT 5 MG tablet   Commonly known as:  ELIQUIS   Take 1 tablet (5 mg) by mouth 2 times daily   Last time this was given:  5 mg on 2/17/2018  8:44 AM   Next Dose Due:  2-17-18                                      * levothyroxine 100 MCG tablet   Commonly known as:  SYNTHROID/LEVOTHROID   Take 100 mcg by mouth On Mon, Tue, Wed, Thur, Sat, Sun   Last time  this was given:  100 mcg on 2/17/2018  8:44 AM   Next Dose Due:  2-18-18                                   * levothyroxine 100 MCG tablet   Commonly known as:  SYNTHROID/LEVOTHROID   Take 50 mcg by mouth Once a week on Fri   Last time this was given:  100 mcg on 2/17/2018  8:44 AM   Next Dose Due:  2-23-18                                   torsemide 5 MG tablet   Commonly known as:  DEMADEX   Take 1 tablet (5 mg) by mouth daily Down to 5mg daily   Last time this was given:  5 mg on 2/17/2018 11:27 AM   Next Dose Due:  2-18-18                                   TYLENOL PO   Take 500 mg by mouth every morning   Last time this was given:  500 mg on 2/17/2018  8:44 AM   Next Dose Due:  2-18-18                                   VITAMIN D (CHOLECALCIFEROL) PO   Take 1,000 Units by mouth daily   Next Dose Due:  2-18-18                                * Notice:  This list has 2 medication(s) that are the same as other medications prescribed for you. Read the directions carefully, and ask your doctor or other care provider to review them with you.

## 2018-02-17 ENCOUNTER — APPOINTMENT (OUTPATIENT)
Dept: CARDIOLOGY | Facility: CLINIC | Age: 73
DRG: 683 | End: 2018-02-17
Attending: HOSPITALIST
Payer: MEDICARE

## 2018-02-17 VITALS
HEART RATE: 54 BPM | OXYGEN SATURATION: 93 % | HEIGHT: 68 IN | RESPIRATION RATE: 18 BRPM | SYSTOLIC BLOOD PRESSURE: 116 MMHG | DIASTOLIC BLOOD PRESSURE: 74 MMHG | BODY MASS INDEX: 24.02 KG/M2 | WEIGHT: 158.5 LBS | TEMPERATURE: 97.3 F

## 2018-02-17 LAB
ALBUMIN SERPL-MCNC: 3.3 G/DL (ref 3.4–5)
ALP SERPL-CCNC: 88 U/L (ref 40–150)
ALT SERPL W P-5'-P-CCNC: 27 U/L (ref 0–70)
ANION GAP SERPL CALCULATED.3IONS-SCNC: 10 MMOL/L (ref 3–14)
AST SERPL W P-5'-P-CCNC: 33 U/L (ref 0–45)
BASOPHILS # BLD AUTO: 0 10E9/L (ref 0–0.2)
BASOPHILS NFR BLD AUTO: 0.9 %
BILIRUB DIRECT SERPL-MCNC: 0.4 MG/DL (ref 0–0.2)
BILIRUB SERPL-MCNC: 1.1 MG/DL (ref 0.2–1.3)
BUN SERPL-MCNC: 63 MG/DL (ref 7–30)
CALCIUM SERPL-MCNC: 8.3 MG/DL (ref 8.5–10.1)
CHLORIDE SERPL-SCNC: 100 MMOL/L (ref 94–109)
CO2 SERPL-SCNC: 22 MMOL/L (ref 20–32)
CREAT SERPL-MCNC: 3.37 MG/DL (ref 0.66–1.25)
DIFFERENTIAL METHOD BLD: ABNORMAL
EOSINOPHIL # BLD AUTO: 0.1 10E9/L (ref 0–0.7)
EOSINOPHIL NFR BLD AUTO: 1.1 %
ERYTHROCYTE [DISTWIDTH] IN BLOOD BY AUTOMATED COUNT: 14.6 % (ref 10–15)
GFR SERPL CREATININE-BSD FRML MDRD: 18 ML/MIN/1.7M2
GLUCOSE SERPL-MCNC: 73 MG/DL (ref 70–99)
HCT VFR BLD AUTO: 43.2 % (ref 40–53)
HGB BLD-MCNC: 14.6 G/DL (ref 13.3–17.7)
IMM GRANULOCYTES # BLD: 0 10E9/L (ref 0–0.4)
IMM GRANULOCYTES NFR BLD: 0.2 %
INTERPRETATION ECG - MUSE: NORMAL
LYMPHOCYTES # BLD AUTO: 1.1 10E9/L (ref 0.8–5.3)
LYMPHOCYTES NFR BLD AUTO: 24.4 %
MCH RBC QN AUTO: 32.9 PG (ref 26.5–33)
MCHC RBC AUTO-ENTMCNC: 33.8 G/DL (ref 31.5–36.5)
MCV RBC AUTO: 97 FL (ref 78–100)
MONOCYTES # BLD AUTO: 0.4 10E9/L (ref 0–1.3)
MONOCYTES NFR BLD AUTO: 10.1 %
NEUTROPHILS # BLD AUTO: 2.8 10E9/L (ref 1.6–8.3)
NEUTROPHILS NFR BLD AUTO: 63.3 %
NRBC # BLD AUTO: 0 10*3/UL
NRBC BLD AUTO-RTO: 0 /100
PLATELET # BLD AUTO: 123 10E9/L (ref 150–450)
POTASSIUM SERPL-SCNC: 4.8 MMOL/L (ref 3.4–5.3)
PROT SERPL-MCNC: 6.3 G/DL (ref 6.8–8.8)
RBC # BLD AUTO: 4.44 10E12/L (ref 4.4–5.9)
SODIUM SERPL-SCNC: 132 MMOL/L (ref 133–144)
WBC # BLD AUTO: 4.4 10E9/L (ref 4–11)

## 2018-02-17 PROCEDURE — 25500064 ZZH RX 255 OP 636: Performed by: HOSPITALIST

## 2018-02-17 PROCEDURE — 36415 COLL VENOUS BLD VENIPUNCTURE: CPT | Performed by: HOSPITALIST

## 2018-02-17 PROCEDURE — A9270 NON-COVERED ITEM OR SERVICE: HCPCS | Mod: GY | Performed by: HOSPITALIST

## 2018-02-17 PROCEDURE — 80076 HEPATIC FUNCTION PANEL: CPT | Performed by: HOSPITALIST

## 2018-02-17 PROCEDURE — 25000132 ZZH RX MED GY IP 250 OP 250 PS 637: Mod: GY | Performed by: HOSPITALIST

## 2018-02-17 PROCEDURE — 25000132 ZZH RX MED GY IP 250 OP 250 PS 637: Mod: GY | Performed by: INTERNAL MEDICINE

## 2018-02-17 PROCEDURE — 93306 TTE W/DOPPLER COMPLETE: CPT | Mod: 26 | Performed by: INTERNAL MEDICINE

## 2018-02-17 PROCEDURE — 80048 BASIC METABOLIC PNL TOTAL CA: CPT | Performed by: HOSPITALIST

## 2018-02-17 PROCEDURE — 85025 COMPLETE CBC W/AUTO DIFF WBC: CPT | Performed by: HOSPITALIST

## 2018-02-17 PROCEDURE — 99222 1ST HOSP IP/OBS MODERATE 55: CPT | Performed by: INTERNAL MEDICINE

## 2018-02-17 PROCEDURE — 99239 HOSP IP/OBS DSCHRG MGMT >30: CPT | Performed by: INTERNAL MEDICINE

## 2018-02-17 PROCEDURE — A9270 NON-COVERED ITEM OR SERVICE: HCPCS | Mod: GY | Performed by: INTERNAL MEDICINE

## 2018-02-17 RX ORDER — TORSEMIDE 5 MG/1
5 TABLET ORAL ONCE
Status: COMPLETED | OUTPATIENT
Start: 2018-02-17 | End: 2018-02-17

## 2018-02-17 RX ADMIN — APIXABAN 5 MG: 2.5 TABLET, FILM COATED ORAL at 08:44

## 2018-02-17 RX ADMIN — LEVOTHYROXINE SODIUM 100 MCG: 100 TABLET ORAL at 08:44

## 2018-02-17 RX ADMIN — ACETAMINOPHEN 500 MG: 500 TABLET, FILM COATED ORAL at 08:44

## 2018-02-17 RX ADMIN — SULFUR HEXAFLUORIDE 3 ML: KIT at 08:38

## 2018-02-17 RX ADMIN — TORSEMIDE 5 MG: 5 TABLET ORAL at 11:27

## 2018-02-17 ASSESSMENT — ACTIVITIES OF DAILY LIVING (ADL)
ADLS_ACUITY_SCORE: 9

## 2018-02-17 NOTE — PLAN OF CARE
No CP ,SOB or dizziness. Discharge instructions reviewed with patient who verbalized understanding.

## 2018-02-17 NOTE — PHARMACY-ADMISSION MEDICATION HISTORY
Admission medication history interview status for this patient is complete. See [facility] admission navigator for allergy information, prior to admission medications and immunization status.     Medication history interview source(s):Patient and Family  Medication history resources (including written lists, pill bottles, clinic record):None  Primary pharmacy:[facility] - [facility]    Changes made to PTA medication list:  Added: tylenol  Deleted: -  Changed: vit d to 1000 units qd, levothyroxine to 50 mcg on Fr and 100 mcg ROW    Actions taken by pharmacist (provider contacted, etc):None     Additional medication history information:None    Medication reconciliation/reorder completed by provider prior to medication history? No    Do you take OTC medications (eg tylenol, ibuprofen, fish oil, eye/ear drops, etc)? yes(Y/N)    For patients on insulin therapy: no (Y/N)  Lantus/levemir/NPH/Mix 70/30 dose:   (Y/N) (see Med list for doses)   Sliding scale Novolog Y/N  If Yes, do you have a baseline novolog pre-meal dose:  units with meals  Patients eat three meals a day:   Y/N    How many episodes of hypoglycemia do you have per week: _______  How many missed doses do you have per week: ______  How many times do you check your blood glucose per day: _______   Any Barriers to therapy - Be specific :  cost of medications, comfortable with giving injections (if applicable), comfortable and confident with current diabetes regimen: Y/N ______________      Prior to Admission medications    Medication Sig Last Dose Taking? Auth Provider   levothyroxine (SYNTHROID/LEVOTHROID) 100 MCG tablet Take 100 mcg by mouth On Mon, Tue, Wed, Thur, Sat, Sun 2/15/2018 at Unknown time Yes Unknown, Entered By History   levothyroxine (SYNTHROID/LEVOTHROID) 100 MCG tablet Take 50 mcg by mouth Once a week on Fri 2/16/2018 at Unknown time Yes Unknown, Entered By History   Acetaminophen (TYLENOL PO) Take 500 mg by mouth every morning 2/16/2018 at Unknown time Yes  Unknown, Entered By History   VITAMIN D, CHOLECALCIFEROL, PO Take 1,000 Units by mouth daily  2/16/2018 at Unknown time Yes Reported, Patient   torsemide (DEMADEX) 5 MG tablet Take 1 tablet (5 mg) by mouth daily Down to 5mg daily 2/16/2018 at Unknown time Yes Prabhakar Castro MD   apixaban ANTICOAGULANT (ELIQUIS) 5 MG tablet Take 1 tablet (5 mg) by mouth 2 times daily 2/16/2018 at x1 Yes Stephanie Jaimes PA-C

## 2018-02-17 NOTE — ED PROVIDER NOTES
"  History     Chief Complaint:  Dizziness      The history is provided by the patient.      Misael Fischer is a 72 year old male with a complex cardiac history including atrial flutter and fibrillation, CAD, CHF, and hypercholesterolemia currently anti-coagulated on Eliquis who presents to the ED for evaluation of dizziness. The patient had a recent cath in 12/2017 which was negative. A few days ago, the patient increased his intake of torsemide from 5 mg to 10 and 15 mg as he thought he was gaining weight. Yesterday, the patient had an episode of dizziness while walking around a mall, which alleviated with rest. Today, the patient had multiple episodes of similar dizziness. He decided to come in for evaluation.    Here in the ED, the patient reports having bilateral lower extremity edema \"at the end of the day\", but denies chest pain, shortness of breath, cough, fever, or rhinorrhea. Of note, the patient is scheduled to travel out of the country on 02[place]18.        Allergies:  Ace Inhibitors    Medications:    Levothyroxine  Eliquis  Demadex    Past Medical History:    Atrial flutter (H)   AVNRT (AV jade re-entry tachycardia) (H)   BPH (benign prostatic hyperplasia)   CAD (coronary artery disease)   Cardiomyopathy (H)   CHF (congestive heart failure) (H)   CKD (chronic kidney disease)   Elevated PSA   Hypercholesterolemia   Malaria   Mild pulmonic regurgitation and RV dysfunction by prior echocardiogram   Non-rheumatic tricuspid valve insufficiency   Paroxysmal a-fib (H)   Persistent atrial fibrillation with rapid ventricular response (H)   Pigment deposition   Systolic heart failure (H)   Tricuspid regurgitation     Past Surgical History:    Anesthesia cardioversion (2016)  C yair (transesophageal echo)  Cardioversion (2015)   Ablation atrial flutter (2016)  Foot injury repair    Family History:    Mother: CAD  Father: prostate cancer     Social History:  Presents with his wife.   Negative for tobacco use.  Negative " for smokeless tobacco  Positive for alcohol use.   Marital Status:   [2]     Review of Systems   Constitutional: Negative for fever.   HENT: Negative for rhinorrhea.    Respiratory: Negative for cough and shortness of breath.    Cardiovascular: Positive for leg swelling (bilateral). Negative for chest pain.   Neurological: Positive for dizziness.   All other systems reviewed and are negative.    Physical Exam   First Vitals:  Patient Vitals for the past 24 hrs:   BP Heart Rate Resp SpO2   02/16/18 1845 (!) 134/96 53 16 92 %   02/16/18 1830 118/82 48 16 92 %   02/16/18 1816 - 48 16 92 %   02/16/18 1815 118/76 - - -   02/16/18 1718 (!) 137/103 - - -   02/16/18 1715 - 56 18 90 %   02/16/18 1712 - - 18 -     Physical Exam  Nursing note and vitals reviewed.  Constitutional: Cooperative.   HENT:   Mouth/Throat: Moist mucous membranes.   Eyes: EOMI, nonicteric sclera  Cardiovascular: bradycardic, regular rhythm, no murmurs, rubs, or gallops  Pulmonary/Chest: Effort normal and breath sounds normal. No respiratory distress. No wheezes. No rales.   Abdominal: Soft. Nontender, nondistended, no guarding or rigidity. BS present.   Musculoskeletal: Normal range of motion.   Neurological: Alert. Moves all extremities spontaneously.   Skin: Skin is warm and dry. No rash noted.   Psychiatric: Normal mood and affect.       Emergency Department Course   Laboratory:  1727 Troponin: 0.111 (H)    CBC: WBC: 5.9, HGB: 16.4, PLT: 145 (L)  BMP: Glucose 87,  (L), BUN 57 (H), Creatinine 3.55 (H), GFR 17 (L), o/w WNL    TSH with free T4: 5.10 (H)  T4 free: 1.55 (H)    Interventions:  1858 NS 0.5L IV    Emergency Department Course:  Nursing notes and vitals reviewed. 1842 I performed an exam of the patient as documented above.     IV inserted. Medicine administered as documented above. Blood drawn. This was sent to the lab for further testing, results above.    1929  I consulted with Dr. Pryor of the hospitalist services. They are  in agreement to accept the patient for admission.    Findings and plan explained to the Patient who consents to admission. Discussed the patient with Dr. Pryor, who will admit the patient to a inpatient bed for further monitoring, evaluation, and treatment.    Impression & Plan    Medical Decision Making:  Misael Fischer is a 72 year old male who presents with chief complaint of dizziness. Patient noted that he had taken extra doses of his diuretics in the days preceding presentation. Patient's vital signs are normal apart from a mild sinus bradycardia. Labs are notable for an acute kidney injury, hyponatremia, and an elevated troponin. The acute kidney injury and hyponatremia are likely due to his increased diuretic use and dehydration. The elevated troponin is more likely a chronic finding for the patient, given his CKD and cardiomyopathy. He denies any chest pain or shortness of breath. He had a recent cath which showed only trivial CAD. I think this is unlikely to represent ACS and would not do any further treatment at this time apart from trending his troponin. Discussed with the patient the need for admission for slow rehydration. He and his wife are in agreement with this plan. Dr. Pryor of the hospitalist service accepts.      Diagnosis:    ICD-10-CM   1. Acute kidney injury (H) N17.9   2. Hyponatremia E87.1   3. Elevated troponin R74.8   4. Near syncope R55       Disposition:  Admitted to Dr. Pryor of the hospitalist service.     I, Mariola Grover, am serving as a scribe on 2/16/2018 at 6:42 PM to personally document services performed by Alejandro Mosqueda MD based on my observations and the provider's statements to me.     Mariola Grover  2/16/2018   [facility] EMERGENCY DEPARTMENT       Alejandro Mosqueda MD  02/17/18 0518

## 2018-02-17 NOTE — PLAN OF CARE
Problem: Patient Care Overview  Goal: Plan of Care/Patient Progress Review  Outcome: No Change  VS stable. Diminished LS. Complained of slight headache that resolved with tylenol. Tele is sinus portillo with PVC's. No complaints of dizziness. Slept well throughout the night.

## 2018-02-17 NOTE — H&P
[facility]  Hospitalist Admission Note  Name: Misael Fischer    MRN: [id number]  YOB: 1945    Age: 72 year old  Date of admission: 2/16/2018              Brief patient summary: Pt is a 72 yr old male with atrial flutter, status post flutter ablation, status post AVNRT ablation, tachycardia-induced cardiomyopathy with eventual improvement in his ejection fraction, RV dysfunction of unknown etiology which has been evaluated at the [facility], severe tricuspid regurgitation related to RV annular dilatation, along with chronic kidney disease stage 3-4 who presented on 2/16/2018 with 1 day h/o dizziness with standing. He was found to have NAE         Assessment and Plan:   Dizziness: symptoms are orthostatic in nature. He developed symptoms after doubling up the dose of his torsemide from 5->10 mg when he noted weight gain in the past 4 days. After taking the increased dose his weight dropped by 4 lbs in 24 hrs (despite not noticing any increased UOP) and he also developed dizziness with standing.   --given 500 cc bolus in the ER, hold further fluids given his cardiac hx and poor LVEF  --repeat TTE in the AM to r/o further worsening  --cardiac monitoring to r/o arrythmias causing dizziness  --cardiology consult for medication adjustment if needed (pt wants to fly to [place] on Sunday and would need diuretic plans laid out)  --hold further diuresis for now until symptoms improve     NAE on CKD stage 3-4: Cr upto 3.55 from baseline 1.7-2.2. He received 500 cc of IVF in the ER. Would hold off further fluids and hold diuretics to see if his weight improves. He does not appear volume overloaded on exam. TTE would be helpful to determine his intravascular volume status as well. Pt was recommended to establish care with nephrology as outpatient given his CKD stage 3-4 at baseline.      Elevated trop: he recently had coronary angiogram with no significant CAD. He has no cardiac symptoms currently.  Recheck trop in the AM   --TTE    Dilated CMP with EF 30-35%: previously thought to be tachycardia mediated but has not improved despite rate control. Recent coronary angiogram in Nov 2017 was -ve for ischemic heart disease.     H/o A fib, flutter s/p ablation: he is on apixiban. Will cont. Not on any BB due to intolerance (dizziness, fatigue). He is slightly bradycardic which is his normal HR per pt  --he was previously on amiodarone but taken off of it in Nov due to interaction with levothyroxine   --cont cardiac monitoring     Hypothyroidism: the patient developed severe hypothyroidism while on amiodarone, for which amiodarone was ultimately stopped. His TSH has been improving from 56->5 now.  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 Pt states 2 days ago his weight increased from baseline 152->158, felt abd distention and LE edema- so he doubled up his torsemide from 5->10 mg as recommended by his cardiologist. He did not really notice any increase in UOP however weight decreased from 158->154 the next day and he started to feel dizzy every time he would stand up from a sitting position. He thought he may have developed thyroid problem so he came in to check it out.   He denies any palpitations, chest pain or syncope.            Past Medical History:     Patient Active Problem List   Diagnosis     Pigment deposition     Elevated PSA     BPH (benign prostatic hyperplasia)     Chronic kidney disease     Health Care Home     Hypercholesterolemia     Family history of prostate cancer     HTN (hypertension)     CAD (coronary artery disease)     CHF  (congestive heart failure) (H)     Cardiomyopathy (H)     Atrial flutter (H)     Tricuspid regurgitation      Past Medical History:   Diagnosis Date     Atrial flutter (H) 11/23/15     AVNRT (AV jade re-entry tachycardia) (H)     status post     BPH (benign prostatic hyperplasia)      CAD (coronary artery disease)     Abnormal Nuc 2014     Cardiomyopathy (H)     Echo 11/2015- EF 35-40%     CHF (congestive heart failure) (H) 11/25/2015    Echo 11/2015- EF 35-40%      CKD (chronic kidney disease)     not on ACE/ARB due to insufficiency     Elevated PSA      Hypercholesterolemia 7/10/2014     Malaria      Mild pulmonic regurgitation and RV dysfunction by prior echocardiogram      Non-rheumatic tricuspid valve insufficiency      Paroxysmal a-fib (H)      Persistent atrial fibrillation with rapid ventricular response (H)      Pigment deposition      Systolic heart failure (H)     Echo 11/2015- EF 35-40%     Tricuspid regurgitation     Mod 2016                Past Surgical History:     Past Surgical History:   Procedure Laterality Date     ANESTHESIA CARDIOVERSION N/A 12/6/2016    Procedure: ANESTHESIA CARDIOVERSION;  Surgeon: GENERIC ANESTHESIA PROVIDER;  Location:  OR     C KP (TRANSESOPHAGEAL ECHO)  12/6/16     CARDIOVERSION  12/14/15     H ABLATION ATRIAL FLUTTER  3/21/16     ORTHOPEDIC SURGERY Left 1957    foot injury repair               Home Medications:     Prior to Admission medications    Medication Sig Last Dose Taking? Auth Provider   Levothyroxine Sodium 100 MCG CAPS Take 100 mg by mouth daily 2/16/2018 at Unknown time Yes Reported, Patient   VITAMIN D, CHOLECALCIFEROL, PO Take 2,000 Units by mouth daily 2/16/2018 at Unknown time Yes Reported, Patient   torsemide (DEMADEX) 5 MG tablet Take 1 tablet (5 mg) by mouth daily Down to 5mg daily 2/16/2018 at Unknown time Yes Prabhakar Castro MD   apixaban ANTICOAGULANT (ELIQUIS) 5 MG tablet Take 1 tablet (5 mg) by mouth 2 times daily 2/16/2018 at Unknown time Yes  Stephanie Jaimes PA-C            Allergies:     Allergies   Allergen Reactions     Ace Inhibitors      Due to CKD, renal insufficiency            Social History:     Social History     Social History     Marital status:      Spouse name: N/A     Number of children: N/A     Years of education: N/A     Occupational History     Not on file.     Social History Main Topics     Smoking status: Never Smoker     Smokeless tobacco: Never Used     Alcohol use Yes      Comment: rare     Drug use: No     Sexual activity: Not Currently     Other Topics Concern     Caffeine Concern No     Occupational Exposure No     none     Sleep Concern No     Stress Concern No     Weight Concern No     Special Diet Yes     low salt     Exercise Yes     walking     Seat Belt Yes     Social History Narrative                  Family History:     Family History   Problem Relation Age of Onset     C.A.D. Mother      Prostate Cancer Father 73                   Review of Systems:   The 10 point Review of Systems is negative other than noted in the HPI.              Physical Exam:     Heart Rate: 53, Blood pressure (!) 134/96, resp. rate 16, SpO2 92 %.  0 lbs 0 oz     General: Alert, awake, no acute distress.  HEENT: NC/AT, eyes anicteric, external occular movements intact, face symmetric.  Dentition WNL, MM moist.  Cardiac: RRR, S1, S2.  No murmurs appreciated.  Pulmonary: Normal chest rise, normal work of breathing.  Lungs CTA BL  Abdomen: soft, non-tender, non-distended.  Bowel Sounds Present.  No guarding.  Extremities: no deformities.  Warm, well perfused.  Skin: no rashes or lesions noted.  Warm and Dry.  Neuro: No focal deficits noted.  Speech clear.  Coordination and strength grossly normal.  Psych: Appropriate affect.         Data:   All new lab and imaging data was reviewed.    Recent Labs  Lab 02/16/18  1727   WBC 5.9   HGB 16.4   HCT 48.7   MCV 98   *       Recent Labs  Lab 02/16/18  1727   *   POTASSIUM 5.3    CHLORIDE 95   CO2 24   ANIONGAP 10   GLC 87   BUN 57*   CR 3.55*   GFRESTIMATED 17*   GFRESTBLACK 20*   TAYLOR 9.2       EKst degree AVB  Imaging:  Results for orders placed or performed during the hospital encounter of 16   US Renal Complete    Narrative    ULTRASOUND RETROPERITONEAL COMPLETE  3/17/2016 10:07 AM     HISTORY: Chronic kidney disease.    COMPARISON: None.    FINDINGS: The bilateral renal parenchyma are normal in echogenicity  without evidence for shadowing stone or mass. No renal cysts. No  hydronephrosis. Right kidney measures 9.0 x 5.7 x 4.5 cm and the left  measures 8.9 x 5.2 x 4.6 cm. Cortical thickness is 2.0 cm on the right  and 1.6 cm on the left. Prostatomegaly measuring 5.7 x 5.0 x 5.0 cm.  Bladder is unremarkable given its level of distention.      Impression    IMPRESSION: No obstruction demonstrated.    MD Brigette DE LUNA MD  Hospitalist  [facility] [address] [place] [address] [phone]

## 2018-02-17 NOTE — ED NOTES
[facility]  ED Nurse Handoff Report    Misael Fischer is a 72 year old male   ED Chief complaint: Dizziness  . ED Diagnosis:   Final diagnoses:   Acute kidney injury (H)   Hyponatremia   Elevated troponin   Near syncope     Allergies:   Allergies   Allergen Reactions     Ace Inhibitors      Due to CKD, renal insufficiency       Code Status: Full Code  Activity level - Baseline/Home:  Independent. Activity Level - Current:   Stand with Assist. Lift room needed: No. Bariatric: No   Needed: No   Isolation: No. Infection: Not Applicable.     Vital Signs:   Vitals:    02/16/18 1815 02/16/18 1816 02/16/18 1830 02/16/18 1845   BP: 118/76  118/82 (!) 134/96   Resp:  16 16 16   SpO2:  92% 92% 92%       Cardiac Rhythm:  ,      Pain level:    Patient confused: No. Patient Falls Risk: Yes.   Elimination Status: Has voided   Patient Report - Initial Complaint: dizziness and lightheadedness with activity for past couple days. Focused Assessment: patient reports dizziness with activity that has gotten worse today. Denies shortness of breath or chest pain. Heart rate normally in 70s but noted to be in 50s upon arrival. Denies fever/chills.   Tests Performed: orthostatics, IV fluids. Abnormal Results:   Labs Ordered and Resulted from Time of ED Arrival Up to the Time of Departure from the ED   CBC WITH PLATELETS DIFFERENTIAL - Abnormal; Notable for the following:        Result Value    MCH 33.1 (*)     Platelet Count 145 (*)     All other components within normal limits   BASIC METABOLIC PANEL - Abnormal; Notable for the following:     Sodium 129 (*)     Urea Nitrogen 57 (*)     Creatinine 3.55 (*)     GFR Estimate 17 (*)     GFR Estimate If Black 20 (*)     All other components within normal limits   TROPONIN I - Abnormal; Notable for the following:     Troponin I ES 0.111 (*)     All other components within normal limits   TSH WITH FREE T4 REFLEX - Abnormal; Notable for the following:     TSH 5.10 (*)      All other components within normal limits   T4 FREE - Abnormal; Notable for the following:     T4 Free 1.55 (*)     All other components within normal limits   CARDIAC CONTINUOUS MONITORING   PULSE OXIMETRY NURSING   PERIPHERAL IV CATHETER   ORTHOSTATIC BLOOD PRESSURE AND PULSE     Treatments provided: tele monitor, IV fluids  Family Comments: wife supportive at bedside  OBS brochure/video discussed/provided to patient:  N/A  ED Medications:   Medications   lidocaine 1 % 1 mL (not administered)   lidocaine (LMX4) kit (not administered)   sodium chloride (PF) 0.9% PF flush 3 mL (not administered)   sodium chloride (PF) 0.9% PF flush 3 mL (not administered)   0.9% sodium chloride BOLUS (500 mLs Intravenous New Bag 2/16/18 8251)     Drips infusing:  No  For the majority of the shift, the patient's behavior Green. Interventions performed were NA.     Severe Sepsis OR Septic Shock Diagnosis Present: No      ED Nurse Name/Phone Number: Tea Cueva,   8:09 PM    RECEIVING UNIT ED HANDOFF REVIEW    Above ED Nurse Handoff Report was reviewed: Yes  Reviewed by: Garima Sorenson on February 16, 2018 at 8:40 PM

## 2018-02-17 NOTE — CONSULTS
[facility]    CARDIOLOGY CONSULT    Date of Admission:  2/16/2018  Date of Consult: February 17, 2018    ASSESSMENT:  72-year-old male with a complicated cardiac history as outlined below admitted with dizziness.  In the past few days he was 8 pounds above his baseline weight.  He likely had volume retention.  He does not appear dehydrated.  He had no tachycardia or hypotension.  Today he feels better, he still is a few pounds above his baseline weight.  He can get his oral torsemide today.    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  He was told that if he goes, he needs to monitor his weight very closely and continue to take torsemide with extra doses if he has any weight gain.  He will be around physicians and can have his creatinine checked while he is there.  He will need to follow-up in clinic as soon as he gets back after his 2 week trip.    RECOMMENDATIONS:  1.  Nonischemic cardiomyopathy  -Mild fluid retention, torsemide 5 mg oral today  -Continue torsemide dose at home with goal weight of 150  -No other changes to his medication, otherwise will follow-up as planned in the cardiology clinic    2.  Tricuspid regurgitation with RV dysfunction  -Stable to slightly improved on echo today    3.  History of paroxysmal atrial fibrillation  -No arrhythmias on telemetry    4.  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  Acute kidney injury on chronic kidney disease  -Patient was instructed to have his creatinine checked within the next week while in [place] and then when he returns after his trip    He should be okay to discharge from a cardiac perspective today.    Reji Henning MD  Cardiology - [facility]  Pager:  [phone]  Text Page  February 17, 2018    CODE STATUS:  Full Code    REASON FOR CONSULT: NICM, valve disease, dizziness    PRIMARY CARE PHYSICIAN:  Kevan  Lamberto Jorgito    HISTORY OF PRESENT ILLNESS:  72 year old male with a history of nonischemic cardiomyopathy, previous flutter status post ablation, paroxysmal A. fib on amiodarone that was subsequently discontinued because of hypothyroidism, and RV dysfunction with tricuspid regurgitation admitted with dizziness.    He developed significant hypothyroidism on amiodarone and was recently started on Synthroid.  Metoprolol was also stopped, presumably due to bradycardia.  Echo 11/2017 showed EF 30-35%, moderate-severe RVE with mild-moderately reduced function, 2+ MR, 3-4+ TR, dilated IVC. Coronary angiogram 11/2017 showed mild CAD, RHC showed RA 18, PA 28/16, PCWP 20, small right to left shunt with Qp/Qs 0.91, Kirill CO 1.9, CI 1.0.    He has been seen at the [facility] in the heart failure department.  Most recently he has been on torsemide 5 mg daily.  There were tentative plans to repeat echo and right heart cath in early 2018.    His baseline weight runs around 150 pounds.  In the last week he noted some abdominal distention and some mild edema of his thighs.  His weight peaked at 158 pounds a few days ago.  He therefore took a few extra doses of torsemide.  Yesterday he was walking in the store and felt dizzy while walking, but denies any fall or syncope.  He checks his blood pressure at home and it runs in the 120s, he denies any recent hypotension.  His appetite has been decent.  He has no chest pain, palpitations, or shortness of breath.    He has been hemodynamically stable this admission.  Notable finding his creatinine of 3.5, which is up from his baseline between 1.72.0.  This morning he feels good with no dizziness when walking in the room with up to the bathroom.    PAST MEDICAL HISTORY:  I have reviewed this patient's medical history and updated it with pertinent information if needed.   Past Medical History:   Diagnosis Date     Atrial flutter (H) 11/23/15     AVNRT (AV jade re-entry tachycardia)  (H)     status post     BPH (benign prostatic hyperplasia)      CAD (coronary artery disease)     Abnormal Nuc 2014     Cardiomyopathy (H)     Echo 11/2015- EF 35-40%     CHF (congestive heart failure) (H) 11/25/2015    Echo 11/2015- EF 35-40%      CKD (chronic kidney disease)     not on ACE/ARB due to insufficiency     Elevated PSA      Hypercholesterolemia 7/10/2014     Malaria      Mild pulmonic regurgitation and RV dysfunction by prior echocardiogram      Non-rheumatic tricuspid valve insufficiency      Paroxysmal a-fib (H)      Persistent atrial fibrillation with rapid ventricular response (H)      Pigment deposition      Systolic heart failure (H)     Echo 11/2015- EF 35-40%     Tricuspid regurgitation     Mod 2016       PAST SURGICAL HISTORY:  I have reviewed this patient's surgical history and updated it with pertinent information if needed.  Past Surgical History:   Procedure Laterality Date     ANESTHESIA CARDIOVERSION N/A 12/6/2016    Procedure: ANESTHESIA CARDIOVERSION;  Surgeon: GENERIC ANESTHESIA PROVIDER;  Location:  OR     C KP (TRANSESOPHAGEAL ECHO)  12/6/16     CARDIOVERSION  12/14/15     H ABLATION ATRIAL FLUTTER  3/21/16     ORTHOPEDIC SURGERY Left 1957    foot injury repair       HOME MEDICATIONS:  Prior to Admission Medications   Prescriptions Last Dose Informant Patient Reported? Taking?   Acetaminophen (TYLENOL PO) 2/16/2018 at Unknown time  Yes Yes   Sig: Take 500 mg by mouth every morning   VITAMIN D, CHOLECALCIFEROL, PO 2/16/2018 at Unknown time  Yes Yes   Sig: Take 1,000 Units by mouth daily    apixaban ANTICOAGULANT (ELIQUIS) 5 MG tablet 2/16/2018 at x1  No Yes   Sig: Take 1 tablet (5 mg) by mouth 2 times daily   levothyroxine (SYNTHROID/LEVOTHROID) 100 MCG tablet 2/15/2018 at Unknown time  Yes Yes   Sig: Take 100 mcg by mouth On Mon, Tue, Wed, Thur, Sat, Sun   levothyroxine (SYNTHROID/LEVOTHROID) 100 MCG tablet 2/16/2018 at Unknown time  Yes Yes   Sig: Take 50 mcg by mouth Once a  week on Fri   torsemide (DEMADEX) 5 MG tablet 2/16/2018 at Unknown time  No Yes   Sig: Take 1 tablet (5 mg) by mouth daily Down to 5mg daily      Facility-Administered Medications: None       ALLERGIES:  Allergies   Allergen Reactions     Ace Inhibitors      Due to CKD, renal insufficiency       SOCIAL HISTORY:  I have reviewed this patient's social history and updated it with pertinent information if needed. Misael Fischer  reports that he has never smoked. He has never used smokeless tobacco. He reports that he drinks alcohol. He reports that he does not use illicit drugs.    FAMILY HISTORY:  I have reviewed this patient's family history and updated it with pertinent information if needed.   Family History   Problem Relation Age of Onset     C.A.D. Mother      Prostate Cancer Father 73       REVIEW OF SYSTEMS:  Constitutional:  No weight loss, fever, chills, weakness or fatigue.  HEENT:  Eyes:  No visual loss, blurred vision, double vision or yellow sclerae. No hearing loss, sneezing, congestion, runny nose or sore throat.  Skin:  No rash or itching.  Cardiovascular: per HPI  Respiratory: per HPI  GI:  No anorexia, nausea, vomiting or diarrhea. No abdominal pain or blood.  :  No dysurea, hematuria  Neurologic:  No headache, dizziness, syncope, paralysis, ataxia, numbness or tingling in the extremities. No change in bowel or bladder control.  Musculoskeletal:  No muscle, back pain, joint pain or stiffness.  Hematologic:  No anemia, bleeding or bruising.  Lymphatics:  No enlarged nodes. No history of splenectomy.  Psychiatric:  No history of depression or anxiety.  Endocrine:  No reports of sweating, cold or heat intolerance. No polyuria or polydipsia.  Allergies:  No history of asthma, hives, eczema or rhinitis.    PHYSICAL EXAM:  Temp: 95.5  F (35.3  C) Temp src: Oral BP: 120/79 Pulse: (!) 49 Heart Rate: 54 Resp: 16 SpO2: 90 % O2 Device: None (Room air)    Vital Signs with Ranges  Temp:  [95.4  F (35.2  C)-97.7  F  (36.5  C)] 95.5  F (35.3  C)  Pulse:  [49] 49  Heart Rate:  [48-56] 54  Resp:  [9-22] 16  BP: ()/() 120/79  SpO2:  [90 %-94 %] 90 %  158 lbs 8 oz    Constitutional: awake, alert, no distress  Eyes: PERRL, sclera nonicteric  ENT: trachea midline  Respiratory: Lungs clear  Cardiovascular: Regular rate and rhythm, 3/6 holosystolic murmur at the sternal border, JVD elevated to the mid neck with head of bed at 30 , minimal abdominal distention, no ankle edema  GI: nondistended, nontender, bowel sounds present  Lymph/Hematologic: no lymphadenopathy  Skin: dry, no rash  Musculoskeletal: good muscle tone, strength 5/5 in upper and lower extremities  Neurologic: no focal deficits  Neuropsychiatric: appropriate affact      Intake/Output Summary (Last 24 hours) at 02/17/18 0900  Last data filed at 02/17/18 0428   Gross per 24 hour   Intake                3 ml   Output              200 ml   Net             -197 ml     DATA:  Labs: Sodium 132, potassium 4.8, creatinine 3.4, BUN 63, LFTs normal, troponin 0 0.1, TSH 5.1, free T4 1.5, hemoglobin 14     EKG: February 16: Sinus bradycardia, first-degree AV block, rate 55, low voltage    Tele: sinus, rate 48-50's, no arrhythmias    Echo: 2-17: normal LV size, EF 40%, global HK, moderate RV enlargement with mild-moderate HK, mild-moderate MR, moderate-severe TR, dilated IVC

## 2018-02-18 NOTE — DISCHARGE SUMMARY
[facility]    Discharge Summary  Hospitalist    Date of Admission:  2/16/2018  Date of Discharge:  2/17/2018  1:50 PM  Discharging Provider: Liliam Armendariz MD  Date of Service (when I saw the patient): 2/17/18    Discharge Diagnoses      1. Dizziness     2. 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 Elevated trop     5. Dilated CMP with EF 30-35%     6. H/o A fib, flutter s/p ablation     7. Hypothyroidism    History of Present Illness   Misael Fischer is an 72 year old male who presented with dizziness. Please see H&P for details    Hospital Course      Dizziness: symptoms are orthostatic in nature. He developed symptoms after doubling up the dose of his torsemide from 5->10 mg when he noted weight gain in the past 4 days. After taking the increased dose his weight dropped by 4 lbs in 24 hrs (despite not noticing any increased UOP) and he also developed dizziness with standing.   --given 500 cc bolus in the ER, hold further fluids given his cardiac hx and poor LVEF  --repeat TTE in the AM to r/o further worsening  --cardiac monitoring to r/o arrythmias causing dizziness  --Cardiology consulted and recommended to continue his outpatient medications. He was advised to have outpatient follow up.     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 He received 500 cc of IVF in the ER.  He does not appear volume overloaded on exam. TTE would be helpful to determine his intravascular volume status as well. Pt was recommended to establish care with nephrology as outpatient given his CKD stage 3-4 at baseline.       Elevated trop: he recently had coronary angiogram with no significant CAD. He had no evidence of ACS during this admission.     Dilated CMP with EF 30-35%: previously thought to be tachycardia mediated but has not improved despite rate control. Recent coronary angiogram in Nov 2017 was -ve for ischemic heart disease.      H/o A fib, flutter s/p ablation: he is on apixiban. Will cont. Not on any  BB due to intolerance (dizziness, fatigue). He is slightly bradycardic which is his normal HR per pt  --he was previously on amiodarone but taken off of it in Nov due to interaction with levothyroxine   --cont cardiac monitoring      Hypothyroidism: the patient developed severe hypothyroidism while on amiodarone, for which amiodarone was ultimately stopped. His TSH has been improving from 56->5 now.  --cont same dose of levothyroxine    Patient remained stable during hospital course. He was discharged in a stable condition.     Significant Results and Procedures   Results for orders placed or performed during the hospital encounter of 03/17/16   US Renal Complete    Narrative    ULTRASOUND RETROPERITONEAL COMPLETE  3/17/2016 10:07 AM     HISTORY: Chronic kidney disease.    COMPARISON: None.    FINDINGS: The bilateral renal parenchyma are normal in echogenicity  without evidence for shadowing stone or mass. No renal cysts. No  hydronephrosis. Right kidney measures 9.0 x 5.7 x 4.5 cm and the left  measures 8.9 x 5.2 x 4.6 cm. Cortical thickness is 2.0 cm on the right  and 1.6 cm on the left. Prostatomegaly measuring 5.7 x 5.0 x 5.0 cm.  Bladder is unremarkable given its level of distention.      Impression    IMPRESSION: No obstruction demonstrated.    RODRIGO TEJEDA MD         Pending Results   None  Code Status   Full Code       Primary Care Physician   Kevan Bull        Discharge Disposition   Discharged to home  Condition at discharge: Stable    Consultations This Hospital Stay   CARDIOLOGY IP CONSULT    Time Spent on this Encounter   I, Liliam Armendariz MD, personally saw the patient today and spent greater than 30 minutes discharging this patient.    Discharge Orders     Follow-up and recommended labs and tests    Follow up with primary care provider, Kevan Bull, within 7 days     Reason for your hospital stay   lightheadedness     Activity   Your activity upon discharge: activity as  tolerated     Diet   Follow this diet upon discharge: Orders Placed This Encounter     Combination Diet Low Saturated Fat Na <2400mg Diet       Discharge Medications   Discharge Medication List as of 2/17/2018  1:28 PM      CONTINUE these medications which have NOT CHANGED    Details   !! levothyroxine (SYNTHROID/LEVOTHROID) 100 MCG tablet Take 100 mcg by mouth On Mon, Tue, Wed, Thur, Sat, Sun, Historical      !! levothyroxine (SYNTHROID/LEVOTHROID) 100 MCG tablet Take 50 mcg by mouth Once a week on Fri, Historical      Acetaminophen (TYLENOL PO) Take 500 mg by mouth every morning, Historical      VITAMIN D, CHOLECALCIFEROL, PO Take 1,000 Units by mouth daily , Historical      torsemide (DEMADEX) 5 MG tablet Take 1 tablet (5 mg) by mouth daily Down to 5mg daily, Disp-90 tablet, R-1, E-Prescribe      apixaban ANTICOAGULANT (ELIQUIS) 5 MG tablet Take 1 tablet (5 mg) by mouth 2 times daily, Disp-180 tablet, R-3, E-Prescribe       !! - Potential duplicate medications found. Please discuss with provider.      STOP taking these medications       Levothyroxine Sodium 100 MCG CAPS Comments:   Reason for Stopping:             # Pain Assessment: None  Current Pain Score 2/17/2018 2/17/2018 2/16/2018   Patient currently in pain? denies denies denies   Pain score (0-10) 0 0 -   Pain location - - -   Pain descriptors - - -   Misael s pain level was assessed and he currently denies pain.      Allergies   Allergies   Allergen Reactions     Ace Inhibitors      Due to CKD, renal insufficiency     Data   Most Recent 3 CBC's:  Recent Labs   Lab Test  02/17/18   0613  02/16/18   1727  12/14/17   1201   WBC  4.4  5.9  5.3   HGB  14.6  16.4  17.5   MCV  97  98  98   PLT  123*  145*  145*      Most Recent 3 BMP's:  Recent Labs   Lab Test  02/17/18   0613  02/16/18   1727  12/14/17   1201   NA  132*  129*  137   POTASSIUM  4.8  5.3  4.8   CHLORIDE  100  95  103   CO2  22  24  27   BUN  63*  57*  30   CR  3.37*  3.55*  2.17*   ANIONGAP  10   10  7   TAYLOR  8.3*  9.2  8.9   GLC  73  87  95     Most Recent 2 LFT's:  Recent Labs   Lab Test  02/17/18   0613  12/14/17   1201   AST  33  47*   ALT  27  42   ALKPHOS  88  123   BILITOTAL  1.1  1.4*     Most Recent INR's and Anticoagulation Dosing History:  Anticoagulation Dose History     Recent Dosing and Labs Latest Ref Rng & Units 12/14/2015 3/21/2016 11/17/2017    INR 0.86 - 1.14 1.95(H) 1.80(H) 1.16(H)        Most Recent 3 Troponin's:  Recent Labs   Lab Test  02/16/18   1727  11/26/15   0645  11/25/15   2150   TROPI  0.111*  0.125*  0.132*     Most Recent Cholesterol Panel:  Recent Labs   Lab Test 03/31/16   CHOL  160   LDL  77   HDL  69   TRIG  68     Most Recent 6 Bacteria Isolates From Any Culture (See EPIC Reports for Culture Details):No lab results found.  Most Recent TSH, T4 and A1c Labs:  Recent Labs   Lab Test  02/16/18   1727  03/31/16   TSH  5.10*   < >  7.6   T4  1.55*   --   1.2   A1C   --    --   6.0    < > = values in this interval not displayed.

## 2018-04-03 DIAGNOSIS — I36.1 NON-RHEUMATIC TRICUSPID VALVE INSUFFICIENCY: ICD-10-CM

## 2018-04-03 RX ORDER — TORSEMIDE 5 MG/1
5 TABLET ORAL DAILY
Qty: 90 TABLET | Refills: 2 | Status: SHIPPED | OUTPATIENT
Start: 2018-04-03 | End: 2018-06-07

## 2018-05-23 ENCOUNTER — TELEPHONE (OUTPATIENT)
Dept: CARDIOLOGY | Facility: CLINIC | Age: 73
End: 2018-05-23

## 2018-05-23 NOTE — TELEPHONE ENCOUNTER
Pt called and is in [place], but states that he is noticing that his HR is slow and then it speeds up. Pt states that anytime he eats anything he gets bloated, but feels fine in the morning. Pt states that he feels unbalanced.  Pt taking all his medications and did state that he increased his torsemide to 15 mg because of the abdomen swelling and also his LE edema.  Pt wondering about if he should restart Amiodarone, this writer told pt no, because at this time it is unknown what pt rhythm is doing.  Pt has a lot going on and at this time will have pt see Rajni on 5/29 at 1430 with and EKG. Monica

## 2018-05-29 ENCOUNTER — DOCUMENTATION ONLY (OUTPATIENT)
Dept: CARDIOLOGY | Facility: CLINIC | Age: 73
End: 2018-05-29

## 2018-05-29 ENCOUNTER — OFFICE VISIT (OUTPATIENT)
Dept: CARDIOLOGY | Facility: CLINIC | Age: 73
End: 2018-05-29
Payer: MEDICARE

## 2018-05-29 VITALS
HEIGHT: 68 IN | DIASTOLIC BLOOD PRESSURE: 88 MMHG | SYSTOLIC BLOOD PRESSURE: 130 MMHG | WEIGHT: 155 LBS | BODY MASS INDEX: 23.49 KG/M2 | HEART RATE: 68 BPM

## 2018-05-29 DIAGNOSIS — I48.19 PERSISTENT ATRIAL FIBRILLATION (H): Primary | ICD-10-CM

## 2018-05-29 DIAGNOSIS — I48.19 PERSISTENT ATRIAL FIBRILLATION (H): ICD-10-CM

## 2018-05-29 DIAGNOSIS — I48.3 TYPICAL ATRIAL FLUTTER (H): ICD-10-CM

## 2018-05-29 DIAGNOSIS — R42 DIZZINESS: ICD-10-CM

## 2018-05-29 PROCEDURE — 93000 ELECTROCARDIOGRAM COMPLETE: CPT | Performed by: PHYSICIAN ASSISTANT

## 2018-05-29 PROCEDURE — 99214 OFFICE O/P EST MOD 30 MIN: CPT | Performed by: PHYSICIAN ASSISTANT

## 2018-05-29 RX ORDER — AMIODARONE HYDROCHLORIDE 200 MG/1
200 TABLET ORAL DAILY
Start: 2018-05-29 | End: 2018-05-29

## 2018-05-29 RX ORDER — AMIODARONE HYDROCHLORIDE 200 MG/1
TABLET ORAL
Start: 2018-05-29 | End: 2018-09-17

## 2018-05-29 NOTE — LETTER
5/29/2018    Kevan Bull MD  [address]  [facility] [address]    RE: Misael Fischer       Dear Colleague,    I had the pleasure of seeing Misael Fischer in the [facility].    HPI and Plan:   See dictation #[id number]    Orders Placed This Encounter   Procedures     Follow-Up with Electrophysiologist     EKG 12-lead complete w/read - Clinics (performed today)     EKG 12-lead complete w/read - Clinics (to be scheduled)     Cardioversion       Orders Placed This Encounter   Medications     amiodarone (PACERONE/CODARONE) 200 MG tablet     Sig: Take 1 tablet (200 mg) by mouth daily       There are no discontinued medications.      Encounter Diagnoses   Name Primary?     Typical atrial flutter (H)      Dizziness      Persistent atrial fibrillation (H) Yes       CURRENT MEDICATIONS:  Current Outpatient Prescriptions   Medication Sig Dispense Refill     Acetaminophen (TYLENOL PO) Take 500 mg by mouth every morning       amiodarone (PACERONE/CODARONE) 200 MG tablet Take 1 tablet (200 mg) by mouth daily       apixaban ANTICOAGULANT (ELIQUIS) 5 MG tablet Take 1 tablet (5 mg) by mouth 2 times daily 180 tablet 3     levothyroxine (SYNTHROID/LEVOTHROID) 100 MCG tablet Take 100 mcg by mouth On Mon, Tue, Wed, Thur, Sat, Sun       levothyroxine (SYNTHROID/LEVOTHROID) 100 MCG tablet Take 50 mcg by mouth Once a week on Fri       torsemide (DEMADEX) 5 MG tablet Take 1 tablet (5 mg) by mouth daily Down to 5mg daily (Patient taking differently: Take 5 mg by mouth daily 1-2 tab daily.) 90 tablet 2     VITAMIN D, CHOLECALCIFEROL, PO Take 1,000 Units by mouth daily          ALLERGIES     Allergies   Allergen Reactions     Ace Inhibitors      Due to CKD, renal insufficiency       PAST MEDICAL HISTORY:  Past Medical History:   Diagnosis Date     Atrial flutter (H) 11/23/15     AVNRT (AV jade re-entry tachycardia) (H)     status post     BPH (benign prostatic hyperplasia)      CAD (coronary artery  disease)     Abnormal Nuc 2014     Cardiomyopathy (H)     Echo 11/2015- EF 35-40%     CHF (congestive heart failure) (H) 11/25/2015    Echo 11/2015- EF 35-40%      CKD (chronic kidney disease)     not on ACE/ARB due to insufficiency     Elevated PSA      Hypercholesterolemia 7/10/2014     Malaria      Mild pulmonic regurgitation and RV dysfunction by prior echocardiogram      Non-rheumatic tricuspid valve insufficiency      Paroxysmal a-fib (H)      Persistent atrial fibrillation with rapid ventricular response (H)      Pigment deposition      Systolic heart failure (H)     Echo 11/2015- EF 35-40%     Tricuspid regurgitation     Mod 2016       PAST SURGICAL HISTORY:  Past Surgical History:   Procedure Laterality Date     ANESTHESIA CARDIOVERSION N/A 12/6/2016    Procedure: ANESTHESIA CARDIOVERSION;  Surgeon: GENERIC ANESTHESIA PROVIDER;  Location:  OR     C KP (TRANSESOPHAGEAL ECHO)  12/6/16     CARDIOVERSION  12/14/15     H ABLATION ATRIAL FLUTTER  3/21/16     ORTHOPEDIC SURGERY Left 1957    foot injury repair       FAMILY HISTORY:  Family History   Problem Relation Age of Onset     C.A.D. Mother      Prostate Cancer Father 73       SOCIAL HISTORY:  Social History     Social History     Marital status:      Spouse name: N/A     Number of children: N/A     Years of education: N/A     Social History Main Topics     Smoking status: Never Smoker     Smokeless tobacco: Never Used     Alcohol use Yes      Comment: rare     Drug use: No     Sexual activity: Not Currently     Other Topics Concern     Caffeine Concern No     Occupational Exposure No     none     Sleep Concern No     Stress Concern No     Weight Concern No     Special Diet Yes     low salt     Exercise Yes     walking     Seat Belt Yes     Social History Narrative       Review of Systems:  Skin:  Positive for   dry itchy skin   Eyes:  Positive for glasses    ENT:  Negative      Respiratory:  Positive for dyspnea on exertion     Cardiovascular:   "Negative for;chest pain;edema Positive for;dizziness;fatigue;palpitations    Gastroenterology: Negative for melena;hematochezia    Genitourinary:  Negative      Musculoskeletal:  Positive for joint pain    Neurologic:  Positive for headaches    Psychiatric:  Negative      Heme/Lymph/Imm:  Positive for allergies RX  Endocrine:  Positive for cold intolerance last TSH was elevated; more sensitive to cold    Physical Exam:  Vitals: /88  Pulse 68  Ht 1.727 m (5' 8\")  Wt 70.3 kg (155 lb)  BMI 23.57 kg/m2    Constitutional:  cooperative, alert and oriented, well developed, well nourished, in no acute distress        Skin:  warm and dry to the touch, no apparent skin lesions or masses noted          Head:  normocephalic, no masses or lesions        Eyes:  pupils equal and round;conjunctivae and lids unremarkable;sclera white;no xanthalasma        Lymph:      ENT:  no pallor or cyanosis, dentition good        Neck:  no carotid bruit JVP elevated      Respiratory:  normal breath sounds, clear to auscultation, normal A-P diameter, normal symmetry, normal respiratory excursion, no use of accessory muscles         Cardiac:   irregularly irregular rhythm              pulses full and equal                                        GI:  abdomen soft        Extremities and Muscular Skeletal:  no deformities, clubbing, cyanosis, erythema observed;no edema              Neurological:  no gross motor deficits        Psych:  Alert and Oriented x 3              Thank you for allowing me to participate in the care of your patient.      Sincerely,     Stephanie Jaimes PA-C     [facility]    cc:   No referring provider defined for this encounter.        "

## 2018-05-29 NOTE — PROGRESS NOTES
"Spoke with patient and he wanted to \"think about\" planning DCCV on Amiodarone load prior to [place] trip vs plan outlined in clinic with amiodarone 200 mg daily and repeat OV in 3 weeks.    Marcela in Scheduling called pt and pt is agreeable to having DCCV in [place] on Monday 6/4. He's remained on AC (Eliquis 5 mg BID) without interruption.    Pls have him load amiodarone:    400 mg (2 of the 200 mg tabs) tonight (5/29) and then BID 5/30 and 5/31.  On Friday 6/1 decrease to 200 mg (1 tab) twice daily until Sunday 6/4  On Monday, 6/4, decrease to 100 mg daily (1/2 of the 200 mg tab).      Thx  "

## 2018-05-29 NOTE — PATIENT INSTRUCTIONS
"1. EKG today shows that you're back in atrial fibrillation. Your heart rate is under good control so we did talk about \"rate control strategy\" vs a \"rhythm control strategy.\"  Either way, you will stay on Eliquis 5 mg twice daily    2. As we discussed, we'll restart Amiodarone 200 mg daily. No loading dose as [place]    3. See us back to see if Amiodarone got you back into normal rhythm    4. Our nurses are Rylie Ruelas and Javier: [phone]  "

## 2018-05-29 NOTE — MR AVS SNAPSHOT
"              After Visit Summary   5/29/2018    Misael Fischer    MRN: [id number]           Patient Information     Date Of Birth          1945        Visit Information        Provider Department      5/29/2018 2:30 PM Stephanie Jaimes PA-C [facility]        Today's Diagnoses     Persistent atrial fibrillation (H)    -  1    Typical atrial flutter (H)        Dizziness          Care Instructions    1. EKG today shows that you're back in atrial fibrillation. Your heart rate is under good control so we did talk about \"rate control strategy\" vs a \"rhythm control strategy.\"  Either way, you will stay on Eliquis 5 mg twice daily    2. As we discussed, we'll restart Amiodarone 200 mg daily. No loading dose as [place]    3. See us back to see if Amiodarone got you back into normal rhythm    4. Our nurses are Rylie Ruelas and Bill: [phone]          Follow-ups after your visit        Additional Services     Follow-Up with Electrophysiologist                 Future tests that were ordered for you today     Open Future Orders        Priority Expected Expires Ordered    EKG 12-lead complete w/read - Clinics (to be scheduled) Routine 6/19/2018 5/29/2019 5/29/2018    Follow-Up with Electrophysiologist Routine 6/19/2018 5/29/2019 5/29/2018            Who to contact     If you have questions or need follow up information about today's clinic visit or your schedule please contact [facility] directly at [phone].  Normal or non-critical lab and imaging results will be communicated to you by MyChart, letter or phone within 4 business days after the clinic has received the results. If you do not hear from us within 7 days, please contact the clinic through MyChart or phone. If you have a critical or abnormal lab result, we will notify you by phone as soon as possible.  Submit refill requests through " "[employer] or call your pharmacy and they will forward the refill request to us. Please allow 3 business days for your refill to be completed.          Additional Information About Your Visit        MyChart Information     [employer] gives you secure access to your electronic health record. If you see a primary care provider, you can also send messages to your care team and make appointments. If you have questions, please call your primary care clinic.  If you do not have a primary care provider, please call [phone] and they will assist you.        Care EveryWhere ID     This is your Care EveryWhere ID. This could be used by other organizations to access your [facility] medical records  [id number]        Your Vitals Were     Pulse Height BMI (Body Mass Index)             68 1.727 m (5' 8\") 23.57 kg/m2          Blood Pressure from Last 3 Encounters:   05/29/18 130/88   02/17/18 116/74   12/14/17 135/86    Weight from Last 3 Encounters:   05/29/18 70.3 kg (155 lb)   02/17/18 71.9 kg (158 lb 8 oz)   12/14/17 68.6 kg (151 lb 3.2 oz)              We Performed the Following     EKG 12-lead complete w/read - Clinics (performed today)          Today's Medication Changes          These changes are accurate as of 5/29/18  3:02 PM.  If you have any questions, ask your nurse or doctor.               Start taking these medicines.        Dose/Directions    amiodarone 200 MG tablet   Commonly known as:  PACERONE/CODARONE   Used for:  Persistent atrial fibrillation (H)   Started by:  Stephanie Jaimes PA-C        Dose:  200 mg   Take 1 tablet (200 mg) by mouth daily   Refills:  0         These medicines have changed or have updated prescriptions.        Dose/Directions    torsemide 5 MG tablet   Commonly known as:  DEMADEX   This may have changed:  additional instructions   Used for:  Non-rheumatic tricuspid valve insufficiency        Dose:  5 mg   Take 1 tablet (5 mg) by mouth daily Down to 5mg daily   Quantity:  90 tablet "   Refills:  2            Where to get your medicines      Some of these will need a paper prescription and others can be bought over the counter.  Ask your nurse if you have questions.     You don't need a prescription for these medications     amiodarone 200 MG tablet                Primary Care Provider Office Phone # Fax #    Kevan Bull [phone] [address]  [facility] [address]        Equal Access to Services     [facility] Hospital[facility]ILARY : Hadii aad ku hadasho Soomaali, waaxda luqadaha, qaybta kaalmada adeegyada, waxay idiin hayaan adeeg kharash la'aan ah. So [facility] [phone].    ATENCIÓN: Si habla espamelia, tiene a stewart disposición servicios gratuitos de asistencia lingüística. Ll[facility]arietta [facility] [phone].    We comply with applicable federal civil rights laws and [place] laws. We do not discriminate on the basis of race, color, national origin, age, disability, sex, sexual orientation, or gender identity.            Thank you!     Thank you for choosing [facility]  for your care. Our goal is always to provide you with excellent care. Hearing back from our patients is one way we can continue to improve our services. Please take a few minutes to complete the written survey that you may receive in the mail after your visit with us. Thank you!             Your Updated Medication List - Protect others around you: Learn how to safely use, store and throw away your medicines at www.disposemymeds.org.          This list is accurate as of 5/29/18  3:02 PM.  Always use your most recent med list.                   Brand Name Dispense Instructions for use Diagnosis    amiodarone 200 MG tablet    PACERONE/CODARONE     Take 1 tablet (200 mg) by mouth daily    Persistent atrial fibrillation (H)       apixaban ANTICOAGULANT 5 MG tablet    ELIQUIS    180 tablet    Take 1 tablet (5 mg) by mouth 2 times daily    Persistent atrial fibrillation (H)       *  levothyroxine 100 MCG tablet    SYNTHROID/LEVOTHROID     Take 100 mcg by mouth On Mon, Tue, Wed, Thur, Sat, Sun        * levothyroxine 100 MCG tablet    SYNTHROID/LEVOTHROID     Take 50 mcg by mouth Once a week on Fri        torsemide 5 MG tablet    DEMADEX    90 tablet    Take 1 tablet (5 mg) by mouth daily Down to 5mg daily    Non-rheumatic tricuspid valve insufficiency       TYLENOL PO      Take 500 mg by mouth every morning        VITAMIN D (CHOLECALCIFEROL) PO      Take 1,000 Units by mouth daily        * Notice:  This list has 2 medication(s) that are the same as other medications prescribed for you. Read the directions carefully, and ask your doctor or other care provider to review them with you.

## 2018-05-29 NOTE — LETTER
5/29/2018      Kevan Bull MD  [address]  [facility] [address]      RE: Misael Fischer       Dear Colleague,    I had the pleasure of seeing Misael Fischer in the [facility].    Service Date: 05/29/2018      HISTORY OF PRESENT ILLNESS:  I had the pleasure of seeing Paul today when he came for concerns regarding recurrent atrial fibrillation.  He is a very pleasant 72-year-old with a history of chronic renal insufficiency with creatinines over 3.  He has obstructive sleep apnea on CPAP therapy and a history of atrial arrhythmias.  In 11/2015, he developed heart failure and was noted to be in atrial flutter.  He underwent KP and DC cardioversion but went back into atrial flutter and was placed on amiodarone.  He underwent atrial flutter and AVNRT ablation in 03/2016.      Unfortunately, he then developed atrial fibrillation, first diagnosed in 11/2016.  He underwent KP which showed evidence of a clot and was anticoagulated with repeat KP in 12/2016.  He then proceeded with DC cardioversion.  He was placed on amiodarone.  Again at that time this caused significant thyroid issues with a TSH of greater than 32.  Amiodarone was decreased.  He was treated by Endocrinology and ultimately amiodarone was discontinued in 11/2017.  It was recommended that given his high CHADS-VASc score, he remain on anticoagulation for life.      Dr. Larkin saw him back in December, at which time he was doing well.  He had been off of amiodarone for roughly 1 month.  A ZIO Patch showed no evidence of atrial fibrillation or atrial flutter.  PVC burden was 3.4%.      In 02/2018, he came into [facility] secondary to significant dizziness.  He was orthostatic and it was noted that he had doubled his dose of torsemide from 5 mg to 10 mg daily secondary to fluid retention/weight gain.  His weight dropped 4 pounds in 24 hours and he developed lightheadedness.  He was seen by Dr. 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  No significant changes were made and it was recommended that his goal weight be roughly 150 pounds.      Finally, we know he has a history of mild coronary disease based on angiogram done in 11/2017.  He had issues with right-sided heart failure secondary to significant tricuspid regurgitation and a number of years ago was evaluated at [facility] for consideration of a tricuspid valve repair.  He initially was recommended for tricuspid valve repair and/or replacement, but ultimately opted not to proceed with that.  He sees Dr. Castro and in December saw Dr. Wetzel at the  .  At that time Dr. Wetzel reviewed his RV dysfunction and significant tricuspid regurgitation and a right heart catheterization was done 11/2017.  At that time, he was noted to have a cardiac index of only 1.0 and a wedge of 20.  He was noted to have a severely reduced ejection fraction, low flow and at least moderate mitral regurgitation with trabeculated left ventricle.        Cardiac MRI done in 2016 at [facility] showed no evidence of ARVD however, Dr. Wetzel was unsure of contrast was used for consideration of infiltrative disease.  He has chronically had low voltage on EKGs.      When he saw Dr. Wetzel 12/14/2017, he noted that the severely reduced cardiac index did not necessarily match up with the ejection fraction of only 35%, as his creatinine kept climbing.  He suggested a repeat echocardiogram and right heart catheterization, noting that he may benefit from inotropic therapy/LVAD evaluation if his cardiac index remained low.  This was never scheduled as the patient wanted to take some time to review this.  He has not seen Dr. Wetzel since and as above was hospitalized at [facility] in February.      Paul contacted our office last week due to concerns regarding increasing palpitations and feeling like his heart was irregular again.  He was  concerned was back in atrial fibrillation.  He wondered if he should restart amiodarone on his own.  It was recommended against it and he was instructed to see me when he returned from a trip to [place].      Paul tells me that the only thing he can think of that he did differently about 3 weeks ago as he had some wine and he typically does not drink.  He is wondering if that could have flipped him back into atrial fibrillation.  He states that he gets symptoms such as palpitations when he lies on his right side and in the past had gotten trouble with more significant fatigue on while in atrial fibrillation.  He denies any chest pain, pressure or tightness.  Denies edema, orthopnea or PND.  He remains on anticoagulation with Eliquis without interruption.      Angiogram done 11/2017 showed just mild coronary disease with hemodynamics as noted above.  Echocardiogram done 02/2018 showed an EF of 40% (previously 30%-35%), mild to moderate dilatation of the RV, decreased right ventricular systolic function (mild to moderate), 2+ mitral regurgitation, 2+ tricuspid regurgitation and RVSP of 10+ right atrial pressure.  CVP was elevated.      EKG today, which I overread, continues to show atrial fibrillation at 90 beats per minute with multifocal PVCs.        ASSESSMENT AND PLAN:     1.  Recurrent paroxysmal atrial fibrillation.  His symptoms current are not terrible but he notes that he is due to leave for [place] next week and he is worried that as he remains in atrial fibrillation, he could have more trouble with this.      We discussed options including rate control versus anticoagulation.  He previously had done poorly on beta blockers causing significant lightheadedness and dizziness.  I explained that we could always add a calcium channel blocker for continued heart rate control.  We would do this if he was not terribly symptomatic, but as above, he worries that he could have worsening symptoms the longer he is in  atrial fibrillation.        We also talked about rate, rhythm control.  Unfortunately, given his significant renal impairment and cardiomyopathy, we cannot use anything really other than the amiodarone therapy, which as above, caused significant derangements in his thyroid levels.  He does see Dr. Post for Endocrinology and has an appointment with him next week.      At this time, we talked reinitiating amiodarone and trying to get a cardioversion before he left for [facility].  We will work on scheduling with this, but if that is the case, then we would want to load him with amiodarone 200 mg twice daily starting Wednesday and then on Monday undergo DC cardioversion.  He would likely have his amiodarone reduced at that time, given concerns for recurrent thyroid issues.      I will talk to Dr. Larkin but we did discuss risks, benefits and indications of this including but not limited to stroke (which should be mitigated as he tells me he has not missed any doses of Eliquis), surface burns or tachy or bradyarrhythmias requiring further treatment.  He voiced understanding and would like to think about this.      We also talked about restarting amiodarone but just 200 mg daily (without a load) with a plan to see him back as soon as he returns from his 3-week trip to [facility] to determine if he has been able to chemically convert to normal rhythm or if he would need cardioversion at that time.  Dr. Larkin more worried about that option given the concern for significant bradycardia with conversion on amiodarone therapy, but his heart rate tends to be in the 50-70s routinely and he tolerated that without problems.      Paul is going to think about this.  We do have him down on the schedule for [place] on Monday for cardioversion.  He was instructed to continue all of his medications until he determines which course of action he would like to take.      It has been a pleasure to see Paul in clinic.         IMTIAZ ALCANTAR,  WOODY             D: 2018   T: 2018   MT: JOYCE      Name:     TREY BURGER   MRN:      [id number]        Account:      [id number]   :      1945           Service Date: 2018      Document: [id number]           Outpatient Encounter Prescriptions as of 2018   Medication Sig Dispense Refill     Acetaminophen (TYLENOL PO) Take 500 mg by mouth every morning       apixaban ANTICOAGULANT (ELIQUIS) 5 MG tablet Take 1 tablet (5 mg) by mouth 2 times daily 180 tablet 3     levothyroxine (SYNTHROID/LEVOTHROID) 100 MCG tablet Take 100 mcg by mouth On Mon, Tue, Wed, Thur, Sat, Sun       levothyroxine (SYNTHROID/LEVOTHROID) 100 MCG tablet Take 50 mcg by mouth Once a week on Fri       torsemide (DEMADEX) 5 MG tablet Take 1 tablet (5 mg) by mouth daily Down to 5mg daily (Patient taking differently: Take 5 mg by mouth daily 1-2 tab daily.) 90 tablet 2     VITAMIN D, CHOLECALCIFEROL, PO Take 1,000 Units by mouth daily        [DISCONTINUED] amiodarone (PACERONE/CODARONE) 200 MG tablet Take 1 tablet (200 mg) by mouth daily       No facility-administered encounter medications on file as of 2018.        Again, thank you for allowing me to participate in the care of your patient.      Sincerely,    Stephanie Jaimes PA-C     [facility]

## 2018-05-29 NOTE — PROGRESS NOTES
Spoke to patient regarding loading dose for amiodarone.  Per Rajni:  Pls have him load amiodarone:    400 mg (2 of the 200 mg tabs) tonight (5/29) and then BID 5/30 and 5/31.  On Friday 6/1 decrease to 200 mg (1 tab) twice daily until Sunday 6/4  On Monday, 6/4, decrease to 100 mg daily (1/2 of the 200 mg tab).    Patient provided verbal understanding of above.  Medication list updated in epic.  CLIFFORD Ball

## 2018-05-29 NOTE — PROGRESS NOTES
HPI and Plan:   See dictation #[id number]    Orders Placed This Encounter   Procedures     Follow-Up with Electrophysiologist     EKG 12-lead complete w/read - Clinics (performed today)     EKG 12-lead complete w/read - Clinics (to be scheduled)     Cardioversion       Orders Placed This Encounter   Medications     amiodarone (PACERONE/CODARONE) 200 MG tablet     Sig: Take 1 tablet (200 mg) by mouth daily       There are no discontinued medications.      Encounter Diagnoses   Name Primary?     Typical atrial flutter (H)      Dizziness      Persistent atrial fibrillation (H) Yes       CURRENT MEDICATIONS:  Current Outpatient Prescriptions   Medication Sig Dispense Refill     Acetaminophen (TYLENOL PO) Take 500 mg by mouth every morning       amiodarone (PACERONE/CODARONE) 200 MG tablet Take 1 tablet (200 mg) by mouth daily       apixaban ANTICOAGULANT (ELIQUIS) 5 MG tablet Take 1 tablet (5 mg) by mouth 2 times daily 180 tablet 3     levothyroxine (SYNTHROID/LEVOTHROID) 100 MCG tablet Take 100 mcg by mouth On Mon, Tue, Wed, Thur, Sat, Sun       levothyroxine (SYNTHROID/LEVOTHROID) 100 MCG tablet Take 50 mcg by mouth Once a week on Fri       torsemide (DEMADEX) 5 MG tablet Take 1 tablet (5 mg) by mouth daily Down to 5mg daily (Patient taking differently: Take 5 mg by mouth daily 1-2 tab daily.) 90 tablet 2     VITAMIN D, CHOLECALCIFEROL, PO Take 1,000 Units by mouth daily          ALLERGIES     Allergies   Allergen Reactions     Ace Inhibitors      Due to CKD, renal insufficiency       PAST MEDICAL HISTORY:  Past Medical History:   Diagnosis Date     Atrial flutter (H) 11/23/15     AVNRT (AV jade re-entry tachycardia) (H)     status post     BPH (benign prostatic hyperplasia)      CAD (coronary artery disease)     Abnormal Nuc 2014     Cardiomyopathy (H)     Echo 11/2015- EF 35-40%     CHF (congestive heart failure) (H) 11/25/2015    Echo 11/2015- EF 35-40%      CKD (chronic kidney disease)     not on ACE/ARB due to  insufficiency     Elevated PSA      Hypercholesterolemia 7/10/2014     Malaria      Mild pulmonic regurgitation and RV dysfunction by prior echocardiogram      Non-rheumatic tricuspid valve insufficiency      Paroxysmal a-fib (H)      Persistent atrial fibrillation with rapid ventricular response (H)      Pigment deposition      Systolic heart failure (H)     Echo 11/2015- EF 35-40%     Tricuspid regurgitation     Mod 2016       PAST SURGICAL HISTORY:  Past Surgical History:   Procedure Laterality Date     ANESTHESIA CARDIOVERSION N/A 12/6/2016    Procedure: ANESTHESIA CARDIOVERSION;  Surgeon: GENERIC ANESTHESIA PROVIDER;  Location:  OR     C KP (TRANSESOPHAGEAL ECHO)  12/6/16     CARDIOVERSION  12/14/15     H ABLATION ATRIAL FLUTTER  3/21/16     ORTHOPEDIC SURGERY Left 1957    foot injury repair       FAMILY HISTORY:  Family History   Problem Relation Age of Onset     C.A.D. Mother      Prostate Cancer Father 73       SOCIAL HISTORY:  Social History     Social History     Marital status:      Spouse name: N/A     Number of children: N/A     Years of education: N/A     Social History Main Topics     Smoking status: Never Smoker     Smokeless tobacco: Never Used     Alcohol use Yes      Comment: rare     Drug use: No     Sexual activity: Not Currently     Other Topics Concern     Caffeine Concern No     Occupational Exposure No     none     Sleep Concern No     Stress Concern No     Weight Concern No     Special Diet Yes     low salt     Exercise Yes     walking     Seat Belt Yes     Social History Narrative       Review of Systems:  Skin:  Positive for   dry itchy skin   Eyes:  Positive for glasses    ENT:  Negative      Respiratory:  Positive for dyspnea on exertion     Cardiovascular:  Negative for;chest pain;edema Positive for;dizziness;fatigue;palpitations    Gastroenterology: Negative for melena;hematochezia    Genitourinary:  Negative      Musculoskeletal:  Positive for joint pain    Neurologic:   "Positive for headaches    Psychiatric:  Negative      Heme/Lymph/Imm:  Positive for allergies RX  Endocrine:  Positive for cold intolerance last TSH was elevated; more sensitive to cold    Physical Exam:  Vitals: /88  Pulse 68  Ht 1.727 m (5' 8\")  Wt 70.3 kg (155 lb)  BMI 23.57 kg/m2    Constitutional:  cooperative, alert and oriented, well developed, well nourished, in no acute distress        Skin:  warm and dry to the touch, no apparent skin lesions or masses noted          Head:  normocephalic, no masses or lesions        Eyes:  pupils equal and round;conjunctivae and lids unremarkable;sclera white;no xanthalasma        Lymph:      ENT:  no pallor or cyanosis, dentition good        Neck:  no carotid bruit JVP elevated      Respiratory:  normal breath sounds, clear to auscultation, normal A-P diameter, normal symmetry, normal respiratory excursion, no use of accessory muscles         Cardiac:   irregularly irregular rhythm              pulses full and equal                                        GI:  abdomen soft        Extremities and Muscular Skeletal:  no deformities, clubbing, cyanosis, erythema observed;no edema              Neurological:  no gross motor deficits        Psych:  Alert and Oriented x 3            "

## 2018-06-04 ENCOUNTER — HOSPITAL ENCOUNTER (OUTPATIENT)
Facility: CLINIC | Age: 73
Discharge: HOME | End: 2018-06-04
Attending: INTERNAL MEDICINE | Admitting: INTERNAL MEDICINE
Payer: MEDICARE

## 2018-06-04 ENCOUNTER — HOSPITAL ENCOUNTER (OUTPATIENT)
Dept: CARDIOLOGY | Facility: CLINIC | Age: 73
End: 2018-06-04
Attending: PHYSICIAN ASSISTANT
Payer: MEDICARE

## 2018-06-04 ENCOUNTER — SURGERY (OUTPATIENT)
Age: 73
End: 2018-06-04

## 2018-06-04 ENCOUNTER — ANESTHESIA EVENT (OUTPATIENT)
Dept: SURGERY | Facility: CLINIC | Age: 73
End: 2018-06-04
Payer: MEDICARE

## 2018-06-04 ENCOUNTER — ANESTHESIA (OUTPATIENT)
Dept: SURGERY | Facility: CLINIC | Age: 73
End: 2018-06-04
Payer: MEDICARE

## 2018-06-04 ENCOUNTER — TELEPHONE (OUTPATIENT)
Dept: CARDIOLOGY | Facility: CLINIC | Age: 73
End: 2018-06-04

## 2018-06-04 VITALS
HEART RATE: 65 BPM | OXYGEN SATURATION: 98 % | RESPIRATION RATE: 16 BRPM | SYSTOLIC BLOOD PRESSURE: 119 MMHG | DIASTOLIC BLOOD PRESSURE: 88 MMHG

## 2018-06-04 DIAGNOSIS — I48.19 PERSISTENT ATRIAL FIBRILLATION (H): ICD-10-CM

## 2018-06-04 LAB
MAGNESIUM SERPL-MCNC: 2.6 MG/DL (ref 1.6–2.3)
POTASSIUM SERPL-SCNC: 4.1 MMOL/L (ref 3.4–5.3)

## 2018-06-04 PROCEDURE — 84132 ASSAY OF SERUM POTASSIUM: CPT | Performed by: INTERNAL MEDICINE

## 2018-06-04 PROCEDURE — 93010 ELECTROCARDIOGRAM REPORT: CPT | Performed by: INTERNAL MEDICINE

## 2018-06-04 PROCEDURE — 83735 ASSAY OF MAGNESIUM: CPT | Performed by: INTERNAL MEDICINE

## 2018-06-04 PROCEDURE — 37000008 ZZH ANESTHESIA TECHNICAL FEE, 1ST 30 MIN

## 2018-06-04 PROCEDURE — 92960 CARDIOVERSION ELECTRIC EXT: CPT | Performed by: INTERNAL MEDICINE

## 2018-06-04 PROCEDURE — 92960 CARDIOVERSION ELECTRIC EXT: CPT

## 2018-06-04 RX ORDER — POTASSIUM CHLORIDE 1500 MG/1
20 TABLET, EXTENDED RELEASE ORAL
Status: DISCONTINUED | OUTPATIENT
Start: 2018-06-04 | End: 2018-06-04 | Stop reason: HOSPADM

## 2018-06-04 RX ORDER — POTASSIUM CHLORIDE 1500 MG/1
40 TABLET, EXTENDED RELEASE ORAL
Status: DISCONTINUED | OUTPATIENT
Start: 2018-06-04 | End: 2018-06-04 | Stop reason: HOSPADM

## 2018-06-04 RX ORDER — ATROPINE SULFATE 0.1 MG/ML
.5-1 INJECTION INTRAVENOUS
Status: DISCONTINUED | OUTPATIENT
Start: 2018-06-04 | End: 2018-06-04 | Stop reason: HOSPADM

## 2018-06-04 RX ORDER — PROPOFOL 10 MG/ML
INJECTION, EMULSION INTRAVENOUS PRN
Status: DISCONTINUED | OUTPATIENT
Start: 2018-06-04 | End: 2018-06-04

## 2018-06-04 RX ORDER — SODIUM CHLORIDE 9 MG/ML
1000 INJECTION, SOLUTION INTRAVENOUS CONTINUOUS
Status: DISCONTINUED | OUTPATIENT
Start: 2018-06-04 | End: 2018-06-04 | Stop reason: HOSPADM

## 2018-06-04 RX ORDER — FLUMAZENIL 0.1 MG/ML
0.2 INJECTION, SOLUTION INTRAVENOUS
Status: DISCONTINUED | OUTPATIENT
Start: 2018-06-04 | End: 2018-06-04 | Stop reason: HOSPADM

## 2018-06-04 RX ORDER — NALOXONE HYDROCHLORIDE 0.4 MG/ML
.1-.4 INJECTION, SOLUTION INTRAMUSCULAR; INTRAVENOUS; SUBCUTANEOUS
Status: DISCONTINUED | OUTPATIENT
Start: 2018-06-04 | End: 2018-06-04 | Stop reason: HOSPADM

## 2018-06-04 RX ADMIN — PROPOFOL 30 MG: 10 INJECTION, EMULSION INTRAVENOUS at 11:36

## 2018-06-04 RX ADMIN — PROPOFOL 40 MG: 10 INJECTION, EMULSION INTRAVENOUS at 11:33

## 2018-06-04 ASSESSMENT — ENCOUNTER SYMPTOMS: DYSRHYTHMIAS: 1

## 2018-06-04 NOTE — ANESTHESIA PREPROCEDURE EVALUATION
PAC NOTE:  PAC Discussion and Assessment    ASA Classification: 3  Case is suitable for:   Anesthetic techniques and relevant risks discussed:   Invasive monitoring and risk discussed:   Types:   Possibility and Risk of blood transfusion discussed:   NPO instructions given:   Additional anesthetic preparation and risks discussed:   Needs early admission to pre-op area:   Other:     PAC Resident/NP Anesthesia Assessment:        Mid-Level Provider/Resident:   Date:   Time:     Attending Anesthesiologist Anesthesia Assessment:        Anesthesiologist:   Date:   Time:   Pass/Fail:   Disposition:     PAC Pharmacist Assessment:        Pharmacist:   Date:   Time:      ANESTHESIA PRE EVALUATION:  Anesthesia Evaluation     .             ROS/MED HX    ENT/Pulmonary:       Neurologic:       Cardiovascular:     (+) Dyslipidemia, hypertension--CAD, --. Taking blood thinners : . CHF Last EF: 30-35 . . :. dysrhythmias a-fib and a-flutter, valvular problems/murmurs TR:.       METS/Exercise Tolerance:     Hematologic:         Musculoskeletal:         GI/Hepatic:         Renal/Genitourinary:     (+) chronic renal disease, type: CRI, Pt does not require dialysis, Pt has no history of transplant, BPH,       Endo:     (+) thyroid problem hypothyroidism, .      Psychiatric:         Infectious Disease:         Malignancy:         Other:                     Physical Exam  Normal systems: pulmonary and dental    Airway   Mallampati: II  TM distance: <3 FB  Neck ROM: full    Dental     Cardiovascular   Rhythm and rate: irregular and abnormal      Pulmonary              Anesthesia Plan      History & Physical Review      ASA Status:  3 .    NPO Status:  > 6 hours and > 8 hours    Plan for MAC Reason for MAC:  Chronic cardiopulmonary disease (G9)         Postoperative Care      Consents  Anesthetic plan, risks, benefits and alternatives discussed with:  Patient.  Use of blood products discussed: No .   .                            .

## 2018-06-04 NOTE — PROGRESS NOTES
Cardiology Cardioversion Note:    The patient is a pleasant 72 year old with atrial flutter and PAF sent for a DCCV due to recurrent AF.    The patient has been on eliquis for greater than one month without missing a dose.  Informed consent was obtained after explaining the risks and benefits of the procedure with the patient who was in agreement.    Defibrillation pads were placed in the AP position and one 200 J shock was delivered resulting in SR.  No complications were noted.      Please see anesthesia's separate note for the sedation given for the DCCV.    Prabhakar Castro MD

## 2018-06-04 NOTE — IP AVS SNAPSHOT
[facility]    [address]    [facility] [address]    Phone:  [phone]                                       After Visit Summary   6/4/2018    Misael Fischer    MRN: [id number]           After Visit Summary Signature Page     I have received my discharge instructions, and my questions have been answered. I have discussed any challenges I see with this plan with the nurse or doctor.    ..........................................................................................................................................  Patient/Patient Representative Signature      ..........................................................................................................................................  Patient Representative Print Name and Relationship to Patient    ..................................................               ................................................  Date                                            Time    ..........................................................................................................................................  Reviewed by Signature/Title    ...................................................              ..............................................  Date                                                            Time

## 2018-06-04 NOTE — IP AVS SNAPSHOT
MRN:[id number]                      After Visit Summary   6/4/2018    Misael Fischer    MRN: [id number]           Visit Information        Department      6/4/2018  9:49 AM [facility] Cardiopulmonary          Review of your medicines      UNREVIEWED medicines. Ask your doctor about these medicines        Dose / Directions    amiodarone 200 MG tablet   Commonly known as:  PACERONE/CODARONE   Used for:  Persistent atrial fibrillation (H)        400 mg BID 5/29-5/31. On 6/1 decrease to 200 mg BID. On 6/4 decrease to 100 mg daily   Refills:  0       apixaban ANTICOAGULANT 5 MG tablet   Commonly known as:  ELIQUIS   Used for:  Persistent atrial fibrillation (H)        Dose:  5 mg   Take 1 tablet (5 mg) by mouth 2 times daily   Quantity:  180 tablet   Refills:  3       * levothyroxine 100 MCG tablet   Commonly known as:  SYNTHROID/LEVOTHROID        Dose:  100 mcg   Take 100 mcg by mouth On Mon, Tue, Wed, Thur, Sat, Sun   Refills:  0       * levothyroxine 100 MCG tablet   Commonly known as:  SYNTHROID/LEVOTHROID        Dose:  50 mcg   Take 50 mcg by mouth Once a week on Fri   Refills:  0       torsemide 5 MG tablet   Commonly known as:  DEMADEX   Used for:  Non-rheumatic tricuspid valve insufficiency        Dose:  5 mg   Take 1 tablet (5 mg) by mouth daily Down to 5mg daily   Quantity:  90 tablet   Refills:  2       TYLENOL PO        Dose:  500 mg   Take 500 mg by mouth every morning   Refills:  0       VITAMIN D (CHOLECALCIFEROL) PO        Dose:  1000 Units   Take 1,000 Units by mouth daily   Refills:  0       * Notice:  This list has 2 medication(s) that are the same as other medications prescribed for you. Read the directions carefully, and ask your doctor or other care provider to review them with you.             Protect others around you: Learn how to safely use, store and throw away your medicines at www.disposemymeds.org.         Follow-ups after your visit        Your next 10 appointments  already scheduled     Jun 25, 2018  3:15 PM CDT   [facility] EP RETURN with Ivan Anderson MD   [facility] ([facility])    [address] [id number]  [facility] [id number] [phone] OPT 2               Care Instructions        Further instructions from your care team         Recovery After Procedural Sedation (Adult)  You have been given medicine by vein to make you sleep during your surgery. This may have included both a pain medicine and sleeping medicine. Most of the effects have worn off. But you may still have some drowsiness for the next 6 to 8 hours.  Home care  Follow these guidelines when you get home:    For the next 8 hours, you should be watched by a responsible adult. This person should make sure your condition is not getting worse.    Don't drink any alcohol for the next 24 hours.    Don't drive, operate dangerous machinery, or make important business or personal decisions during the next 24 hours.  Note: Your healthcare provider may tell you not to take any medicine by mouth for pain or sleep in the next 4 hours. These medicines may react with the medicines you were given in the hospital. This could cause a much stronger response than usual.  Follow-up care  Follow up with your healthcare provider if you are not alert and back to your usual level of activity within 12 hours.  When to seek medical advice  Call your healthcare provider right away if any of these occur:    Drowsiness gets worse    Weakness or dizziness gets worse    Repeated vomiting    You can't be awakened   Date Last Reviewed: 10/18/2016    9201-8510 [employer]. [address] [place] [address], [place] [address]. All rights reserved. This information is not intended as a substitute for professional medical care. Always follow your healthcare professional's instructions.           Additional Information About Your Visit        [facility] Information     [employer] gives you secure  access to your electronic health record. If you see a primary care provider, you can also send messages to your care team and make appointments. If you have questions, please call your primary care clinic.  If you do not have a primary care provider, please call [phone] and they will assist you.        Care EveryWhere ID     This is your Care EveryWhere ID. This could be used by other organizations to access your [place] medical records  [id number]        Your Vitals Were     Blood Pressure Pulse Respirations Pulse Oximetry          115/79 60 16 100%         Primary Care Provider Office Phone # Fax #    Kevan Bull [phone]      Equal Access to Services     [facility] : Hadii girish ruffin hadcarmeno Socatherine, waaxda luqadaha, qaybta kaalmada sangeetayada, lakhwinder reis . So [facility] [phone].    ATENCIÓN: Si habla español, tiene a stewart disposición servicios gratuitos de asistencia lingüística. Llame al [phone].    We comply with applicable federal civil rights laws and [place] laws. We do not discriminate on the basis of race, color, national origin, age, disability, sex, sexual orientation, or gender identity.            Thank you!     Thank you for choosing [facility] for your care. Our goal is always to provide you with excellent care. Hearing back from our patients is one way we can continue to improve our services. Please take a few minutes to complete the written survey that you may receive in the mail after you visit. If you would like to speak to someone directly about your visit please contact Patient Relations at [phone]. Thank you!               Medication List: This is a list of all your medications and when to take them. Check marks below indicate your daily home schedule. Keep this list as a reference.      Medications           Morning Afternoon Evening Bedtime As Needed    amiodarone 200 MG tablet   Commonly known as:   PACERONE/CODARONE   400 mg BID 5/29-5/31. On 6/1 decrease to 200 mg BID. On 6/4 decrease to 100 mg daily                                apixaban ANTICOAGULANT 5 MG tablet   Commonly known as:  ELIQUIS   Take 1 tablet (5 mg) by mouth 2 times daily                                * levothyroxine 100 MCG tablet   Commonly known as:  SYNTHROID/LEVOTHROID   Take 100 mcg by mouth On Mon, Tue, Wed, Thur, Sat, Sun                                * levothyroxine 100 MCG tablet   Commonly known as:  SYNTHROID/LEVOTHROID   Take 50 mcg by mouth Once a week on Fri                                torsemide 5 MG tablet   Commonly known as:  DEMADEX   Take 1 tablet (5 mg) by mouth daily Down to 5mg daily                                TYLENOL PO   Take 500 mg by mouth every morning                                VITAMIN D (CHOLECALCIFEROL) PO   Take 1,000 Units by mouth daily                                * Notice:  This list has 2 medication(s) that are the same as other medications prescribed for you. Read the directions carefully, and ask your doctor or other care provider to review them with you.

## 2018-06-04 NOTE — TELEPHONE ENCOUNTER
Pt called and states that Cardioversion went well and he was off to [place] tomorrow and wondering what to do with his Amiodarone.  Pt taking 100 mg daily.  Per Rajni pt is to stay on this dose.  Have asked that pt call when he comes back from [place] in 3 weeks and then we can get in for a f/u.  Pt states understanding. JNEdwina

## 2018-06-04 NOTE — DISCHARGE INSTRUCTIONS
Recovery After Procedural Sedation (Adult)  You have been given medicine by vein to make you sleep during your surgery. This may have included both a pain medicine and sleeping medicine. Most of the effects have worn off. But you may still have some drowsiness for the next 6 to 8 hours.  Home care  Follow these guidelines when you get home:    For the next 8 hours, you should be watched by a responsible adult. This person should make sure your condition is not getting worse.    Don't drink any alcohol for the next 24 hours.    Don't drive, operate dangerous machinery, or make important business or personal decisions during the next 24 hours.  Note: Your healthcare provider may tell you not to take any medicine by mouth for pain or sleep in the next 4 hours. These medicines may react with the medicines you were given in the hospital. This could cause a much stronger response than usual.  Follow-up care  Follow up with your healthcare provider if you are not alert and back to your usual level of activity within 12 hours.  When to seek medical advice  Call your healthcare provider right away if any of these occur:    Drowsiness gets worse    Weakness or dizziness gets worse    Repeated vomiting    You can't be awakened   Date Last Reviewed: 10/18/2016    7013-6635 [employer]. [address] [place] [address], [place] [address]. All rights reserved. This information is not intended as a substitute for professional medical care. Always follow your healthcare professional's instructions.

## 2018-06-04 NOTE — ANESTHESIA CARE TRANSFER NOTE
Patient: Misael Fischer    Procedure(s):  ANESTHESIA CARDIOVERSION     Diagnosis: A-Fib  Diagnosis Additional Information: No value filed.    Anesthesia Type:   MAC     Note:  Airway :Nasal Cannula  Patient transferred to:Telemetry/Step Down Unit  Comments: VSS, RN present for recovery, pads removed while sleeping.Handoff Report: Identifed the Patient, Identified the Reponsible Provider, Reviewed the pertinent medical history, Discussed the surgical course, Reviewed Intra-OP anesthesia mangement and issues during anesthesia, Set expectations for post-procedure period and Allowed opportunity for questions and acknowledgement of understanding      Vitals: (Last set prior to Anesthesia Care Transfer)    CRNA VITALS  6/4/2018 1110 - 6/4/2018 1147      6/4/2018             EKG: Sinus bradycardia                Electronically Signed By: KALIN Chilel CRNA  June 4, 2018  11:40 AM

## 2018-06-07 DIAGNOSIS — I36.1 NON-RHEUMATIC TRICUSPID VALVE INSUFFICIENCY: ICD-10-CM

## 2018-06-07 LAB — INTERPRETATION ECG - MUSE: NORMAL

## 2018-06-07 RX ORDER — TORSEMIDE 5 MG/1
5 TABLET ORAL DAILY
Qty: 90 TABLET | Refills: 2 | Status: SHIPPED | OUTPATIENT
Start: 2018-04-03 | End: 2019-01-16

## 2018-06-12 ENCOUNTER — TELEPHONE (OUTPATIENT)
Dept: CARDIOLOGY | Facility: CLINIC | Age: 73
End: 2018-06-12

## 2018-06-12 NOTE — TELEPHONE ENCOUNTER
Received script clarification regarding Torsemide prescription from [facility] pharmacy [place]. Need to verify if pt is taking Torsemide 5 mg (1-2 tablets PRN) or pt takes Torsemide 5 mg daily (can take 2 tablets if needed). Called patient and left a VM for callback. Pt is currently in [place] (?). Reviewed w/  but would like to know what pt has been doing at home.     Awaiting callback. CLIFFORD Miranda

## 2018-06-25 ENCOUNTER — OFFICE VISIT (OUTPATIENT)
Dept: CARDIOLOGY | Facility: CLINIC | Age: 73
End: 2018-06-25
Attending: PHYSICIAN ASSISTANT
Payer: MEDICARE

## 2018-06-25 VITALS
HEART RATE: 67 BPM | BODY MASS INDEX: 22.73 KG/M2 | SYSTOLIC BLOOD PRESSURE: 143 MMHG | HEIGHT: 68 IN | WEIGHT: 150 LBS | DIASTOLIC BLOOD PRESSURE: 93 MMHG

## 2018-06-25 DIAGNOSIS — I48.19 PERSISTENT ATRIAL FIBRILLATION (H): ICD-10-CM

## 2018-06-25 DIAGNOSIS — R42 DIZZINESS: ICD-10-CM

## 2018-06-25 LAB
ANION GAP SERPL CALCULATED.3IONS-SCNC: 10.4 MMOL/L (ref 6–17)
BUN SERPL-MCNC: 17 MG/DL (ref 7–30)
CALCIUM SERPL-MCNC: 9.1 MG/DL (ref 8.5–10.5)
CHLORIDE SERPL-SCNC: 96 MMOL/L (ref 98–107)
CO2 SERPL-SCNC: 26 MMOL/L (ref 23–29)
CREAT SERPL-MCNC: 1.34 MG/DL (ref 0.7–1.3)
GFR SERPL CREATININE-BSD FRML MDRD: 52 ML/MIN/1.7M2
GLUCOSE SERPL-MCNC: 88 MG/DL (ref 70–105)
POTASSIUM SERPL-SCNC: 5.4 MMOL/L (ref 3.5–5.1)
SODIUM SERPL-SCNC: 127 MMOL/L (ref 136–145)

## 2018-06-25 PROCEDURE — 80048 BASIC METABOLIC PNL TOTAL CA: CPT | Performed by: INTERNAL MEDICINE

## 2018-06-25 PROCEDURE — 99214 OFFICE O/P EST MOD 30 MIN: CPT | Mod: 25 | Performed by: INTERNAL MEDICINE

## 2018-06-25 PROCEDURE — 36415 COLL VENOUS BLD VENIPUNCTURE: CPT | Performed by: INTERNAL MEDICINE

## 2018-06-25 PROCEDURE — 93000 ELECTROCARDIOGRAM COMPLETE: CPT | Performed by: INTERNAL MEDICINE

## 2018-06-25 NOTE — LETTER
6/25/2018    Kevan Bull MD  [address]  [facility] [address]    RE: Misael Fischer       Dear Colleague,    I had the pleasure of seeing Misael Fischer in the [facility].    HPI and Plan:   See dictation  [id number]  Orders Placed This Encounter   Procedures     Basic metabolic panel     EKG 12-lead complete w/read - Clinics (performed today)       No orders of the defined types were placed in this encounter.      There are no discontinued medications.      Encounter Diagnoses   Name Primary?     Dizziness      Persistent atrial fibrillation (H)        CURRENT MEDICATIONS:  Current Outpatient Prescriptions   Medication Sig Dispense Refill     Acetaminophen (TYLENOL PO) Take 500 mg by mouth every morning       amiodarone (PACERONE/CODARONE) 200 MG tablet 400 mg BID 5/29-5/31. On 6/1 decrease to 200 mg BID. On 6/4 decrease to 100 mg daily       apixaban ANTICOAGULANT (ELIQUIS) 5 MG tablet Take 1 tablet (5 mg) by mouth 2 times daily 180 tablet 3     levothyroxine (SYNTHROID/LEVOTHROID) 100 MCG tablet Take 100 mcg by mouth On Mon, Tue, Wed, Thur, Sat, Sun       torsemide (DEMADEX) 5 MG tablet Take 1 tablet (5 mg) by mouth daily 1-2 tab daily. 90 tablet 2     VITAMIN D, CHOLECALCIFEROL, PO Take 1,000 Units by mouth daily        levothyroxine (SYNTHROID/LEVOTHROID) 100 MCG tablet Take 50 mcg by mouth Once a week on Fri         ALLERGIES     Allergies   Allergen Reactions     Ace Inhibitors      Due to CKD, renal insufficiency       PAST MEDICAL HISTORY:  Past Medical History:   Diagnosis Date     Atrial flutter (H) 11/23/15     AVNRT (AV jade re-entry tachycardia) (H)     status post     BPH (benign prostatic hyperplasia)      CAD (coronary artery disease)     Abnormal Nuc 2014     Cardiomyopathy (H)     Echo 11/2015- EF 35-40%     CHF (congestive heart failure) (H) 11/25/2015    Echo 11/2015- EF 35-40%      CKD (chronic kidney disease)     not on ACE/ARB due to insufficiency      Elevated PSA      Hypercholesterolemia 7/10/2014     Malaria      Mild pulmonic regurgitation and RV dysfunction by prior echocardiogram      Non-rheumatic tricuspid valve insufficiency      Paroxysmal a-fib (H)      Persistent atrial fibrillation with rapid ventricular response (H)      Pigment deposition      Systolic heart failure (H)     Echo 11/2015- EF 35-40%     Tricuspid regurgitation     Mod 2016       PAST SURGICAL HISTORY:  Past Surgical History:   Procedure Laterality Date     ANESTHESIA CARDIOVERSION N/A 12/6/2016    Procedure: ANESTHESIA CARDIOVERSION;  Surgeon: GENERIC ANESTHESIA PROVIDER;  Location: [facility] OR     ANESTHESIA CARDIOVERSION N/A 6/4/2018    Procedure: ANESTHESIA CARDIOVERSION;  ANESTHESIA CARDIOVERSION ;  Surgeon: GENERIC ANESTHESIA PROVIDER;  Location: [facility] OR     C KP (TRANSESOPHAGEAL ECHO)  12/6/16     CARDIOVERSION  12/14/15     H ABLATION ATRIAL FLUTTER  3/21/16     ORTHOPEDIC SURGERY Left 1957    foot injury repair       FAMILY HISTORY:  Family History   Problem Relation Age of Onset     C.A.D. Mother      Prostate Cancer Father 73       SOCIAL HISTORY:  Social History     Social History     Marital status:      Spouse name: N/A     Number of children: N/A     Years of education: N/A     Social History Main Topics     Smoking status: Never Smoker     Smokeless tobacco: Never Used     Alcohol use Yes      Comment: rare     Drug use: No     Sexual activity: Not Currently     Other Topics Concern     Caffeine Concern No     Occupational Exposure No     none     Sleep Concern No     Stress Concern No     Weight Concern No     Special Diet Yes     low salt     Exercise Yes     walking     Seat Belt Yes     Social History Narrative       Review of Systems:  Skin:  Positive for itching dry itchy skin   Eyes:  Positive for glasses    ENT:  Negative epistaxis nosebleeds, daily when blowing nose when first getting up in the morning  Respiratory:  Positive for       Cardiovascular:   "Negative for;Negative      Gastroenterology: Negative for melena;hematochezia No melenta/hematochezia  Genitourinary:  Negative hesitancy;retention;prostate problem very small blood spots in under pants  Musculoskeletal:  Positive for joint pain has worsened  Neurologic:  Negative      Psychiatric:  Negative      Heme/Lymph/Imm:  Positive for allergies RX  Endocrine:  Positive for cold intolerance;thyroid disorder last TSH was elevated; more sensitive to cold    Physical Exam:  Vitals: BP (!) 143/93  Pulse 67  Ht 1.727 m (5' 7.99\")  Wt 68 kg (150 lb)  BMI 22.81 kg/m2    Constitutional:  cooperative, alert and oriented, well developed, well nourished, in no acute distress        Skin:  warm and dry to the touch, no apparent skin lesions or masses noted          Head:  normocephalic, no masses or lesions        Eyes:  pupils equal and round, conjunctivae and lids unremarkable, sclera white, no xanthalasma, EOMS intact, no nystagmus        Lymph:No Cervical lymphadenopathy present     ENT:           Neck:  carotid pulses are full and equal bilaterally, JVP normal, no carotid bruit        Respiratory:  normal breath sounds, clear to auscultation, normal A-P diameter, normal symmetry, normal respiratory excursion, no use of accessory muscles         Cardiac: regular rhythm, normal S1/S2, no S3 or S4, apical impulse not displaced, no murmurs, gallops or rubs       systolic murmur                                                 GI:  abdomen soft, non-tender, BS normoactive, no mass, no HSM, no bruits        Extremities and Muscular Skeletal:  no deformities, clubbing, cyanosis, erythema observed              Neurological:  no gross motor deficits        Psych:  Alert and Oriented x 3        CC  Stephanie Jaimes, WOODY  [address]  [place] [address]                Thank you for allowing me to participate in the care of your patient.      Sincerely,     Ivan Anderson MD     [facility] " Heart Care    cc:   Stephanie Jaimes, WOODY  [address]  [place] [id number]

## 2018-06-25 NOTE — MR AVS SNAPSHOT
After Visit Summary   6/25/2018    Misael Fischer    MRN: [id number]           Patient Information     Date Of Birth          1945        Visit Information        Provider Department      6/25/2018 3:15 PM Ivan Larkin MD [facility]        Today's Diagnoses     Dizziness        Persistent atrial fibrillation (H)           Follow-ups after your visit        Additional Services     Follow-Up with Cardiac Advanced Practice Provider                 Future tests that were ordered for you today     Open Future Orders        Priority Expected Expires Ordered    Follow-Up with Cardiac Advanced Practice Provider Routine 12/22/2018 6/25/2019 6/25/2018            Who to contact     If you have questions or need follow up information about today's clinic visit or your schedule please contact [facility] directly at [phone].  Normal or non-critical lab and imaging results will be communicated to you by MyChart, letter or phone within 4 business days after the clinic has received the results. If you do not hear from us within 7 days, please contact the clinic through MyChart or phone. If you have a critical or abnormal lab result, we will notify you by phone as soon as possible.  Submit refill requests through [employer] or call your pharmacy and they will forward the refill request to us. Please allow 3 business days for your refill to be completed.          Additional Information About Your Visit        MyChart Information     [employer] gives you secure access to your electronic health record. If you see a primary care provider, you can also send messages to your care team and make appointments. If you have questions, please call your primary care clinic.  If you do not have a primary care provider, please call [phone] and they will assist you.        Care EveryWhere ID     This is your Care EveryWhere ID. This could be used by  "other organizations to access your [place] medical records  [id number]        Your Vitals Were     Pulse Height BMI (Body Mass Index)             67 1.727 m (5' 7.99\") 22.81 kg/m2          Blood Pressure from Last 3 Encounters:   06/25/18 (!) 143/93   06/04/18 119/88   05/29/18 130/88    Weight from Last 3 Encounters:   06/25/18 68 kg (150 lb)   05/29/18 70.3 kg (155 lb)   02/17/18 71.9 kg (158 lb 8 oz)              We Performed the Following     EKG 12-lead complete w/read - Clinics (performed today)     Follow-Up with Electrophysiologist        Primary Care Provider Office Phone # Fax #    Kevan Bull [phone] [address]  [facility] [address]        Equal Access to Services     [facility]: Hadii aad ku hadasho Soomaali, waaxda luqadaha, qaybta kaalmada adeegyada, waxay ambrocioin hayaan sangeeta reis . So [facility] [phone].    ATENCIÓN: Si habla español, tiene a stewart disposición servicios gratuitos de asistencia lingüística. Roxanne al [phone].    We comply with applicable federal civil rights laws and [place] laws. We do not discriminate on the basis of race, color, national origin, age, disability, sex, sexual orientation, or gender identity.            Thank you!     Thank you for choosing [facility] HEART [facility]  for your care. Our goal is always to provide you with excellent care. Hearing back from our patients is one way we can continue to improve our services. Please take a few minutes to complete the written survey that you may receive in the mail after your visit with us. Thank you!             Your Updated Medication List - Protect others around you: Learn how to safely use, store and throw away your medicines at www.disposemymeds.org.          This list is accurate as of 6/25/18  4:56 PM.  Always use your most recent med list.                   Brand Name Dispense Instructions for use Diagnosis    amiodarone 200 MG tablet "    PACERONE/CODARONE     400 mg BID 5/29-5/31. On 6/1 decrease to 200 mg BID. On 6/4 decrease to 100 mg daily    Persistent atrial fibrillation (H)       apixaban ANTICOAGULANT 5 MG tablet    ELIQUIS    180 tablet    Take 1 tablet (5 mg) by mouth 2 times daily    Persistent atrial fibrillation (H)       * levothyroxine 100 MCG tablet    SYNTHROID/LEVOTHROID     Take 100 mcg by mouth On Mon, Tue, Wed, Thur, Sat, Sun        * levothyroxine 100 MCG tablet    SYNTHROID/LEVOTHROID     Take 50 mcg by mouth Once a week on Fri        torsemide 5 MG tablet    DEMADEX    90 tablet    Take 1 tablet (5 mg) by mouth daily 1-2 tab daily.    Non-rheumatic tricuspid valve insufficiency       TYLENOL PO      Take 500 mg by mouth every morning        VITAMIN D (CHOLECALCIFEROL) PO      Take 1,000 Units by mouth daily        * Notice:  This list has 2 medication(s) that are the same as other medications prescribed for you. Read the directions carefully, and ask your doctor or other care provider to review them with you.

## 2018-06-25 NOTE — PROGRESS NOTES
HPI and Plan:   See dictation  [id number]  Orders Placed This Encounter   Procedures     Basic metabolic panel     EKG 12-lead complete w/read - Clinics (performed today)       No orders of the defined types were placed in this encounter.      There are no discontinued medications.      Encounter Diagnoses   Name Primary?     Dizziness      Persistent atrial fibrillation (H)        CURRENT MEDICATIONS:  Current Outpatient Prescriptions   Medication Sig Dispense Refill     Acetaminophen (TYLENOL PO) Take 500 mg by mouth every morning       amiodarone (PACERONE/CODARONE) 200 MG tablet 400 mg BID 5/29-5/31. On 6/1 decrease to 200 mg BID. On 6/4 decrease to 100 mg daily       apixaban ANTICOAGULANT (ELIQUIS) 5 MG tablet Take 1 tablet (5 mg) by mouth 2 times daily 180 tablet 3     levothyroxine (SYNTHROID/LEVOTHROID) 100 MCG tablet Take 100 mcg by mouth On Mon, Tue, Wed, Thur, Sat, Sun       torsemide (DEMADEX) 5 MG tablet Take 1 tablet (5 mg) by mouth daily 1-2 tab daily. 90 tablet 2     VITAMIN D, CHOLECALCIFEROL, PO Take 1,000 Units by mouth daily        levothyroxine (SYNTHROID/LEVOTHROID) 100 MCG tablet Take 50 mcg by mouth Once a week on Fri         ALLERGIES     Allergies   Allergen Reactions     Ace Inhibitors      Due to CKD, renal insufficiency       PAST MEDICAL HISTORY:  Past Medical History:   Diagnosis Date     Atrial flutter (H) 11/23/15     AVNRT (AV jade re-entry tachycardia) (H)     status post     BPH (benign prostatic hyperplasia)      CAD (coronary artery disease)     Abnormal Nuc 2014     Cardiomyopathy (H)     Echo 11/2015- EF 35-40%     CHF (congestive heart failure) (H) 11/25/2015    Echo 11/2015- EF 35-40%      CKD (chronic kidney disease)     not on ACE/ARB due to insufficiency     Elevated PSA      Hypercholesterolemia 7/10/2014     Malaria      Mild pulmonic regurgitation and RV dysfunction by prior echocardiogram      Non-rheumatic tricuspid valve insufficiency      Paroxysmal a-fib (H)       Persistent atrial fibrillation with rapid ventricular response (H)      Pigment deposition      Systolic heart failure (H)     Echo 11/2015- EF 35-40%     Tricuspid regurgitation     Mod 2016       PAST SURGICAL HISTORY:  Past Surgical History:   Procedure Laterality Date     ANESTHESIA CARDIOVERSION N/A 12/6/2016    Procedure: ANESTHESIA CARDIOVERSION;  Surgeon: GENERIC ANESTHESIA PROVIDER;  Location: [facility] OR     ANESTHESIA CARDIOVERSION N/A 6/4/2018    Procedure: ANESTHESIA CARDIOVERSION;  ANESTHESIA CARDIOVERSION ;  Surgeon: GENERIC ANESTHESIA PROVIDER;  Location: [facility] OR     C KP (TRANSESOPHAGEAL ECHO)  12/6/16     CARDIOVERSION  12/14/15     H ABLATION ATRIAL FLUTTER  3/21/16     ORTHOPEDIC SURGERY Left 1957    foot injury repair       FAMILY HISTORY:  Family History   Problem Relation Age of Onset     C.A.D. Mother      Prostate Cancer Father 73       SOCIAL HISTORY:  Social History     Social History     Marital status:      Spouse name: N/A     Number of children: N/A     Years of education: N/A     Social History Main Topics     Smoking status: Never Smoker     Smokeless tobacco: Never Used     Alcohol use Yes      Comment: rare     Drug use: No     Sexual activity: Not Currently     Other Topics Concern     Caffeine Concern No     Occupational Exposure No     none     Sleep Concern No     Stress Concern No     Weight Concern No     Special Diet Yes     low salt     Exercise Yes     walking     Seat Belt Yes     Social History Narrative       Review of Systems:  Skin:  Positive for itching dry itchy skin   Eyes:  Positive for glasses    ENT:  Negative epistaxis nosebleeds, daily when blowing nose when first getting up in the morning  Respiratory:  Positive for       Cardiovascular:  Negative for;Negative      Gastroenterology: Negative for melena;hematochezia No melenta/hematochezia  Genitourinary:  Negative hesitancy;retention;prostate problem very small blood spots in under pants  Musculoskeletal:   "Positive for joint pain has worsened  Neurologic:  Negative      Psychiatric:  Negative      Heme/Lymph/Imm:  Positive for allergies RX  Endocrine:  Positive for cold intolerance;thyroid disorder last TSH was elevated; more sensitive to cold    Physical Exam:  Vitals: BP (!) 143/93  Pulse 67  Ht 1.727 m (5' 7.99\")  Wt 68 kg (150 lb)  BMI 22.81 kg/m2    Constitutional:  cooperative, alert and oriented, well developed, well nourished, in no acute distress        Skin:  warm and dry to the touch, no apparent skin lesions or masses noted          Head:  normocephalic, no masses or lesions        Eyes:  pupils equal and round, conjunctivae and lids unremarkable, sclera white, no xanthalasma, EOMS intact, no nystagmus        Lymph:No Cervical lymphadenopathy present     ENT:           Neck:  carotid pulses are full and equal bilaterally, JVP normal, no carotid bruit        Respiratory:  normal breath sounds, clear to auscultation, normal A-P diameter, normal symmetry, normal respiratory excursion, no use of accessory muscles         Cardiac: regular rhythm, normal S1/S2, no S3 or S4, apical impulse not displaced, no murmurs, gallops or rubs       systolic murmur                                                 GI:  abdomen soft, non-tender, BS normoactive, no mass, no HSM, no bruits        Extremities and Muscular Skeletal:  no deformities, clubbing, cyanosis, erythema observed              Neurological:  no gross motor deficits        Psych:  Alert and Oriented x 3        CC  Stephanie Jaimes PA-C  [address]  [place] [address]              "

## 2018-06-25 NOTE — LETTER
6/25/2018      Kevan Bull MD  [address]  [facility] [address]      RE: Misael Fischer       Dear Colleague,    I had the pleasure of seeing Misael Fischer in the [facility].    Service Date: 06/25/2018      HISTORY OF PRESENT ILLNESS:  Thank you for allowing me to participate in the care of your very delightful patient.  As you know, Mr. Fischer is a 72-year-old gentleman with a history of atrial flutter, atrial fibrillation and PVCs in the background of severe LV dysfunction due to severe TR and had LV dysfunction as well with EF as low as 20% in the past.        The patient underwent EP study and had successfully ablation of his typical atrial flutter and also induced AVNRT.  At that time, he did not have many PVCs and moreover the PVC had different morphologies.  I elected to observe him at that time.      I saw him this past December.  At that time, the patient was feeling more fatigued and tired.  He already had thyroid toxicity from amiodarone and was on replacement.  Because of his symptoms, I elected to stop the amiodarone altogether rather than continue with a low dose of amiodarone along with beta blocker.  The patient did see a heart failure specialist over at the [facility], and apparently no further management was needed.      I was made aware of Mr. Fischer before his medical mission to [place].  At that time, we were to arrange for DC cardioversion and load him with amiodarone.  Fortunately, he did not have any medical issues when he was in [place] and was doing quite well without even the need of taking the Demadex.  I would like to check his creatinine today, as his chronic renal insufficiency was getting worse this past February.  I asked him to go back to Demadex now given his right-sided heart failure and will continue with the current dose of amiodarone at 100 mg a day.  I will have the patient come back to see Rajni Jaimes, one of our advanced  practice providers, in 6 months' time.      cc:   Kevan Bull MD    [facility] [address] [place]  [address]         KHADAR CALVERT MD             D: 2018   T: 2018   MT: SENA      Name:     TREY BURGER   MRN:      [id number]        Account:      [id number]   :      1945           Service Date: 2018      Document: [id number]           Outpatient Encounter Prescriptions as of 2018   Medication Sig Dispense Refill     Acetaminophen (TYLENOL PO) Take 500 mg by mouth every morning       amiodarone (PACERONE/CODARONE) 200 MG tablet 400 mg BID -. On  decrease to 200 mg BID. On  decrease to 100 mg daily       apixaban ANTICOAGULANT (ELIQUIS) 5 MG tablet Take 1 tablet (5 mg) by mouth 2 times daily 180 tablet 3     levothyroxine (SYNTHROID/LEVOTHROID) 100 MCG tablet Take 100 mcg by mouth On Mon, Tue, Wed, Thur, Sat, Sun       torsemide (DEMADEX) 5 MG tablet Take 1 tablet (5 mg) by mouth daily 1-2 tab daily. 90 tablet 2     VITAMIN D, CHOLECALCIFEROL, PO Take 1,000 Units by mouth daily        levothyroxine (SYNTHROID/LEVOTHROID) 100 MCG tablet Take 50 mcg by mouth Once a week on Fri       No facility-administered encounter medications on file as of 2018.                Again, thank you for allowing me to participate in the care of your patient.      Sincerely,    Khadar Anderson MD     [facility]

## 2018-06-26 NOTE — PROGRESS NOTES
Service Date: 06/25/2018      HISTORY OF PRESENT ILLNESS:  Thank you for allowing me to participate in the care of your very delightful patient.  As you know, Mr. Fischer is a 72-year-old gentleman with a history of atrial flutter, atrial fibrillation and PVCs in the background of severe LV dysfunction due to severe TR and had LV dysfunction as well with EF as low as 20% in the past.        The patient underwent EP study and had successfully ablation of his typical atrial flutter and also induced AVNRT.  At that time, he did not have many PVCs and moreover the PVC had different morphologies.  I elected to observe him at that time.      I saw him this past December.  At that time, the patient was feeling more fatigued and tired.  He already had thyroid toxicity from amiodarone and was on replacement.  Because of his symptoms, I elected to stop the amiodarone altogether rather than continue with a low dose of amiodarone along with beta blocker.  The patient did see a heart failure specialist over at the [facility], and apparently no further management was needed.      I was made aware of Mr. Fischer before his medical mission to [place].  At that time, we were to arrange for DC cardioversion and load him with amiodarone.  Fortunately, he did not have any medical issues when he was in [place] and was doing quite well without even the need of taking the Demadex.  I would like to check his creatinine today, as his chronic renal insufficiency was getting worse this past February.  I asked him to go back to Demadex now given his right-sided heart failure and will continue with the current dose of amiodarone at 100 mg a day.  I will have the patient come back to see Rajni Jaimes, one of our advanced practice providers, in 6 months' time.      cc:   Kevan Bull MD    [facility] [address] [place]  [address]         KHADAR CALVERT MD             D: 06/25/2018   T: 06/26/2018   MT:  LJ      Name:     TREY BURGER   MRN:      [id number]        Account:      [id number]   :      1945           Service Date: 2018      Document: [id number]

## 2018-07-09 ENCOUNTER — TRANSFERRED RECORDS (OUTPATIENT)
Dept: HEALTH INFORMATION MANAGEMENT | Facility: CLINIC | Age: 73
End: 2018-07-09

## 2018-07-09 LAB — TSH SERPL-ACNC: 0.6 UIU/ML (ref 0.3–5)

## 2018-07-17 ENCOUNTER — DOCUMENTATION ONLY (OUTPATIENT)
Dept: CARDIOLOGY | Facility: CLINIC | Age: 73
End: 2018-07-17

## 2018-07-17 NOTE — PROGRESS NOTES
Caller: salma Rodriguez      Callback info: [phone]    Reason for call: Status of Hydrocodone-Acetaminophen 5-325 mg.  Patient is out of medication as of yesterday, 5/17/18.  Please e-scribe medication or call for a  to  the paper prescription.   Patient called in the other day to request Dr. Larkin to wrote a letter to the airlines requesting a refund for his [place] 3 rd ticket from [place] to [place]. Patient developed symptoms while in [place] on June 1 st and was advised to fly back Saturday so that he could undergo cardioversion . I composed the letter today ( in [facility]) and Dr. Larkin signed. I called patient to let him know this was done and he requested the letter ot be mailed to him. It will be mailed tomorrow. He expressed appreciation for the help. Rober

## 2018-09-17 ENCOUNTER — TELEPHONE (OUTPATIENT)
Dept: CARDIOLOGY | Facility: CLINIC | Age: 73
End: 2018-09-17

## 2018-09-17 DIAGNOSIS — I48.19 PERSISTENT ATRIAL FIBRILLATION (H): ICD-10-CM

## 2018-09-17 DIAGNOSIS — I48.0 PAROXYSMAL ATRIAL FIBRILLATION (H): Primary | ICD-10-CM

## 2018-09-17 DIAGNOSIS — I48.0 PAROXYSMAL ATRIAL FIBRILLATION (H): ICD-10-CM

## 2018-09-17 PROCEDURE — 93000 ELECTROCARDIOGRAM COMPLETE: CPT | Performed by: INTERNAL MEDICINE

## 2018-09-17 RX ORDER — AMIODARONE HYDROCHLORIDE 200 MG/1
200 TABLET ORAL
Qty: 60 TABLET | Status: ON HOLD | COMMUNITY
Start: 2018-09-17 | End: 2018-09-25

## 2018-09-17 NOTE — TELEPHONE ENCOUNTER
REQUEST FOR SAMPLES    Medication: Eliquis   Dose requested: 5 mg    Reason: Waiting for mail order supply     Quantity: 2 boxes     Samples are available for  at our  in [place]. A copy of patient's medication list was included. TGablessing HORTON

## 2018-09-17 NOTE — TELEPHONE ENCOUNTER
Pt calling stating that he believes he has been in A Fib for the last few days.  Pt can not tell what his rate is, but states that he has been lightheaded.  Will have pt get an EKG at 1145 in [place] today.  Monica

## 2018-09-17 NOTE — TELEPHONE ENCOUNTER
EKG reviewed with Dr Larkin.  Pt in A Fib rate 76 bpm. Pt is to increase his Amiodarone to 200 mg tid for 1 week then 200 mg daily.  Pt to have a cardioversion after one week. Pt is aware and will contacted by scheduling to arrange Cardioversion. Pt will need an H & P also as last OV was 6/25. Order for Cardioversion placed. DEBRAelsonCLIFFORD

## 2018-09-20 ENCOUNTER — OFFICE VISIT (OUTPATIENT)
Dept: FAMILY MEDICINE | Facility: CLINIC | Age: 73
End: 2018-09-20

## 2018-09-20 VITALS
WEIGHT: 148.8 LBS | OXYGEN SATURATION: 98 % | DIASTOLIC BLOOD PRESSURE: 94 MMHG | TEMPERATURE: 97.3 F | RESPIRATION RATE: 16 BRPM | SYSTOLIC BLOOD PRESSURE: 130 MMHG | BODY MASS INDEX: 22.63 KG/M2 | HEART RATE: 45 BPM

## 2018-09-20 DIAGNOSIS — E03.2 HYPOTHYROIDISM DUE TO MEDICATION: ICD-10-CM

## 2018-09-20 DIAGNOSIS — R53.83 FATIGUE, UNSPECIFIED TYPE: Primary | ICD-10-CM

## 2018-09-20 DIAGNOSIS — R07.0 THROAT PAIN: ICD-10-CM

## 2018-09-20 DIAGNOSIS — I48.20 CHRONIC ATRIAL FIBRILLATION (H): ICD-10-CM

## 2018-09-20 LAB
% GRANULOCYTES: 84.5 % (ref 42.2–75.2)
HCT VFR BLD AUTO: 48.6 % (ref 39–51)
HEMOGLOBIN: 16 G/DL (ref 13.4–17.5)
LYMPHOCYTES NFR BLD AUTO: 12 % (ref 20.5–51.1)
MCH RBC QN AUTO: 30.8 PG (ref 27–31)
MCHC RBC AUTO-ENTMCNC: 32.9 G/DL (ref 33–37)
MCV RBC AUTO: 93.5 FL (ref 80–100)
MONOCYTES NFR BLD AUTO: 3.5 % (ref 1.7–9.3)
MONONUCLEOSIS SCREEN: NORMAL
PLATELET # BLD AUTO: 141 K/UL (ref 140–450)
RBC # BLD AUTO: 5.2 X10/CMM (ref 4.2–5.9)
WBC # BLD AUTO: 5.1 X10/CMM (ref 3.8–11)

## 2018-09-20 PROCEDURE — 99214 OFFICE O/P EST MOD 30 MIN: CPT | Performed by: FAMILY MEDICINE

## 2018-09-20 PROCEDURE — 86403 PARTICLE AGGLUT ANTBDY SCRN: CPT | Performed by: FAMILY MEDICINE

## 2018-09-20 PROCEDURE — 36415 COLL VENOUS BLD VENIPUNCTURE: CPT | Performed by: FAMILY MEDICINE

## 2018-09-20 PROCEDURE — 85025 COMPLETE CBC W/AUTO DIFF WBC: CPT | Performed by: FAMILY MEDICINE

## 2018-09-20 NOTE — MR AVS SNAPSHOT
After Visit Summary   9/20/2018    Misael Fischer    MRN: [id number]           Patient Information     Date Of Birth          1945        Visit Information        Provider Department      9/20/2018 12:45 PM Tamra Mullen MD [facility]        Today's Diagnoses     Fatigue, unspecified type    -  1    Throat pain        Hypothyroidism due to medication        Chronic atrial fibrillation (H)           Follow-ups after your visit        Your next 10 appointments already scheduled     Sep 25, 2018   Procedure with GENERIC ANESTHESIA PROVIDER   [facility] (--)    [address] [facility] [address]   [phone]            Sep 25, 2018 11:30 AM CDT   Cardioversion with RH PROCEDURE ROOM   [facility] ([facility])    [address] [facility] [address]   [phone]           1) NPO for 8 hours prior to the procedure except for sips of water with medications. 2) Hold insulin or oral diabetic medications morning of procedure. May take   dose long acting insulin. Follow further instructions of PMD. 3) Continue daily Coumadin. ~ If taking Digoxin, hold AM of procedure. ~ Plan to have a responsible adult take you home after the exam. You may not drive, take a bus or taxi by yourself. ~ A responsible adult must stay with you for 24 hours after the test.            Oct 02, 2018 10:00 AM CDT   Pulmonary Function with  Pulmonary Rehab 1   [facility] ([facility])    [address]  [place] [address] [phone]           No Inhalers for 6 hours prior to test No Smoking 2 hours prior to test            Oct 15, 2018  3:50 PM CDT   [facility] EP RETURN with Stephanie Jaimes PA-C   [facility] ([facility])    [address]  [place] [address] [phone] OPT 2            Dec 21, 2018 10:15 AM  CST   Return Visit with Prabhakar Castro MD   [facility] ([facility])    [address]  [facility] [address] [phone]              Who to contact     If you have questions or need follow up information about today's clinic visit or your schedule please contact [facility] directly at [phone].  Normal or non-critical lab and imaging results will be communicated to you by [employer], letter or phone within 4 business days after the clinic has received the results. If you do not hear from us within 7 days, please contact the clinic through [employer] or phone. If you have a critical or abnormal lab result, we will notify you by phone as soon as possible.  Submit refill requests through [employer] or call your pharmacy and they will forward the refill request to us. Please allow 3 business days for your refill to be completed.          Additional Information About Your Visit        [employer] Information     [employer] gives you secure access to your electronic health record. If you see a primary care provider, you can also send messages to your care team and make appointments. If you have questions, please call your primary care clinic.  If you do not have a primary care provider, please call [phone] and they will assist you.        Care EveryWhere ID     This is your Care EveryWhere ID. This could be used by other organizations to access your [facility] medical records  [id number]        Your Vitals Were     Pulse Temperature Respirations Pulse Oximetry BMI (Body Mass Index)       45 97.3  F (36.3  C) (Oral) 16 98% 22.63 kg/m2        Blood Pressure from Last 3 Encounters:   09/21/18 (!) 130/92   09/20/18 (!) 130/94   06/25/18 (!) 143/93    Weight from Last 3 Encounters:   09/21/18 67.6 kg (149 lb)   09/20/18 67.5 kg (148 lb 12.8 oz)   06/25/18 68 kg (150 lb)              We Performed the Following     CBC with Diff/Plt ([facility])     Comp.  Metabolic Panel (14) ([facility])     [place] ([facility])     TSH ([facility])        Primary Care Provider Office Phone # Fax #    Kevan Bull [phone] [address]  [facility] [address]        Equal Access to Services     [facility] RHETT : Hadii aad ku hadasho Soomaali, waaxda luqadaha, qaybta kaalmada adeegyada, waxay idiin hayaan adeeg anila lamuniran . So [facility] [phone].    ATENCIÓN: Si habla español, tiene a stewart disposición servicios gratuitos de asistencia lingüística. Noame al [phone].    We comply with applicable federal civil rights laws and [place] laws. We do not discriminate on the basis of race, color, national origin, age, disability, sex, sexual orientation, or gender identity.            Thank you!     Thank you for choosing [facility]  for your care. Our goal is always to provide you with excellent care. Hearing back from our patients is one way we can continue to improve our services. Please take a few minutes to complete the written survey that you may receive in the mail after your visit with us. Thank you!             Your Updated Medication List - Protect others around you: Learn how to safely use, store and throw away your medicines at www.disposemymeds.org.          This list is accurate as of 9/20/18 11:59 PM.  Always use your most recent med list.                   Brand Name Dispense Instructions for use Diagnosis    amiodarone 200 MG tablet    PACERONE/CODARONE    60 tablet    200 mg Taking three 200mg (600mg) until 9/25/18.  l200 mg tid for 1 week, then 200 mg daily    Persistent atrial fibrillation (H)       apixaban ANTICOAGULANT 5 MG tablet    ELIQUIS    180 tablet    Take 1 tablet (5 mg) by mouth 2 times daily    Persistent atrial fibrillation (H)       * levothyroxine 100 MCG tablet    SYNTHROID/LEVOTHROID     Take 88 mcg by mouth daily 88mcg daily.        * levothyroxine 100 MCG tablet    SYNTHROID/LEVOTHROID     Take 50 mcg by mouth  Once a week on Fri        torsemide 5 MG tablet    DEMADEX    90 tablet    Take 1 tablet (5 mg) by mouth daily 1-2 tab daily.    Non-rheumatic tricuspid valve insufficiency       TYLENOL PO      Take 500 mg by mouth every morning        VITAMIN D (CHOLECALCIFEROL) PO      Take 1,000 Units by mouth daily        * Notice:  This list has 2 medication(s) that are the same as other medications prescribed for you. Read the directions carefully, and ask your doctor or other care provider to review them with you.

## 2018-09-20 NOTE — PROGRESS NOTES
Problem(s) Oriented visit        SUBJECTIVE:                                                    Misael Ficsher is a 72 year old male who presents to clinic today for feeling poorly after being in [place] ([place] and [place]). He arrived home 4 days ago. He began feeling poorly about 10 days prior to that. He has had Malaria many times, and at onset this time he had sore throat, joint aches but without fever, and took Malaria treatment right away. The treatment is something that he gets from [place]. He still has sore throat, joint aches have resolve, he feels a bit woozy. His appetite is poor, he is very tired. He has been sleeping well at night, wakes feeling fine, but feels physically tired after a few hours. No vomiting or diarrhea. No abdominal pain. He admits to not drinking much water. He is on a diuretic and has increased his torsemide dose to 10 mg over the last few days.    He has history of arrhythmia and has cardioversion scheduled for next week. He checks his weight daily, felt on target upon returning home, but then gained a pound and increased the torsemide. He recently increased his amiodarone significantly in preparation for the cardioversion.    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       Problem list, Medication list, Allergies, and Medical/Social/Surgical histories reviewed in [facility] and updated as appropriate.   Additional history: as documented    ROS:  10 point ROS completed and negative except noted above, including Gen, HEENT, CV, Resp, GI, , MS, Neurologic, Psych      Histories:   Patient Active Problem List   Diagnosis     Pigment deposition     Elevated PSA     BPH (benign prostatic hyperplasia)     Chronic kidney disease     Health Care Home     Hypercholesterolemia     Family history of prostate cancer     HTN (hypertension)     CAD (coronary artery disease)     CHF (congestive heart failure) (H)     Cardiomyopathy (H)     Atrial flutter (H)      Tricuspid regurgitation     Dizziness     Past Surgical History:   Procedure Laterality Date     ANESTHESIA CARDIOVERSION N/A 12/6/2016    Procedure: ANESTHESIA CARDIOVERSION;  Surgeon: GENERIC ANESTHESIA PROVIDER;  Location: [facility] OR     ANESTHESIA CARDIOVERSION N/A 6/4/2018    Procedure: ANESTHESIA CARDIOVERSION;  ANESTHESIA CARDIOVERSION ;  Surgeon: GENERIC ANESTHESIA PROVIDER;  Location: [facility] OR     C KP (TRANSESOPHAGEAL ECHO)  12/6/16     CARDIOVERSION  12/14/15     H ABLATION ATRIAL FLUTTER  3/21/16     ORTHOPEDIC SURGERY Left 1957    foot injury repair       Social History   Substance Use Topics     Smoking status: Never Smoker     Smokeless tobacco: Never Used     Alcohol use Yes      Comment: rare     Family History   Problem Relation Age of Onset     C.A.D. Mother      Prostate Cancer Father 73           OBJECTIVE:                                                    BP (!) 130/94  Pulse (!) 45  Temp 97.3  F (36.3  C) (Oral)  Resp 16  Wt 67.5 kg (148 lb 12.8 oz)  SpO2 98%  BMI 22.63 kg/m2  Body mass index is 22.63 kg/(m^2).   GENERAL APPEARANCE: Alert, no acute distress  EYES: PERRL, EOM normal, conjunctiva and lids normal  HENT: Ears and TMs normal, oral mucosa and posterior oropharynx normal  NECK: No adenopathy,masses or thyromegaly  RESP: lungs clear to auscultation   CV: irregularly irregular, no murmur or gallop  ABDOMEN: soft, no organomegaly, masses or tenderness  MS: extremities normal, no peripheral edema  NEURO: Alert, oriented, speech and mentation normal  PSYCH: mentation appears normal, affect and mood normal   Labs Resulted Today:   Results for orders placed or performed in visit on 09/20/18   Comp. Metabolic Panel (14) ([facility])   Result Value Ref Range    Glucose 97 65 - 99 mg/dL    Urea Nitrogen 19 8 - 27 mg/dL    Creatinine 1.91 (H) 0.76 - 1.27 mg/dL    eGFR If NonAfricn Am 34 (L) >59 mL/min/1.73    eGFR If Africn Am 39 (L) >59 mL/min/1.73    BUN/Creatinine Ratio 10 10 - 24    Sodium 131  (L) 134 - 144 mmol/L    Potassium 5.1 3.5 - 5.2 mmol/L    Chloride 92 (L) 96 - 106 mmol/L    Total CO2 21 20 - 29 mmol/L    Calcium 9.3 8.6 - 10.2 mg/dL    Protein Total 6.6 6.0 - 8.5 g/dL    Albumin 4.6 3.5 - 4.8 g/dL    Globulin, Total 2.0 1.5 - 4.5 g/dL    A/G Ratio 2.3 (H) 1.2 - 2.2    Bilirubin Total 1.1 0.0 - 1.2 mg/dL    Alkaline Phosphatase 95 39 - 117 IU/L    AST 25 0 - 40 IU/L    ALT 17 0 - 44 IU/L    Narrative    Performed at:  01 - [facility] [address], [place], [place]  [id number]  : Omid Sim MD, Phone:  [phone]   CBC with Diff/Plt (G)   Result Value Ref Range    WBC x10/cmm 5.1 3.8 - 11.0 x10/cmm    % Lymphocytes 12.0 (A) 20.5 - 51.1 %    % Monocytes 3.5 1.7 - 9.3 %    % Granulocytes 84.5 (A) 42.2 - 75.2 %    RBC x10/cmm 5.20 4.2 - 5.9 x10/cmm    Hemoglobin 16.0 13.4 - 17.5 g/dl    Hematocrit 48.6 39 - 51 %    MCV 93.5 80 - 100 fL    MCH 30.8 27.0 - 31.0 pg    MCHC 32.9 (A) 33.0 - 37.0 g/dL    Platelet Count 141 140 - 450 K/uL   Mono ([facility])   Result Value Ref Range    Mononucleosis Screen Neg Neg   TSH ([facility])   Result Value Ref Range    TSH 2.200 0.450 - 4.500 uIU/mL    Narrative    Performed at:  01 - [facility] [address], [place], [place]  [id number]  : Omid Sim MD, Phone:  [phone]     ASSESSMENT/PLAN:                                                        Misael was seen today for history of present illness.    Diagnoses and all orders for this visit:    Fatigue, unspecified type  -     Comp. Metabolic Panel (14) ([facility])  -     CBC with Diff/Plt ([facility])    Throat pain  -     Mono ([facility]) negative.    Hypothyroidism due to medication  -     TSH ([facility])    Chronic atrial fibrillation (H)    Given the circumstances around his feeling poorly, I have asked him to hydrate well. His variable BUN/creatinine leads me to believe his hydration status varies considerably. I imagine his travel made this worse. Follow up pending lab results.    There are  no Patient Instructions on file for this visit.    The following health maintenance items are reviewed in Epic and correct as of today:  Health Maintenance   Topic Date Due     HF ACTION PLAN Q3 YR  1945     PHQ-2 Q1 YR  10/01/1955     ADVANCE DIRECTIVE PLANNING Q5 YRS  10/01/1998     AORTIC ANEURYSM SCREENING (SYSTEM ASSIGNED)  10/01/2008     FALL RISK ASSESSMENT  07/10/2015     MEDICARE ANNUAL WELLNESS VISIT  07/14/2016     LIPID MONITORING Q1 YEAR  03/31/2017     INFLUENZA VACCINE (1) 09/01/2018     BMP Q6 MOS  03/20/2019     ALT Q1 YR  09/20/2019     CBC Q1 YR  09/20/2019     COLONOSCOPY Q5 YR  07/29/2020     TETANUS IMMUNIZATION (SYSTEM ASSIGNED)  09/22/2022     PNEUMOCOCCAL  Completed       Tamra Mullen MD  [facility]  [phone]    For any issues my office # is [phone]

## 2018-09-21 ENCOUNTER — OFFICE VISIT (OUTPATIENT)
Dept: CARDIOLOGY | Facility: CLINIC | Age: 73
End: 2018-09-21
Payer: MEDICARE

## 2018-09-21 VITALS
SYSTOLIC BLOOD PRESSURE: 130 MMHG | BODY MASS INDEX: 22.58 KG/M2 | HEART RATE: 72 BPM | WEIGHT: 149 LBS | HEIGHT: 68 IN | DIASTOLIC BLOOD PRESSURE: 92 MMHG

## 2018-09-21 DIAGNOSIS — I50.20 SYSTOLIC CONGESTIVE HEART FAILURE, UNSPECIFIED CONGESTIVE HEART FAILURE CHRONICITY: ICD-10-CM

## 2018-09-21 DIAGNOSIS — I48.0 PAROXYSMAL ATRIAL FIBRILLATION (H): Primary | ICD-10-CM

## 2018-09-21 DIAGNOSIS — I42.0 DILATED CARDIOMYOPATHY (H): ICD-10-CM

## 2018-09-21 DIAGNOSIS — Z79.899 ON AMIODARONE THERAPY: ICD-10-CM

## 2018-09-21 LAB
ALBUMIN SERPL-MCNC: 4.6 G/DL (ref 3.5–4.8)
ALBUMIN/GLOB SERPL: 2.3 {RATIO} (ref 1.2–2.2)
ALP SERPL-CCNC: 95 IU/L (ref 39–117)
ALT SERPL-CCNC: 17 IU/L (ref 0–44)
AST SERPL-CCNC: 25 IU/L (ref 0–40)
BILIRUB SERPL-MCNC: 1.1 MG/DL (ref 0–1.2)
BUN SERPL-MCNC: 19 MG/DL (ref 8–27)
BUN/CREATININE RATIO: 10 (ref 10–24)
CALCIUM SERPL-MCNC: 9.3 MG/DL (ref 8.6–10.2)
CHLORIDE SERPLBLD-SCNC: 92 MMOL/L (ref 96–106)
CREAT SERPL-MCNC: 1.91 MG/DL (ref 0.76–1.27)
EGFR IF AFRICN AM: 39 ML/MIN/1.73
EGFR IF NONAFRICN AM: 34 ML/MIN/1.73
GLOBULIN, TOTAL: 2 G/DL (ref 1.5–4.5)
GLUCOSE SERPL-MCNC: 97 MG/DL (ref 65–99)
POTASSIUM SERPL-SCNC: 5.1 MMOL/L (ref 3.5–5.2)
PROT SERPL-MCNC: 6.6 G/DL (ref 6–8.5)
SODIUM SERPL-SCNC: 131 MMOL/L (ref 134–144)
TOTAL CO2: 21 MMOL/L (ref 20–29)
TSH BLD-ACNC: 2.2 UIU/ML (ref 0.45–4.5)

## 2018-09-21 PROCEDURE — 99214 OFFICE O/P EST MOD 30 MIN: CPT | Performed by: NURSE PRACTITIONER

## 2018-09-21 NOTE — LETTER
9/21/2018    Kevan Bull MD  [address]  [facility] [address]    RE: Misael Fischer       Dear Colleague,    I had the pleasure of seeing Misael Fischer in the [facility].    HPI:  Misael Fischer is a 72 year old male who is a patient of Dr. Castro and Dr. Larkin who presents for H&P for cardioversion.   Past medical history includes tricuspid regurgitation, atrial flutter and SVT s/p CTI and AVNRT ablation, PVCs of different morphologies, cardiomyopathy, hypercholesterolemia, hypertension, mild coronary artery disease based on angiogram completed 11/2017, chronic kidney disease, REAGAN (CPAP).      In 11/2015, he developed heart failure and was noted to be in atrial fibrillation at that time he underwent a KP and DC CV however he went back into atrial flutter and was placed on amiodarone.  He underwent a atrial flutter and AVNRT ablation in 3/2016.    He then developed atrial fibrillation in 11/2016 and underwent a KP which showed evidence of a clot and was adequately coagulated with a repeat KP and 12/2016 and proceeded to DCCV.  He was then placed on amiodarone which caused significant thyroid issues with a TSH greater than 32 he followed with endocrinology and ultimately the amiodarone was discontinued in 11/2017.    He saw Dr. Larkin in 12/2017 and was doing well and zio patch show no evidence of atrial fibrillation or flutter however there was a PVC burden of 3.4%    12/2017 due to his right-sided heart failure secondary to significant tricuspid regurgitation and was evaluated at [facility] by Dr. Garzon.  A right heart cath was done 11/2017 which revealed a cardiac index of 1.0 and a wedge of 20, severely reduced EF, low flow and at least moderate MR.    2/2018 ECHO revealed EF 40% with moderate global hypokinesia of the left ventricle, right ventricle is mild to moderately dilated, moderate MR, Moderate TR    5/2018 he had recurrent atrial  fibrillation was reloaded with amiodarone and underwent a successful DCCV in 6/2018 and afterward his amiodarone was decreased to 100 mg daily     Pt called on 9/17 back in atrial fibrillation with symptoms of lightheadedness at which point his amiodarone 100 mg  was increased to 600 mg daily for 1 week and then 200 mg daily     Today Misael Fischer presents after arriving back from [place], feeling tired, lightheaded, slightly headache, and noted to be in atrial fibrillation   At this time he denies chest pain or pressure, syncope, angina, dyspnea at rest or with exertion, dry cough, orthopnea, PND, abdominal pain, abdominal edema, pedal edema, or claudication.  Denies easy bruising or bleeding, hematuria, hematochezia, and epistaxis. Denies signs/symptoms of stroke such as visual disturbance, difficulty speaking, facial drooping, confusion, problems with gait, or any new numbness or weakness.      ASSESSMENT AND PLAN    (Z79.899) On amiodarone therapy  Amiodarone 200 mg three times daily until 9/25/2018; then 200 mg daily  TSH 2.20 (9/20/2018)   ALT/AST 17/25 (9/20/2018)   PFT will order    (I42.0) Dilated cardiomyopathy & (I50.20) Systolic congestive heart failure, unspecified congestive heart failure chronicity   ECHO completed 2/2018  euvolemic on exam  Torsemide 5-10 mg daily   Daily weights  Low salt diet      (I48.0) Paroxysmal atrial fibrillation (H)    Amiodarone 200 mg three times daily until 9/25/2018; then 200 mg daily    Patient counseled DCCV.  Instructed not to not eat or drink for 8 hours before the procedure and that home medications can be taken with a sip of water.   Complications are uncommon and include but are not limited to abnormal heart rhythm, minor skin burns, reaction to sedation medication, and stroke or pulmonary embolism due to blood clots that may be in the heart.  He as been on uninterrupted Axiban 5 mg twice daily for at least the past 30 days therefore a KP does not need to be  completed.  A formal consent form will be signed by the procedural physician.      Will plan for follow up with JESSICA Easley 1 month after DCCV and Dr. Castro in 12/2018.         Patient expresses understanding and agreement with the plan.     I appreciate the chance to help with Misael Fischer Please let me know if you have any questions or concerns.    Negra Santos, APRN, CNP    This note was completed in part using Dragon voice recognition software. Although reviewed after completion, some word and grammatical errors may occur.    Orders Placed This Encounter   Procedures     Follow-Up with Cardiologist     EKG 12-lead complete w/read - Clinics (performed today)     No orders of the defined types were placed in this encounter.    There are no discontinued medications.      Encounter Diagnoses   Name Primary?     Paroxysmal atrial fibrillation (H) Yes     On amiodarone therapy      Dilated cardiomyopathy (H)      Systolic congestive heart failure, unspecified congestive heart failure chronicity (H)        CURRENT MEDICATIONS:  Current Outpatient Prescriptions   Medication Sig Dispense Refill     Acetaminophen (TYLENOL PO) Take 500 mg by mouth every morning       amiodarone (PACERONE/CODARONE) 200 MG tablet 200 mg Taking three 200mg (600mg) until 9/25/18.  l200 mg tid for 1 week, then 200 mg daily 60 tablet      apixaban ANTICOAGULANT (ELIQUIS) 5 MG tablet Take 1 tablet (5 mg) by mouth 2 times daily 180 tablet 3     levothyroxine (SYNTHROID/LEVOTHROID) 100 MCG tablet Take 88 mcg by mouth daily 88mcg daily.       torsemide (DEMADEX) 5 MG tablet Take 1 tablet (5 mg) by mouth daily 1-2 tab daily. 90 tablet 2     VITAMIN D, CHOLECALCIFEROL, PO Take 1,000 Units by mouth daily        levothyroxine (SYNTHROID/LEVOTHROID) 100 MCG tablet Take 50 mcg by mouth Once a week on Fri         ALLERGIES     Allergies   Allergen Reactions     Ace Inhibitors      Due to CKD, renal insufficiency       PAST MEDICAL HISTORY:  Past  Medical History:   Diagnosis Date     Atrial flutter (H) 11/23/15     AVNRT (AV jade re-entry tachycardia) (H)     status post     BPH (benign prostatic hyperplasia)      CAD (coronary artery disease)     Abnormal Nuc 2014     Cardiomyopathy (H)     Echo 11/2015- EF 35-40%     CHF (congestive heart failure) (H) 11/25/2015    Echo 11/2015- EF 35-40%      CKD (chronic kidney disease)     not on ACE/ARB due to insufficiency     Elevated PSA      Hypercholesterolemia 7/10/2014     Malaria      Mild pulmonic regurgitation and RV dysfunction by prior echocardiogram      Non-rheumatic tricuspid valve insufficiency      Paroxysmal a-fib (H)      Persistent atrial fibrillation with rapid ventricular response (H)      Pigment deposition      Systolic heart failure (H)     Echo 11/2015- EF 35-40%     Tricuspid regurgitation     Mod 2016       PAST SURGICAL HISTORY:  Past Surgical History:   Procedure Laterality Date     ANESTHESIA CARDIOVERSION N/A 12/6/2016    Procedure: ANESTHESIA CARDIOVERSION;  Surgeon: GENERIC ANESTHESIA PROVIDER;  Location:  OR     ANESTHESIA CARDIOVERSION N/A 6/4/2018    Procedure: ANESTHESIA CARDIOVERSION;  ANESTHESIA CARDIOVERSION ;  Surgeon: GENERIC ANESTHESIA PROVIDER;  Location:  OR     C KP (TRANSESOPHAGEAL ECHO)  12/6/16     CARDIOVERSION  12/14/15     H ABLATION ATRIAL FLUTTER  3/21/16     ORTHOPEDIC SURGERY Left 1957    foot injury repair       FAMILY HISTORY:  Family History   Problem Relation Age of Onset     C.A.D. Mother      Prostate Cancer Father 73       SOCIAL HISTORY:  Social History     Social History     Marital status:      Spouse name: N/A     Number of children: N/A     Years of education: N/A     Social History Main Topics     Smoking status: Never Smoker     Smokeless tobacco: Never Used     Alcohol use Yes      Comment: rare     Drug use: No     Sexual activity: Not Currently     Other Topics Concern     Caffeine Concern No     Occupational Exposure No     none      "Sleep Concern No     Stress Concern No     Weight Concern No     Special Diet Yes     low salt     Exercise Yes     walking     Seat Belt Yes     Social History Narrative       Review of Systems:  Skin:  Positive for itching   Eyes:  Positive for glasses  ENT:  Negative epistaxis  Respiratory:  Positive for shortness of breath;dyspnea on exertion  Cardiovascular:    fatigue;Positive for;lightheadedness  Gastroenterology: Negative for melena;hematochezia  Genitourinary:  Negative hesitancy;retention;prostate problem  Musculoskeletal:  Positive for joint pain  Neurologic:  Negative    Psychiatric:  Negative    Heme/Lymph/Imm:  Positive for allergies  Endocrine:  Positive for cold intolerance;thyroid disorder    Physical Exam:  Vitals: BP (!) 130/92  Pulse 72  Ht 1.727 m (5' 7.99\")  Wt 67.6 kg (149 lb)  BMI 22.66 kg/m2    Constitutional:  cooperative;well developed thin      Skin:  warm and dry to the touch, no apparent skin lesions or masses noted        Head:  normocephalic, no masses or lesions        Eyes:           ENT:  no pallor or cyanosis        Neck:  JVP normal        Chest:  clear to auscultation        Cardiac:   irregularly irregular rhythm                Abdomen:  abdomen soft        Vascular:       right radial artery;2+             left radial artery;2+                  Extremities and Back:  no edema;no deformities, clubbing, cyanosis, erythema observed        Neurological:  no gross motor deficits          Recent Lab Results:  LIPID RESULTS:  Lab Results   Component Value Date    CHOL 160 03/31/2016    HDL 69 03/31/2016    LDL 77 03/31/2016    TRIG 68 03/31/2016       LIVER ENZYME RESULTS:  Lab Results   Component Value Date    AST 25 09/20/2018    ALT 17 09/20/2018       CBC RESULTS:  Lab Results   Component Value Date    WBC 5.1 09/20/2018    WBC 4.4 02/17/2018    RBC 5.20 09/20/2018    RBC 4.44 02/17/2018    HGB 16.0 09/20/2018    HCT 48.6 09/20/2018    MCV 93.5 09/20/2018    MCH 30.8 " 09/20/2018    MCHC 32.9 (A) 09/20/2018    RDW 14.6 02/17/2018     09/20/2018       BMP RESULTS:  Lab Results   Component Value Date     (L) 09/20/2018    POTASSIUM 5.1 09/20/2018    CHLORIDE 92 (L) 09/20/2018    CO2 26 06/25/2018    ANIONGAP 10.4 06/25/2018    GLC 97 09/20/2018    BUN 19 09/20/2018    BUN 10 09/20/2018    CR 1.91 (H) 09/20/2018    GFRESTIMATED 52 (L) 06/25/2018    GFRESTBLACK 63 06/25/2018    TAYLOR 9.3 09/20/2018        A1C RESULTS:  Lab Results   Component Value Date    A1C 6.0 03/31/2016       INR RESULTS:  Lab Results   Component Value Date    INR 1.16 (H) 11/17/2017    INR 1.80 (H) 03/21/2016         Thank you for allowing me to participate in the care of your patient.    Sincerely,     KALIN Rodríguez [facility]

## 2018-09-21 NOTE — MR AVS SNAPSHOT
After Visit Summary   9/21/2018    Misael Fischer    MRN: [id number]           Patient Information     Date Of Birth          1945        Visit Information        Provider Department      9/21/2018 12:30 PM Negra Santos APRN [facility]        Today's Diagnoses     Typical atrial flutter (H)    -  1    Dilated cardiomyopathy (H)        Systolic congestive heart failure, unspecified congestive heart failure chronicity (H)          Care Instructions    As always, thank you for trusting us with your health care needs!    If you have any questions regarding your visit please contact your care team:   Cardiology  Telephone Number    Afib RNs:  Jessenia Herrera, and Rylie [phone]   Call for Electrophysiology procedure scheduling concerns [phone]   Device Clinic (Pacemakers, ICDs, Loop Recorders)   RN's:  Migdalia Gallegos Lynda, MJ, Tracy Fischer During business hours:   [phone]                 Follow-ups after your visit        Additional Services     Follow-Up with Cardiologist                 Your next 10 appointments already scheduled     Sep 25, 2018   Procedure with GENERIC ANESTHESIA PROVIDER   [facility]iOp [facility] (--)    [address] [place] [address]   [phone]            Sep 25, 2018 11:30 AM CDT   Cardioversion with  PROCEDURE ROOM   [facility] ([facility])    [address] [place] [address]   [phone]           1) NPO for 8 hours prior to the procedure except for sips of water with medications. 2) Hold insulin or oral diabetic medications morning of procedure. May take   dose long acting insulin. Follow further instructions of PMD. 3) Continue daily Coumadin. ~ If taking Digoxin, hold AM of procedure. ~ Plan to have a responsible adult take you home after the exam. You may not drive, take a bus or taxi by yourself. ~ A responsible adult must stay with  "you for 24 hours after the test.              Future tests that were ordered for you today     Open Future Orders        Priority Expected Expires Ordered    Follow-Up with Cardiologist Routine 12/20/2018 9/21/2019 9/21/2018            Who to contact     If you have questions or need follow up information about today's clinic visit or your schedule please contact [facility] directly at [phone].  Normal or non-critical lab and imaging results will be communicated to you by Healthbox[employer], letter or phone within 4 business days after the clinic has received the results. If you do not hear from us within 7 days, please contact the clinic through [employer] or phone. If you have a critical or abnormal lab result, we will notify you by phone as soon as possible.  Submit refill requests through [employer] or call your pharmacy and they will forward the refill request to us. Please allow 3 business days for your refill to be completed.          Additional Information About Your Visit        [employer] Information     [employer] gives you secure access to your electronic health record. If you see a primary care provider, you can also send messages to your care team and make appointments. If you have questions, please call your primary care clinic.  If you do not have a primary care provider, please call [phone] and they will assist you.        Care EveryWhere ID     This is your Care EveryWhere ID. This could be used by other organizations to access your [facility] medical records  [id number]        Your Vitals Were     Pulse Height BMI (Body Mass Index)             72 1.727 m (5' 7.99\") 22.66 kg/m2          Blood Pressure from Last 3 Encounters:   09/21/18 (!) 130/92   09/20/18 (!) 130/94   06/25/18 (!) 143/93    Weight from Last 3 Encounters:   09/21/18 67.6 kg (149 lb)   09/20/18 67.5 kg (148 lb 12.8 oz)   06/25/18 68 kg (150 lb)              We Performed the Following     EKG 12-lead " complete w/read - Clinics (performed today)        Primary Care Provider Office Phone # Fax #    Kevan Bull [phone] [address]  [facility] [address]        Equal Access to Services     EMANUELMAGALYS RHETT : Hadii aad ku hadasho Soomaali, waaxda luqadaha, qaybta kaalmada adeegyada, waxay idiin hayaan adeeg anila laTinorayjohnny . So [facility] [phone].    ATENCIÓN: Si habla español, tiene a stewart disposición servicios gratuitos de asistencia lingüística. Llame al [phone].    We comply with applicable federal civil rights laws and [place] laws. We do not discriminate on the basis of race, color, national origin, age, disability, sex, sexual orientation, or gender identity.            Thank you!     Thank you for choosing [facility]  for your care. Our goal is always to provide you with excellent care. Hearing back from our patients is one way we can continue to improve our services. Please take a few minutes to complete the written survey that you may receive in the mail after your visit with us. Thank you!             Your Updated Medication List - Protect others around you: Learn how to safely use, store and throw away your medicines at www.disposemymeds.org.          This list is accurate as of 9/21/18  1:01 PM.  Always use your most recent med list.                   Brand Name Dispense Instructions for use Diagnosis    amiodarone 200 MG tablet    PACERONE/CODARONE    60 tablet    200 mg Taking three 200mg (600mg) until 9/25/18.  l200 mg tid for 1 week, then 200 mg daily    Persistent atrial fibrillation (H)       apixaban ANTICOAGULANT 5 MG tablet    ELIQUIS    180 tablet    Take 1 tablet (5 mg) by mouth 2 times daily    Persistent atrial fibrillation (H)       * levothyroxine 100 MCG tablet    SYNTHROID/LEVOTHROID     Take 88 mcg by mouth daily 88mcg daily.        * levothyroxine 100 MCG tablet    SYNTHROID/LEVOTHROID     Take 50 mcg by  mouth Once a week on Fri        torsemide 5 MG tablet    DEMADEX    90 tablet    Take 1 tablet (5 mg) by mouth daily 1-2 tab daily.    Non-rheumatic tricuspid valve insufficiency       TYLENOL PO      Take 500 mg by mouth every morning        VITAMIN D (CHOLECALCIFEROL) PO      Take 1,000 Units by mouth daily        * Notice:  This list has 2 medication(s) that are the same as other medications prescribed for you. Read the directions carefully, and ask your doctor or other care provider to review them with you.

## 2018-09-21 NOTE — PATIENT INSTRUCTIONS
As always, thank you for trusting us with your health care needs!    If you have any questions regarding your visit please contact your care team:   Cardiology  Telephone Number    Afib RNs:  Jessenia Herrera and Rylie [phone]   Call for Electrophysiology procedure scheduling concerns [phone]   Device Clinic (Pacemakers, ICDs, Loop Recorders)   RN's:  Migdalia Gallegos, PAMELA Gross, Tracy Fischer During business hours:   [phone]

## 2018-09-21 NOTE — PROGRESS NOTES
HPI:  Misael Fischer is a 72 year old male who is a patient of Dr. Castro and Dr. Larkin who presents for H&P for cardioversion.   Past medical history includes tricuspid regurgitation, atrial flutter and SVT s/p CTI and AVNRT ablation, PVCs of different morphologies, cardiomyopathy, hypercholesterolemia, hypertension, mild coronary artery disease based on angiogram completed 11/2017, chronic kidney disease, REAGAN (CPAP).      In 11/2015, he developed heart failure and was noted to be in atrial fibrillation at that time he underwent a KP and DC CV however he went back into atrial flutter and was placed on amiodarone.  He underwent a atrial flutter and AVNRT ablation in 3/2016.    He then developed atrial fibrillation in 11/2016 and underwent a KP which showed evidence of a clot and was adequately coagulated with a repeat KP and 12/2016 and proceeded to DCCV.  He was then placed on amiodarone which caused significant thyroid issues with a TSH greater than 32 he followed with endocrinology and ultimately the amiodarone was discontinued in 11/2017.    He saw Dr. Larkin in 12/2017 and was doing well and zio patch show no evidence of atrial fibrillation or flutter however there was a PVC burden of 3.4%    12/2017 due to his right-sided heart failure secondary to significant tricuspid regurgitation and was evaluated at [facility] and [facility] by Dr. Garzon.  A right heart cath was done 11/2017 which revealed a cardiac index of 1.0 and a wedge of 20, severely reduced EF, low flow and at least moderate MR.    2/2018 ECHO revealed EF 40% with moderate global hypokinesia of the left ventricle, right ventricle is mild to moderately dilated, moderate MR, Moderate TR    5/2018 he had recurrent atrial fibrillation was reloaded with amiodarone and underwent a successful DCCV in 6/2018 and afterward his amiodarone was decreased to 100 mg daily     Pt called on 9/17 back in atrial fibrillation with symptoms of  lightheadedness at which point his amiodarone 100 mg  was increased to 600 mg daily for 1 week and then 200 mg daily     Today Misael Fischer presents after arriving back from [place], feeling tired, lightheaded, slightly headache, and noted to be in atrial fibrillation   At this time he denies chest pain or pressure, syncope, angina, dyspnea at rest or with exertion, dry cough, orthopnea, PND, abdominal pain, abdominal edema, pedal edema, or claudication.  Denies easy bruising or bleeding, hematuria, hematochezia, and epistaxis. Denies signs/symptoms of stroke such as visual disturbance, difficulty speaking, facial drooping, confusion, problems with gait, or any new numbness or weakness.      ASSESSMENT AND PLAN    (Z79.899) On amiodarone therapy  Amiodarone 200 mg three times daily until 9/25/2018; then 200 mg daily  TSH 2.20 (9/20/2018)   ALT/AST 17/25 (9/20/2018)   PFT will order    (I42.0) Dilated cardiomyopathy & (I50.20) Systolic congestive heart failure, unspecified congestive heart failure chronicity   ECHO completed 2/2018  euvolemic on exam  Torsemide 5-10 mg daily   Daily weights  Low salt diet      (I48.0) Paroxysmal atrial fibrillation (H)    Amiodarone 200 mg three times daily until 9/25/2018; then 200 mg daily    Patient counseled DCCV.  Instructed not to not eat or drink for 8 hours before the procedure and that home medications can be taken with a sip of water.   Complications are uncommon and include but are not limited to abnormal heart rhythm, minor skin burns, reaction to sedation medication, and stroke or pulmonary embolism due to blood clots that may be in the heart.  He as been on uninterrupted Axiban 5 mg twice daily for at least the past 30 days therefore a KP does not need to be completed.  A formal consent form will be signed by the procedural physician.      Will plan for follow up with JESSICA Easley 1 month after DCCV and Dr. Castro in 12/2018.         Patient expresses understanding  and agreement with the plan.     I appreciate the chance to help with Misael Fischer Please let me know if you have any questions or concerns.    KALIN Plunkett, CNP    This note was completed in part using Dragon voice recognition software. Although reviewed after completion, some word and grammatical errors may occur.    Orders Placed This Encounter   Procedures     Follow-Up with Cardiologist     EKG 12-lead complete w/read - Clinics (performed today)     No orders of the defined types were placed in this encounter.    There are no discontinued medications.      Encounter Diagnoses   Name Primary?     Paroxysmal atrial fibrillation (H) Yes     On amiodarone therapy      Dilated cardiomyopathy (H)      Systolic congestive heart failure, unspecified congestive heart failure chronicity (H)        CURRENT MEDICATIONS:  Current Outpatient Prescriptions   Medication Sig Dispense Refill     Acetaminophen (TYLENOL PO) Take 500 mg by mouth every morning       amiodarone (PACERONE/CODARONE) 200 MG tablet 200 mg Taking three 200mg (600mg) until 9/25/18.  l200 mg tid for 1 week, then 200 mg daily 60 tablet      apixaban ANTICOAGULANT (ELIQUIS) 5 MG tablet Take 1 tablet (5 mg) by mouth 2 times daily 180 tablet 3     levothyroxine (SYNTHROID/LEVOTHROID) 100 MCG tablet Take 88 mcg by mouth daily 88mcg daily.       torsemide (DEMADEX) 5 MG tablet Take 1 tablet (5 mg) by mouth daily 1-2 tab daily. 90 tablet 2     VITAMIN D, CHOLECALCIFEROL, PO Take 1,000 Units by mouth daily        levothyroxine (SYNTHROID/LEVOTHROID) 100 MCG tablet Take 50 mcg by mouth Once a week on Fri         ALLERGIES     Allergies   Allergen Reactions     Ace Inhibitors      Due to CKD, renal insufficiency       PAST MEDICAL HISTORY:  Past Medical History:   Diagnosis Date     Atrial flutter (H) 11/23/15     AVNRT (AV jade re-entry tachycardia) (H)     status post     BPH (benign prostatic hyperplasia)      CAD (coronary artery disease)     Abnormal  Nuc 2014     Cardiomyopathy (H)     Echo 11/2015- EF 35-40%     CHF (congestive heart failure) (H) 11/25/2015    Echo 11/2015- EF 35-40%      CKD (chronic kidney disease)     not on ACE/ARB due to insufficiency     Elevated PSA      Hypercholesterolemia 7/10/2014     Malaria      Mild pulmonic regurgitation and RV dysfunction by prior echocardiogram      Non-rheumatic tricuspid valve insufficiency      Paroxysmal a-fib (H)      Persistent atrial fibrillation with rapid ventricular response (H)      Pigment deposition      Systolic heart failure (H)     Echo 11/2015- EF 35-40%     Tricuspid regurgitation     Mod 2016       PAST SURGICAL HISTORY:  Past Surgical History:   Procedure Laterality Date     ANESTHESIA CARDIOVERSION N/A 12/6/2016    Procedure: ANESTHESIA CARDIOVERSION;  Surgeon: GENERIC ANESTHESIA PROVIDER;  Location:  OR     ANESTHESIA CARDIOVERSION N/A 6/4/2018    Procedure: ANESTHESIA CARDIOVERSION;  ANESTHESIA CARDIOVERSION ;  Surgeon: GENERIC ANESTHESIA PROVIDER;  Location:  OR     C KP (TRANSESOPHAGEAL ECHO)  12/6/16     CARDIOVERSION  12/14/15     H ABLATION ATRIAL FLUTTER  3/21/16     ORTHOPEDIC SURGERY Left 1957    foot injury repair       FAMILY HISTORY:  Family History   Problem Relation Age of Onset     C.A.D. Mother      Prostate Cancer Father 73       SOCIAL HISTORY:  Social History     Social History     Marital status:      Spouse name: N/A     Number of children: N/A     Years of education: N/A     Social History Main Topics     Smoking status: Never Smoker     Smokeless tobacco: Never Used     Alcohol use Yes      Comment: rare     Drug use: No     Sexual activity: Not Currently     Other Topics Concern     Caffeine Concern No     Occupational Exposure No     none     Sleep Concern No     Stress Concern No     Weight Concern No     Special Diet Yes     low salt     Exercise Yes     walking     Seat Belt Yes     Social History Narrative       Review of Systems:  Skin:  Positive  "for itching   Eyes:  Positive for glasses  ENT:  Negative epistaxis  Respiratory:  Positive for shortness of breath;dyspnea on exertion  Cardiovascular:    fatigue;Positive for;lightheadedness  Gastroenterology: Negative for melena;hematochezia  Genitourinary:  Negative hesitancy;retention;prostate problem  Musculoskeletal:  Positive for joint pain  Neurologic:  Negative    Psychiatric:  Negative    Heme/Lymph/Imm:  Positive for allergies  Endocrine:  Positive for cold intolerance;thyroid disorder    Physical Exam:  Vitals: BP (!) 130/92  Pulse 72  Ht 1.727 m (5' 7.99\")  Wt 67.6 kg (149 lb)  BMI 22.66 kg/m2    Constitutional:  cooperative;well developed thin      Skin:  warm and dry to the touch, no apparent skin lesions or masses noted        Head:  normocephalic, no masses or lesions        Eyes:           ENT:  no pallor or cyanosis        Neck:  JVP normal        Chest:  clear to auscultation        Cardiac:   irregularly irregular rhythm                Abdomen:  abdomen soft        Vascular:       right radial artery;2+             left radial artery;2+                  Extremities and Back:  no edema;no deformities, clubbing, cyanosis, erythema observed        Neurological:  no gross motor deficits          Recent Lab Results:  LIPID RESULTS:  Lab Results   Component Value Date    CHOL 160 03/31/2016    HDL 69 03/31/2016    LDL 77 03/31/2016    TRIG 68 03/31/2016       LIVER ENZYME RESULTS:  Lab Results   Component Value Date    AST 25 09/20/2018    ALT 17 09/20/2018       CBC RESULTS:  Lab Results   Component Value Date    WBC 5.1 09/20/2018    WBC 4.4 02/17/2018    RBC 5.20 09/20/2018    RBC 4.44 02/17/2018    HGB 16.0 09/20/2018    HCT 48.6 09/20/2018    MCV 93.5 09/20/2018    MCH 30.8 09/20/2018    MCHC 32.9 (A) 09/20/2018    RDW 14.6 02/17/2018     09/20/2018       BMP RESULTS:  Lab Results   Component Value Date     (L) 09/20/2018    POTASSIUM 5.1 09/20/2018    CHLORIDE 92 (L) 09/20/2018 "    CO2 26 06/25/2018    ANIONGAP 10.4 06/25/2018    GLC 97 09/20/2018    BUN 19 09/20/2018    BUN 10 09/20/2018    CR 1.91 (H) 09/20/2018    GFRESTIMATED 52 (L) 06/25/2018    GFRESTBLACK 63 06/25/2018    TAYLOR 9.3 09/20/2018        A1C RESULTS:  Lab Results   Component Value Date    A1C 6.0 03/31/2016       INR RESULTS:  Lab Results   Component Value Date    INR 1.16 (H) 11/17/2017    INR 1.80 (H) 03/21/2016           CC  No referring provider defined for this encounter.

## 2018-09-21 NOTE — LETTER
9/21/2018    Kevan Bull MD  [address]  [facility] [address]    RE: Misael Fishcer       Dear Colleague,    I had the pleasure of seeing Misael Fischer in the [facility].    HPI:  Misael Fischer is a 72 year old male who is a patient of Dr. Castro and Dr. Larkin who presents for H&P for cardioversion.   Past medical history includes tricuspid regurgitation, atrial flutter and SVT s/p CTI and AVNRT ablation, PVCs of different morphologies, cardiomyopathy, hypercholesterolemia, hypertension, mild coronary artery disease based on angiogram completed 11/2017, chronic kidney disease, REAGAN (CPAP).      In 11/2015, he developed heart failure and was noted to be in atrial fibrillation at that time he underwent a KP and DC CV however he went back into atrial flutter and was placed on amiodarone.  He underwent a atrial flutter and AVNRT ablation in 3/2016.    He then developed atrial fibrillation in 11/2016 and underwent a KP which showed evidence of a clot and was adequately coagulated with a repeat KP and 12/2016 and proceeded to DCCV.  He was then placed on amiodarone which caused significant thyroid issues with a TSH greater than 32 he followed with endocrinology and ultimately the amiodarone was discontinued in 11/2017.    He saw Dr. Larkin in 12/2017 and was doing well and zio patch show no evidence of atrial fibrillation or flutter however there was a PVC burden of 3.4%    12/2017 due to his right-sided heart failure secondary to significant tricuspid regurgitation and was evaluated at [facility] and the [facility] by Dr. Garzon.  A right heart cath was done 11/2017 which revealed a cardiac index of 1.0 and a wedge of 20, severely reduced EF, low flow and at least moderate MR.    2/2018 ECHO revealed EF 40% with moderate global hypokinesia of the left ventricle, right ventricle is mild to moderately dilated, moderate MR, Moderate TR    5/2018 he had recurrent atrial  fibrillation was reloaded with amiodarone and underwent a successful DCCV in 6/2018 and afterward his amiodarone was decreased to 100 mg daily     Pt called on 9/17 back in atrial fibrillation with symptoms of lightheadedness at which point his amiodarone 100 mg  was increased to 600 mg daily for 1 week and then 200 mg daily     Today Misael Fischer presents after arriving back from [place], feeling tired, lightheaded, slightly headache, and noted to be in atrial fibrillation   At this time he denies chest pain or pressure, syncope, angina, dyspnea at rest or with exertion, dry cough, orthopnea, PND, abdominal pain, abdominal edema, pedal edema, or claudication.  Denies easy bruising or bleeding, hematuria, hematochezia, and epistaxis. Denies signs/symptoms of stroke such as visual disturbance, difficulty speaking, facial drooping, confusion, problems with gait, or any new numbness or weakness.      ASSESSMENT AND PLAN    (Z79.899) On amiodarone therapy  Amiodarone 200 mg three times daily until 9/25/2018; then 200 mg daily  TSH 2.20 (9/20/2018)   ALT/AST 17/25 (9/20/2018)   PFT will order    (I42.0) Dilated cardiomyopathy & (I50.20) Systolic congestive heart failure, unspecified congestive heart failure chronicity   ECHO completed 2/2018  euvolemic on exam  Torsemide 5-10 mg daily   Daily weights  Low salt diet      (I48.0) Paroxysmal atrial fibrillation (H)    Amiodarone 200 mg three times daily until 9/25/2018; then 200 mg daily    Patient counseled DCCV.  Instructed not to not eat or drink for 8 hours before the procedure and that home medications can be taken with a sip of water.   Complications are uncommon and include but are not limited to abnormal heart rhythm, minor skin burns, reaction to sedation medication, and stroke or pulmonary embolism due to blood clots that may be in the heart.  He as been on uninterrupted Axiban 5 mg twice daily for at least the past 30 days therefore a KP does not need to be  completed.  A formal consent form will be signed by the procedural physician.      Will plan for follow up with JESSICA Easley 1 month after DCCV and Dr. Castro in 12/2018.         Patient expresses understanding and agreement with the plan.     I appreciate the chance to help with Misael Fischer Please let me know if you have any questions or concerns.    Negra Santos, APRN, CNP    This note was completed in part using Dragon voice recognition software. Although reviewed after completion, some word and grammatical errors may occur.    Orders Placed This Encounter   Procedures     Follow-Up with Cardiologist     EKG 12-lead complete w/read - Clinics (performed today)     No orders of the defined types were placed in this encounter.    There are no discontinued medications.      Encounter Diagnoses   Name Primary?     Paroxysmal atrial fibrillation (H) Yes     On amiodarone therapy      Dilated cardiomyopathy (H)      Systolic congestive heart failure, unspecified congestive heart failure chronicity (H)        CURRENT MEDICATIONS:  Current Outpatient Prescriptions   Medication Sig Dispense Refill     Acetaminophen (TYLENOL PO) Take 500 mg by mouth every morning       amiodarone (PACERONE/CODARONE) 200 MG tablet 200 mg Taking three 200mg (600mg) until 9/25/18.  l200 mg tid for 1 week, then 200 mg daily 60 tablet      apixaban ANTICOAGULANT (ELIQUIS) 5 MG tablet Take 1 tablet (5 mg) by mouth 2 times daily 180 tablet 3     levothyroxine (SYNTHROID/LEVOTHROID) 100 MCG tablet Take 88 mcg by mouth daily 88mcg daily.       torsemide (DEMADEX) 5 MG tablet Take 1 tablet (5 mg) by mouth daily 1-2 tab daily. 90 tablet 2     VITAMIN D, CHOLECALCIFEROL, PO Take 1,000 Units by mouth daily        levothyroxine (SYNTHROID/LEVOTHROID) 100 MCG tablet Take 50 mcg by mouth Once a week on Fri         ALLERGIES     Allergies   Allergen Reactions     Ace Inhibitors      Due to CKD, renal insufficiency       PAST MEDICAL HISTORY:  Past  Medical History:   Diagnosis Date     Atrial flutter (H) 11/23/15     AVNRT (AV jade re-entry tachycardia) (H)     status post     BPH (benign prostatic hyperplasia)      CAD (coronary artery disease)     Abnormal Nuc 2014     Cardiomyopathy (H)     Echo 11/2015- EF 35-40%     CHF (congestive heart failure) (H) 11/25/2015    Echo 11/2015- EF 35-40%      CKD (chronic kidney disease)     not on ACE/ARB due to insufficiency     Elevated PSA      Hypercholesterolemia 7/10/2014     Malaria      Mild pulmonic regurgitation and RV dysfunction by prior echocardiogram      Non-rheumatic tricuspid valve insufficiency      Paroxysmal a-fib (H)      Persistent atrial fibrillation with rapid ventricular response (H)      Pigment deposition      Systolic heart failure (H)     Echo 11/2015- EF 35-40%     Tricuspid regurgitation     Mod 2016       PAST SURGICAL HISTORY:  Past Surgical History:   Procedure Laterality Date     ANESTHESIA CARDIOVERSION N/A 12/6/2016    Procedure: ANESTHESIA CARDIOVERSION;  Surgeon: GENERIC ANESTHESIA PROVIDER;  Location:  OR     ANESTHESIA CARDIOVERSION N/A 6/4/2018    Procedure: ANESTHESIA CARDIOVERSION;  ANESTHESIA CARDIOVERSION ;  Surgeon: GENERIC ANESTHESIA PROVIDER;  Location:  OR     C KP (TRANSESOPHAGEAL ECHO)  12/6/16     CARDIOVERSION  12/14/15     H ABLATION ATRIAL FLUTTER  3/21/16     ORTHOPEDIC SURGERY Left 1957    foot injury repair       FAMILY HISTORY:  Family History   Problem Relation Age of Onset     C.A.D. Mother      Prostate Cancer Father 73       SOCIAL HISTORY:  Social History     Social History     Marital status:      Spouse name: N/A     Number of children: N/A     Years of education: N/A     Social History Main Topics     Smoking status: Never Smoker     Smokeless tobacco: Never Used     Alcohol use Yes      Comment: rare     Drug use: No     Sexual activity: Not Currently     Other Topics Concern     Caffeine Concern No     Occupational Exposure No     none      "Sleep Concern No     Stress Concern No     Weight Concern No     Special Diet Yes     low salt     Exercise Yes     walking     Seat Belt Yes     Social History Narrative       Review of Systems:  Skin:  Positive for itching   Eyes:  Positive for glasses  ENT:  Negative epistaxis  Respiratory:  Positive for shortness of breath;dyspnea on exertion  Cardiovascular:    fatigue;Positive for;lightheadedness  Gastroenterology: Negative for melena;hematochezia  Genitourinary:  Negative hesitancy;retention;prostate problem  Musculoskeletal:  Positive for joint pain  Neurologic:  Negative    Psychiatric:  Negative    Heme/Lymph/Imm:  Positive for allergies  Endocrine:  Positive for cold intolerance;thyroid disorder    Physical Exam:  Vitals: BP (!) 130/92  Pulse 72  Ht 1.727 m (5' 7.99\")  Wt 67.6 kg (149 lb)  BMI 22.66 kg/m2    Constitutional:  cooperative;well developed thin      Skin:  warm and dry to the touch, no apparent skin lesions or masses noted        Head:  normocephalic, no masses or lesions        Eyes:           ENT:  no pallor or cyanosis        Neck:  JVP normal        Chest:  clear to auscultation        Cardiac:   irregularly irregular rhythm                Abdomen:  abdomen soft        Vascular:       right radial artery;2+             left radial artery;2+                  Extremities and Back:  no edema;no deformities, clubbing, cyanosis, erythema observed        Neurological:  no gross motor deficits          Recent Lab Results:  LIPID RESULTS:  Lab Results   Component Value Date    CHOL 160 03/31/2016    HDL 69 03/31/2016    LDL 77 03/31/2016    TRIG 68 03/31/2016       LIVER ENZYME RESULTS:  Lab Results   Component Value Date    AST 25 09/20/2018    ALT 17 09/20/2018       CBC RESULTS:  Lab Results   Component Value Date    WBC 5.1 09/20/2018    WBC 4.4 02/17/2018    RBC 5.20 09/20/2018    RBC 4.44 02/17/2018    HGB 16.0 09/20/2018    HCT 48.6 09/20/2018    MCV 93.5 09/20/2018    MCH 30.8 " 09/20/2018    MCHC 32.9 (A) 09/20/2018    RDW 14.6 02/17/2018     09/20/2018       BMP RESULTS:  Lab Results   Component Value Date     (L) 09/20/2018    POTASSIUM 5.1 09/20/2018    CHLORIDE 92 (L) 09/20/2018    CO2 26 06/25/2018    ANIONGAP 10.4 06/25/2018    GLC 97 09/20/2018    BUN 19 09/20/2018    BUN 10 09/20/2018    CR 1.91 (H) 09/20/2018    GFRESTIMATED 52 (L) 06/25/2018    GFRESTBLACK 63 06/25/2018    TAYLOR 9.3 09/20/2018        A1C RESULTS:  Lab Results   Component Value Date    A1C 6.0 03/31/2016       INR RESULTS:  Lab Results   Component Value Date    INR 1.16 (H) 11/17/2017    INR 1.80 (H) 03/21/2016           CC  No referring provider defined for this encounter.                  Thank you for allowing me to participate in the care of your patient.      Sincerely,     KALIN Rodríguez [facility]    cc:   No referring provider defined for this encounter.

## 2018-09-25 ENCOUNTER — SURGERY (OUTPATIENT)
Age: 73
End: 2018-09-25

## 2018-09-25 ENCOUNTER — HOSPITAL ENCOUNTER (OUTPATIENT)
Dept: CARDIOLOGY | Facility: CLINIC | Age: 73
End: 2018-09-25
Attending: INTERNAL MEDICINE
Payer: MEDICARE

## 2018-09-25 ENCOUNTER — ANESTHESIA (OUTPATIENT)
Dept: SURGERY | Facility: CLINIC | Age: 73
End: 2018-09-25
Payer: MEDICARE

## 2018-09-25 ENCOUNTER — TELEPHONE (OUTPATIENT)
Dept: CARDIOLOGY | Facility: CLINIC | Age: 73
End: 2018-09-25

## 2018-09-25 ENCOUNTER — ANESTHESIA EVENT (OUTPATIENT)
Dept: SURGERY | Facility: CLINIC | Age: 73
End: 2018-09-25
Payer: MEDICARE

## 2018-09-25 ENCOUNTER — HOSPITAL ENCOUNTER (OUTPATIENT)
Facility: CLINIC | Age: 73
Discharge: HOME | End: 2018-09-25
Attending: INTERNAL MEDICINE | Admitting: INTERNAL MEDICINE
Payer: MEDICARE

## 2018-09-25 VITALS
OXYGEN SATURATION: 98 % | DIASTOLIC BLOOD PRESSURE: 98 MMHG | SYSTOLIC BLOOD PRESSURE: 133 MMHG | HEIGHT: 68 IN | HEART RATE: 60 BPM | WEIGHT: 146 LBS | BODY MASS INDEX: 22.13 KG/M2

## 2018-09-25 DIAGNOSIS — I48.19 PERSISTENT ATRIAL FIBRILLATION (H): ICD-10-CM

## 2018-09-25 DIAGNOSIS — I48.0 PAROXYSMAL ATRIAL FIBRILLATION (H): ICD-10-CM

## 2018-09-25 LAB
ANION GAP SERPL CALCULATED.3IONS-SCNC: 9 MMOL/L (ref 3–14)
BUN SERPL-MCNC: 21 MG/DL (ref 7–30)
CALCIUM SERPL-MCNC: 8.8 MG/DL (ref 8.5–10.1)
CHLORIDE SERPL-SCNC: 98 MMOL/L (ref 94–109)
CO2 SERPL-SCNC: 22 MMOL/L (ref 20–32)
CREAT SERPL-MCNC: 2.14 MG/DL (ref 0.66–1.25)
GFR SERPL CREATININE-BSD FRML MDRD: 30 ML/MIN/1.7M2
GLUCOSE SERPL-MCNC: 98 MG/DL (ref 70–99)
INTERPRETATION ECG - MUSE: NORMAL
MAGNESIUM SERPL-MCNC: 2.6 MG/DL (ref 1.6–2.3)
POTASSIUM SERPL-SCNC: 4.6 MMOL/L (ref 3.4–5.3)
SODIUM SERPL-SCNC: 129 MMOL/L (ref 133–144)

## 2018-09-25 PROCEDURE — 80048 BASIC METABOLIC PNL TOTAL CA: CPT | Performed by: INTERNAL MEDICINE

## 2018-09-25 PROCEDURE — 92960 CARDIOVERSION ELECTRIC EXT: CPT | Performed by: INTERNAL MEDICINE

## 2018-09-25 PROCEDURE — 83735 ASSAY OF MAGNESIUM: CPT | Performed by: INTERNAL MEDICINE

## 2018-09-25 PROCEDURE — 92960 CARDIOVERSION ELECTRIC EXT: CPT

## 2018-09-25 PROCEDURE — 93010 ELECTROCARDIOGRAM REPORT: CPT | Performed by: INTERNAL MEDICINE

## 2018-09-25 PROCEDURE — 37000008 ZZH ANESTHESIA TECHNICAL FEE, 1ST 30 MIN

## 2018-09-25 RX ORDER — ATROPINE SULFATE 0.1 MG/ML
INJECTION INTRAVENOUS
Status: DISCONTINUED
Start: 2018-09-25 | End: 2018-09-25 | Stop reason: WASHOUT

## 2018-09-25 RX ORDER — AMIODARONE HYDROCHLORIDE 200 MG/1
200 TABLET ORAL DAILY
Qty: 90 TABLET | Refills: 3 | Status: SHIPPED | OUTPATIENT
Start: 2018-09-25 | End: 2019-06-28

## 2018-09-25 RX ORDER — PROPOFOL 10 MG/ML
INJECTION, EMULSION INTRAVENOUS PRN
Status: DISCONTINUED | OUTPATIENT
Start: 2018-09-25 | End: 2018-09-25

## 2018-09-25 RX ORDER — LIDOCAINE 40 MG/G
CREAM TOPICAL
Status: CANCELLED | OUTPATIENT
Start: 2018-09-25

## 2018-09-25 RX ORDER — POTASSIUM CHLORIDE 1500 MG/1
20 TABLET, EXTENDED RELEASE ORAL
Status: ACTIVE | OUTPATIENT
Start: 2018-09-25 | End: 2018-09-25

## 2018-09-25 RX ADMIN — PROPOFOL 10 MG: 10 INJECTION, EMULSION INTRAVENOUS at 11:43

## 2018-09-25 RX ADMIN — PROPOFOL 40 MG: 10 INJECTION, EMULSION INTRAVENOUS at 11:42

## 2018-09-25 ASSESSMENT — ENCOUNTER SYMPTOMS: DYSRHYTHMIAS: 1

## 2018-09-25 NOTE — ANESTHESIA PREPROCEDURE EVALUATION
PAC NOTE:  PAC Discussion and Assessment    ASA Classification: 3  Case is suitable for:   Anesthetic techniques and relevant risks discussed:   Invasive monitoring and risk discussed:   Types:   Possibility and Risk of blood transfusion discussed:   NPO instructions given:   Additional anesthetic preparation and risks discussed:   Needs early admission to pre-op area:   Other:     PAC Resident/NP Anesthesia Assessment:        Mid-Level Provider/Resident:   Date:   Time:     Attending Anesthesiologist Anesthesia Assessment:        Anesthesiologist:   Date:   Time:   Pass/Fail:   Disposition:     PAC Pharmacist Assessment:        Pharmacist:   Date:   Time:      ANESTHESIA PRE EVALUATION:  Anesthesia Evaluation     .             ROS/MED HX    ENT/Pulmonary:       Neurologic:       Cardiovascular:     (+) hypertension--CAD, --. : . CHF . . :. dysrhythmias a-fib, Irregular Heartbeat/Palpitations, .       METS/Exercise Tolerance:     Hematologic:         Musculoskeletal:         GI/Hepatic:         Renal/Genitourinary:     (+) chronic renal disease, type: CRI, Pt does not require dialysis, Pt has history of transplant,       Endo:         Psychiatric:         Infectious Disease:         Malignancy:         Other:                     Physical Exam  Normal systems: pulmonary and dental    Airway   Mallampati: II  TM distance: <3 FB  Neck ROM: full    Dental     Cardiovascular   Rhythm and rate: irregular and abnormal      Pulmonary                                     .

## 2018-09-25 NOTE — TELEPHONE ENCOUNTER
Call to pt with Patrizia recommendations.  Pt will hold his Torsemide and would like to have his BMP completed here a [facility] at 1030. Orders in place for BMP.  Pt does not take KCL that is on med list at this time for pt Cardioversion.  Bmp ordered. Monica

## 2018-09-25 NOTE — ANESTHESIA CARE TRANSFER NOTE
Patient: Misael Fsicher    Procedure(s):  ANESTHESIA CARDIOVERSION     Diagnosis: A-Fib  Diagnosis Additional Information: No value filed.    Anesthesia Type:   No value filed.     Note:    Patient transferred to:Telemetry/Step Down Unit  Comments: VSS, converted, airway patent. Handoff Report: Identifed the Patient, Identified the Reponsible Provider, Reviewed the pertinent medical history, Discussed the surgical course, Reviewed Intra-OP anesthesia mangement and issues during anesthesia and Allowed opportunity for questions and acknowledgement of understanding      Vitals: (Last set prior to Anesthesia Care Transfer)    CRNA VITALS  9/25/2018 1114 - 9/25/2018 1151      9/25/2018             EKG: Sinus bradycardia                Electronically Signed By: KALIN Chilel CRNA  September 25, 2018  11:51 AM

## 2018-09-25 NOTE — IP AVS SNAPSHOT
[facility]    [address]    [facility] [address]    Phone:  [phone]                                       After Visit Summary   9/25/2018    Misael Fischer    MRN: [id number]           After Visit Summary Signature Page     I have received my discharge instructions, and my questions have been answered. I have discussed any challenges I see with this plan with the nurse or doctor.    ..........................................................................................................................................  Patient/Patient Representative Signature      ..........................................................................................................................................  Patient Representative Print Name and Relationship to Patient    ..................................................               ................................................  Date                                   Time    ..........................................................................................................................................  Reviewed by Signature/Title    ...................................................              ..............................................  Date                                               Time          22EPIC Rev 08/18

## 2018-09-25 NOTE — TELEPHONE ENCOUNTER
Glad that he's back in SR but concerned about Cr.    Pls have him HOLD torsemide until Friday, and recheck BMP here or at Dr. Bull's office. Pls confirm he's not on daily KCl (I see it on med list but only for today with DCCV).   Pls update EPIC and order BMP.    Thx    Component      Latest Ref Rng & Units 2/17/2018 6/25/2018 9/20/2018 9/25/2018   Glucose      70 - 99 mg/dL 73 88 97 98   Urea Nitrogen      7 - 30 mg/dL 63 (H) 17 19 21   Creatinine      0.66 - 1.25 mg/dL 3.37 (H) 1.34 (H) 1.91 (H) 2.14 (H)   eGFR If NonAfricn Am      >59 mL/min/1.73   34 (L)    eGFR If Africn Am      >59 mL/min/1.73   39 (L)    BUN/Creatinine Ratio      10 - 24   10    Sodium      133 - 144 mmol/L 132 (L) 127 (L) 131 (L) 129 (L)   Potassium      3.4 - 5.3 mmol/L 4.8 5.4 (H) 5.1 4.6   Chloride      94 - 109 mmol/L 100 96 (L) 92 (L) 98   Total CO2      20 - 29 mmol/L   21    Calcium      8.5 - 10.1 mg/dL 8.3 (L) 9.1 9.3 8.8   Protein Total      6.0 - 8.5 g/dL   6.6    Albumin      3.5 - 4.8 g/dL   4.6    Globulin, Total      1.5 - 4.5 g/dL   2.0    A/G Ratio      1.2 - 2.2   2.3 (H)    Bilirubin Total      0.0 - 1.2 mg/dL   1.1    Alkaline Phosphatase      39 - 117 IU/L   95    AST      0 - 40 IU/L   25    ALT      0 - 44 IU/L   17    Carbon Dioxide      20 - 32 mmol/L 22 26  22   Anion Gap      3 - 14 mmol/L 10 10.4  9   GFR Estimate      >60 mL/min/1.7m2 18 (L) 52 (L)  30 (L)   GFR Estimate If Black      >60 mL/min/1.7m2 22 (L) 63  37 (L)

## 2018-09-25 NOTE — IP AVS SNAPSHOT
MRN:[id number]                      After Visit Summary   9/25/2018    Misael Fischer    MRN: [id number]           Visit Information        Provider Department      9/25/2018 11:30 AM RH PROCEDURE ROOM [facility]           Review of your medicines      Notice     This visit is during an admission. Changes to the med list made in this visit will be reflected in the After Visit Summary of the admission.             Protect others around you: Learn how to safely use, store and throw away your medicines at www.disposemymeds.org.         Follow-ups after your visit        Your next 10 appointments already scheduled     Oct 02, 2018 10:00 AM CDT   Pulmonary Function with  Pulmonary Rehab 1   [facility] ([facility])    [address], [address]  [place] [address]   [phone]           No Inhalers for 6 hours prior to test No Smoking 2 hours prior to test            Oct 15, 2018  3:50 PM CDT   [facility] EP RETURN with Stephanie Jaimes PA-C   [facility] ([facility])    [address]  [place] [address]   [phone] OPT 2            Dec 21, 2018 10:15 AM CST   Return Visit with Prabhakar Castro MD   [facility] ([facility])    [address]  [facility] [address]   [phone]               Care Instructions        Further instructions from your care team         Discharge Instructions for Cardioversion  Your healthcare provider performed a procedure called cardioversion. Your healthcare provider used a controlled electric shock or a medicine to briefly stop all electrical activity in your heart. This helped restore your heart s normal rhythm. Here are some instructions to follow while you recover.  Home care    Because cardioversion typically requires sedation, you won't be able to drive home. You will  need a ride. Wait at least 24 hours before driving a car or operating heavy machinery after receiving sedating medicines.    Don t be alarmed if the skin on your chest is irritated or feels like it is sunburned. Your healthcare provider may prescribe a soothing lotion to relieve this discomfort. These minor symptoms will go away in a few days.    Ask your healthcare provider about medicines to keep your heart rhythm steady.    If you were prescribed medicine, take it as instructed by your healthcare provider. Don t skip doses or take double doses. Cardioversion requires blood thinners for at least 4 weeks to prevent a delayed risk of stroke when treating atrial fibrillation or atrial flutter. Be sure you discuss which medicine you are taking to prevent stroke. Ask when you need to have your medicine levels checked, and whether you may be able to stop taking it in the future or whether it is recommended that you take it for life. Some of these blood-thinning medicines will have the dose adjusted, and interact with other medicines or foods. Your healthcare team will give you full instructions on what to watch out for. Report bleeding or symptoms of stroke immediately to your healthcare team and seek emergency medical attention.    Learn to take your own pulse. Keep a record of your results. Ask your healthcare provider when you should seek emergency medical attention. He or she will tell you which pulse rate reading is dangerous.     Keep in mind this procedure may need to be repeated if the abnormal heart rhythm returns. After the procedure, your healthcare provider will tell you if the treatment worked or if you will need further treatments or medication.  Follow-up care  Make a follow-up appointment, or as directed.  Call 911  Call 911 right away if you have:    Chest pain    Shortness of breath    Loss of vision, speech, or strength or coordination in any body part  When to call your healthcare provider  Call your  "healthcare provider right away if you:    Feel faint, dizzy, or lightheaded    Have chest pain with increased activity    Have irregular heartbeat or fast pulse    Have bleeding issues from blood-thinning medicines   Date Last Reviewed: 3/1/2017    0629-1964 [employer]. [address] [place] [address]. All rights reserved. This information is not intended as a substitute for professional medical care. Always follow your healthcare professional's instructions.           Additional Information About Your Visit        MyChart Information     [facility] gives you secure access to your electronic health record. If you see a primary care provider, you can also send messages to your care team and make appointments. If you have questions, please call your primary care clinic.  If you do not have a primary care provider, please call [phone] and they will assist you.        Care EveryWhere ID     This is your Care EveryWhere ID. This could be used by other organizations to access your [place] medical records  [id number]        Your Vitals Were     Blood Pressure Pulse Height Weight Pulse Oximetry BMI (Body Mass Index)    133/98 60 1.727 m (5' 8\") 66.2 kg (146 lb) 98% 22.2 kg/m2       Primary Care Provider Office Phone # Fax #    Kevan Bull [phone]      Equal Access to Services     : Hadii girish ku hadasho Soomaali, waaxda luqadaha, qaybta kaalmada adeegyada, waxay delfina hayrayn sangeeta solomon. So [facility] [phone].    ATENCIÓN: Si habla español, tiene a stewart disposición servicios gratuitos de asistencia lingüística. Llame al [phone].    We comply with applicable federal civil rights laws and [place] laws. We do not discriminate on the basis of race, color, national origin, age, disability, sex, sexual orientation, or gender identity.            Thank you!     Thank you for choosing [facility] for your care. Our goal is always to " provide you with excellent care. Hearing back from our patients is one way we can continue to improve our services. Please take a few minutes to complete the written survey that you may receive in the mail after you visit. If you would like to speak to someone directly about your visit please contact Patient Relations at [phone]. Thank you!               Medication List: This is a list of all your medications and when to take them. Check marks below indicate your daily home schedule. Keep this list as a reference.      Notice     This visit is during an admission. Changes to the med list made in this visit will be reflected in the After Visit Summary of the admission.

## 2018-09-25 NOTE — DISCHARGE INSTRUCTIONS
Discharge Instructions for Cardioversion  Your healthcare provider performed a procedure called cardioversion. Your healthcare provider used a controlled electric shock or a medicine to briefly stop all electrical activity in your heart. This helped restore your heart s normal rhythm. Here are some instructions to follow while you recover.  Home care    Because cardioversion typically requires sedation, you won't be able to drive home. You will need a ride. Wait at least 24 hours before driving a car or operating heavy machinery after receiving sedating medicines.    Don t be alarmed if the skin on your chest is irritated or feels like it is sunburned. Your healthcare provider may prescribe a soothing lotion to relieve this discomfort. These minor symptoms will go away in a few days.    Ask your healthcare provider about medicines to keep your heart rhythm steady.    If you were prescribed medicine, take it as instructed by your healthcare provider. Don t skip doses or take double doses. Cardioversion requires blood thinners for at least 4 weeks to prevent a delayed risk of stroke when treating atrial fibrillation or atrial flutter. Be sure you discuss which medicine you are taking to prevent stroke. Ask when you need to have your medicine levels checked, and whether you may be able to stop taking it in the future or whether it is recommended that you take it for life. Some of these blood-thinning medicines will have the dose adjusted, and interact with other medicines or foods. Your healthcare team will give you full instructions on what to watch out for. Report bleeding or symptoms of stroke immediately to your healthcare team and seek emergency medical attention.    Learn to take your own pulse. Keep a record of your results. Ask your healthcare provider when you should seek emergency medical attention. He or she will tell you which pulse rate reading is dangerous.     Keep in mind this procedure may need to be  repeated if the abnormal heart rhythm returns. After the procedure, your healthcare provider will tell you if the treatment worked or if you will need further treatments or medication.  Follow-up care  Make a follow-up appointment, or as directed.  Call 911  Call 911 right away if you have:    Chest pain    Shortness of breath    Loss of vision, speech, or strength or coordination in any body part  When to call your healthcare provider  Call your healthcare provider right away if you:    Feel faint, dizzy, or lightheaded    Have chest pain with increased activity    Have irregular heartbeat or fast pulse    Have bleeding issues from blood-thinning medicines   Date Last Reviewed: 3/1/2017    3335-5198 [employer]. [address] [place] [address], [place] [address]. All rights reserved. This information is not intended as a substitute for professional medical care. Always follow your healthcare professional's instructions.

## 2018-09-25 NOTE — PROCEDURES
Cardioversion Note    Informed consent was signed by the patient.  A timeout was taken.    Anesthesia was present for cardioversion, see separate documentation for their sedation.    Baseline rhythm: coarse afib vs aflutter, rate 80's  Synchronized cardioversion performed at 120J, x1  Post-DCCV rhythm: sinus, rate 60's    No complications.    Reji Henning MD  Cardiology - [facility]  Pager: [phone]  Text Page  September 25, 2018

## 2018-09-25 NOTE — TELEPHONE ENCOUNTER
Pt called and stated that Cardioversion was successful, but he is needing a refill of the 200 mg Amiodarone daily.  Will send new refill of Amiodarone for pt to [facility]-sent.   Pt then stated that his Cr was 2.14, when last week it was 1.91, pt was questioned if he was dehydrated.  Pt Sodium is also low at 129 which is up from last check of 127.  Will message Rajni with pt recent labs as pt will be seeing Rajni on 10/15, to check if labs should be done at that time or sooner.  Monica

## 2018-09-28 DIAGNOSIS — I48.19 PERSISTENT ATRIAL FIBRILLATION (H): ICD-10-CM

## 2018-09-28 LAB
ANION GAP SERPL CALCULATED.3IONS-SCNC: 13.1 MMOL/L (ref 6–17)
BUN SERPL-MCNC: 21 MG/DL (ref 7–30)
CALCIUM SERPL-MCNC: 9.7 MG/DL (ref 8.5–10.5)
CHLORIDE SERPL-SCNC: 99 MMOL/L (ref 98–107)
CO2 SERPL-SCNC: 22 MMOL/L (ref 23–29)
CREAT SERPL-MCNC: 2.04 MG/DL (ref 0.7–1.3)
GFR SERPL CREATININE-BSD FRML MDRD: 32 ML/MIN/1.7M2
GLUCOSE SERPL-MCNC: 106 MG/DL (ref 70–105)
POTASSIUM SERPL-SCNC: 5.1 MMOL/L (ref 3.5–5.1)
SODIUM SERPL-SCNC: 129 MMOL/L (ref 136–145)

## 2018-09-28 PROCEDURE — 80048 BASIC METABOLIC PNL TOTAL CA: CPT | Performed by: PHYSICIAN ASSISTANT

## 2018-09-28 PROCEDURE — 36415 COLL VENOUS BLD VENIPUNCTURE: CPT | Performed by: PHYSICIAN ASSISTANT

## 2018-09-28 NOTE — TELEPHONE ENCOUNTER
Message left for patient to review lab results and recommendations per JESSICA Easley awaiting return call.  CLIFFORD Ball

## 2018-09-28 NOTE — TELEPHONE ENCOUNTER
Patient called to see if lab results from today have been reviewed.  Reports HR is good in NSR.   Continues to have fuzzy feeling in head reports this is not knew.  Informed patient that labs had not been reviewed yet and he will get a call with recommendations after review.  CLIFFORD Ball

## 2018-09-28 NOTE — TELEPHONE ENCOUNTER
Pls let pt know that blood work looks better after holding torsemide 5 mg daily.  Cr from 2.14 to 2.04.  Na is stable but still improved c/w 6/25.    Is he having any trouble with swelling?  Weight gain?  Breathing??    If he is doing fine, continue to hold torsemide 5 mg daily until Wednesday 10/3. On Thursday 10/4, call with update.    If he is feeling like he's getting more fluid retention, have him restart his torsemide but only at 5 mg every other day and call Thursday 10/4 with update.     Pls update [employer] list with his decision.    THX!  Rajni    Component      Latest Ref Rng & Units 6/25/2018 9/25/2018 9/28/2018   Sodium      136 - 145 mmol/L 127 (L) 129 (L) 129 (L)   Potassium      3.5 - 5.1 mmol/L 5.4 (H) 4.6 5.1   Chloride      98 - 107 mmol/L 96 (L) 98 99   Carbon Dioxide      23 - 29 mmol/L 26 22 22 (L)   Anion Gap      6 - 17 mmol/L 10.4 9 13.1   Glucose      70 - 105 mg/dL 88 98 106 (H)   Urea Nitrogen      7 - 30 mg/dL 17 21 21   Creatinine      0.70 - 1.30 mg/dL 1.34 (H) 2.14 (H) 2.04 (H)   GFR Estimate      >60 mL/min/1.7m2 52 (L) 30 (L) 32 (L)   GFR Estimate If Black      >60 mL/min/1.7m2 63 37 (L) 39 (L)   Calcium      8.5 - 10.5 mg/dL 9.1 8.8 9.7

## 2018-09-28 NOTE — TELEPHONE ENCOUNTER
Spoke with patient regarding results of BMP today and recommendations per JESSICA Easley  Pls let pt know that blood work looks better after holding torsemide 5 mg daily.  Cr from 2.14 to 2.04.  Na is stable but still improved c/w 6/25.    Is he having any trouble with swelling?  Weight gain?  Breathing??    If he is doing fine, continue to hold torsemide 5 mg daily until Wednesday 10/3. On Thursday 10/4, call with update.    If he is feeling like he's getting more fluid retention, have him restart his torsemide but only at 5 mg every other day and call Thursday 10/4 with update.     Patient reports weight has been stable, notes some swelling at the end of the day however not significant and is gone next day and denies SOB.  Patient will hold torsemide until 10/3 and call with an update on 10/4.  If he runs into any issues and resumes torsemide 5mg po every other day before 10/4 he will call clinic and update us. CLIFFORD Ball

## 2018-10-02 ENCOUNTER — HOSPITAL ENCOUNTER (OUTPATIENT)
Dept: CARDIAC REHAB | Facility: CLINIC | Age: 73
End: 2018-10-02
Attending: NURSE PRACTITIONER
Payer: MEDICARE

## 2018-10-02 ENCOUNTER — TELEPHONE (OUTPATIENT)
Dept: CARDIOLOGY | Facility: CLINIC | Age: 73
End: 2018-10-02

## 2018-10-02 DIAGNOSIS — Z79.899 ON AMIODARONE THERAPY: ICD-10-CM

## 2018-10-02 PROCEDURE — 94729 DIFFUSING CAPACITY: CPT

## 2018-10-02 PROCEDURE — 40001038 ZZH STATISTIC RESPIRATORY TESTING VISIT

## 2018-10-02 PROCEDURE — 94726 PLETHYSMOGRAPHY LUNG VOLUMES: CPT

## 2018-10-02 PROCEDURE — 94375 RESPIRATORY FLOW VOLUME LOOP: CPT

## 2018-10-03 ENCOUNTER — TELEPHONE (OUTPATIENT)
Dept: CARDIOLOGY | Facility: CLINIC | Age: 73
End: 2018-10-03

## 2018-10-03 NOTE — TELEPHONE ENCOUNTER
Pt called and states that he is feeling good, but just wondering if he can move up his OV with Rajni, d/t his schedule.  Explained that Rajni did not have any openings the week before his OV.  Pt states understanding.  Did move up his OV time to 1230. Pt was aware of his PFT results and that they were normal and that Rajni will review in detail with him.  JNelsonCLIFFORD

## 2018-10-04 LAB
DLCOUNC-%PRED-PRE: 97 %
DLCOUNC-PRE: 23.56 ML/MIN/MMHG
DLCOUNC-PRED: 24.16 ML/MIN/MMHG
ERV-%PRED-PRE: 43 %
ERV-PRE: 0.53 L
ERV-PRED: 1.2 L
EXPTIME-PRE: 6.96 SEC
FEF2575-%PRED-PRE: 164 %
FEF2575-PRE: 3.5 L/SEC
FEF2575-PRED: 2.12 L/SEC
FEFMAX-%PRED-PRE: 120 %
FEFMAX-PRE: 8.92 L/SEC
FEFMAX-PRED: 7.38 L/SEC
FEV1-%PRED-PRE: 101 %
FEV1-PRE: 2.91 L
FEV1FEV6-PRE: 83 %
FEV1FEV6-PRED: 77 %
FEV1FVC-PRE: 82 %
FEV1FVC-PRED: 73 %
FEV1SVC-PRE: 81 %
FEV1SVC-PRED: 66 %
FIFMAX-PRE: 3.87 L/SEC
FRCPLETH-%PRED-PRE: 106 %
FRCPLETH-PRE: 3.88 L
FRCPLETH-PRED: 3.63 L
FVC-%PRED-PRE: 93 %
FVC-PRE: 3.53 L
FVC-PRED: 3.79 L
IC-%PRED-PRE: 98 %
IC-PRE: 3.05 L
IC-PRED: 3.1 L
RVPLETH-%PRED-PRE: 125 %
RVPLETH-PRE: 3.36 L
RVPLETH-PRED: 2.68 L
TLCPLETH-%PRED-PRE: 103 %
TLCPLETH-PRE: 6.94 L
TLCPLETH-PRED: 6.72 L
VA-%PRED-PRE: 91 %
VA-PRE: 5.91 L
VC-%PRED-PRE: 83 %
VC-PRE: 3.58 L
VC-PRED: 4.3 L

## 2018-10-16 ENCOUNTER — OFFICE VISIT (OUTPATIENT)
Dept: CARDIOLOGY | Facility: CLINIC | Age: 73
End: 2018-10-16
Payer: MEDICARE

## 2018-10-16 ENCOUNTER — DOCUMENTATION ONLY (OUTPATIENT)
Dept: CARDIOLOGY | Facility: CLINIC | Age: 73
End: 2018-10-16

## 2018-10-16 VITALS
WEIGHT: 143 LBS | HEIGHT: 67 IN | DIASTOLIC BLOOD PRESSURE: 84 MMHG | BODY MASS INDEX: 22.44 KG/M2 | SYSTOLIC BLOOD PRESSURE: 122 MMHG | HEART RATE: 71 BPM

## 2018-10-16 DIAGNOSIS — I48.0 PAROXYSMAL ATRIAL FIBRILLATION (H): ICD-10-CM

## 2018-10-16 DIAGNOSIS — I48.3 TYPICAL ATRIAL FLUTTER (H): ICD-10-CM

## 2018-10-16 DIAGNOSIS — Z79.899 ON AMIODARONE THERAPY: ICD-10-CM

## 2018-10-16 DIAGNOSIS — E78.5 DYSLIPIDEMIA: ICD-10-CM

## 2018-10-16 DIAGNOSIS — I48.0 PAROXYSMAL ATRIAL FIBRILLATION (H): Primary | ICD-10-CM

## 2018-10-16 LAB
ALT SERPL W P-5'-P-CCNC: <5 U/L (ref 5–30)
ANION GAP SERPL CALCULATED.3IONS-SCNC: 15.9 MMOL/L (ref 6–17)
BUN SERPL-MCNC: 25 MG/DL (ref 7–30)
CALCIUM SERPL-MCNC: 9.7 MG/DL (ref 8.5–10.5)
CHLORIDE SERPL-SCNC: 100 MMOL/L (ref 98–107)
CHOLEST SERPL-MCNC: 141 MG/DL
CO2 SERPL-SCNC: 23 MMOL/L (ref 23–29)
CREAT SERPL-MCNC: 1.8 MG/DL (ref 0.7–1.3)
GFR SERPL CREATININE-BSD FRML MDRD: 37 ML/MIN/1.7M2
GLUCOSE SERPL-MCNC: 109 MG/DL (ref 70–105)
HDLC SERPL-MCNC: 61 MG/DL
LDLC SERPL CALC-MCNC: 70 MG/DL
NONHDLC SERPL-MCNC: 80 MG/DL
POTASSIUM SERPL-SCNC: 4.9 MMOL/L (ref 3.5–5.1)
SODIUM SERPL-SCNC: 134 MMOL/L (ref 136–145)
TRIGL SERPL-MCNC: 52 MG/DL

## 2018-10-16 PROCEDURE — 36415 COLL VENOUS BLD VENIPUNCTURE: CPT | Performed by: PHYSICIAN ASSISTANT

## 2018-10-16 PROCEDURE — 99214 OFFICE O/P EST MOD 30 MIN: CPT | Performed by: PHYSICIAN ASSISTANT

## 2018-10-16 PROCEDURE — 93000 ELECTROCARDIOGRAM COMPLETE: CPT | Performed by: PHYSICIAN ASSISTANT

## 2018-10-16 PROCEDURE — 80061 LIPID PANEL: CPT | Performed by: PHYSICIAN ASSISTANT

## 2018-10-16 PROCEDURE — 84460 ALANINE AMINO (ALT) (SGPT): CPT | Performed by: PHYSICIAN ASSISTANT

## 2018-10-16 PROCEDURE — 80048 BASIC METABOLIC PNL TOTAL CA: CPT | Performed by: PHYSICIAN ASSISTANT

## 2018-10-16 NOTE — PROGRESS NOTES
Pls let pt know I reviewed lab work.    Cholesterol looks great. Liver is fine (ALT <5)  Component      Latest Ref Rng & Units 3/31/2016 10/16/2018   Cholesterol      <200 mg/dL 160 141   Triglycerides      <150 mg/dL 68 52   HDL Cholesterol      >39 mg/dL 69 61   LDL Cholesterol Calculated      <100 mg/dL 77 70   Non HDL Cholesterol      <130 mg/dL  80       Paul hasn't been taking torsemide at all and surprising, has been doing well. His Na is improved but still low. Cr still high but improved as well.    Will continue to hold off of torsemide unless he needs it for swelling/GANT as numbers look so much better.    Component      Latest Ref Rng & Units 9/25/2018 9/28/2018 10/16/2018   Sodium      136 - 145 mmol/L 129 (L) 129 (L) 134 (L)   Potassium      3.5 - 5.1 mmol/L 4.6 5.1 4.9   Chloride      98 - 107 mmol/L 98 99 100   Carbon Dioxide      23 - 29 mmol/L 22 22 (L) 23   Anion Gap      6 - 17 mmol/L 9 13.1 15.9   Glucose      70 - 105 mg/dL 98 106 (H) 109 (H)   Urea Nitrogen      7 - 30 mg/dL 21 21 25   Creatinine      0.70 - 1.30 mg/dL 2.14 (H) 2.04 (H) 1.80 (H)   GFR Estimate      >60 mL/min/1.7m2 30 (L) 32 (L) 37 (L)   GFR Estimate If Black      >60 mL/min/1.7m2 37 (L) 39 (L) 45 (L)   Calcium      8.5 - 10.5 mg/dL 8.8 9.7 9.7

## 2018-10-16 NOTE — LETTER
10/16/2018    Kevan Bull MD  [address]  [facility] [address]    RE: Misael Fischer       Dear Colleague,    I had the pleasure of seeing Misael Fischer in the [facility].    HPI and Plan:   See dictation #[id number]    Orders Placed This Encounter   Procedures     Basic metabolic panel     Lipid Profile     ALT     Basic metabolic panel     EKG 12-lead complete w/read - Clinics (performed today)       No orders of the defined types were placed in this encounter.      There are no discontinued medications.      Encounter Diagnoses   Name Primary?     Typical atrial flutter (H)      Paroxysmal atrial fibrillation (H) Yes     Dyslipidemia        CURRENT MEDICATIONS:  Current Outpatient Prescriptions   Medication Sig Dispense Refill     Acetaminophen (TYLENOL PO) Take 500 mg by mouth every morning       amiodarone (PACERONE/CODARONE) 200 MG tablet Take 1 tablet (200 mg) by mouth daily 90 tablet 3     apixaban ANTICOAGULANT (ELIQUIS) 5 MG tablet Take 1 tablet (5 mg) by mouth 2 times daily 180 tablet 3     levothyroxine (SYNTHROID/LEVOTHROID) 100 MCG tablet Take 88 mcg by mouth daily 88mcg daily.       levothyroxine (SYNTHROID/LEVOTHROID) 100 MCG tablet Take 50 mcg by mouth Once a week on Fri       VITAMIN D, CHOLECALCIFEROL, PO Take 1,000 Units by mouth daily        torsemide (DEMADEX) 5 MG tablet Take 1 tablet (5 mg) by mouth daily 1-2 tab daily. (Patient not taking: Reported on 10/16/2018) 90 tablet 2       ALLERGIES     Allergies   Allergen Reactions     Ace Inhibitors      Due to CKD, renal insufficiency       PAST MEDICAL HISTORY:  Past Medical History:   Diagnosis Date     Atrial flutter (H) 11/23/15     AVNRT (AV jade re-entry tachycardia) (H)     status post     BPH (benign prostatic hyperplasia)      CAD (coronary artery disease)     Abnormal Nuc 2014     Cardiomyopathy (H)     Echo 11/2015- EF 35-40%     CHF (congestive heart failure) (H) 11/25/2015    Echo 11/2015-  EF 35-40%      CKD (chronic kidney disease)     not on ACE/ARB due to insufficiency     Elevated PSA      Encounter for cardioversion procedure 09/25/2018    1 shock with 120 joules successful      Hypercholesterolemia 7/10/2014     Malaria      Mild pulmonic regurgitation and RV dysfunction by prior echocardiogram      Non-rheumatic tricuspid valve insufficiency      Paroxysmal A-fib (H)      Persistent atrial fibrillation with rapid ventricular response (H)      Pigment deposition      Systolic heart failure (H)     Echo 11/2015- EF 35-40%     Tricuspid regurgitation     Mod 2016       PAST SURGICAL HISTORY:  Past Surgical History:   Procedure Laterality Date     ANESTHESIA CARDIOVERSION N/A 12/6/2016    Procedure: ANESTHESIA CARDIOVERSION;  Surgeon: GENERIC ANESTHESIA PROVIDER;  Location:  OR     ANESTHESIA CARDIOVERSION N/A 6/4/2018    Procedure: ANESTHESIA CARDIOVERSION;  ANESTHESIA CARDIOVERSION ;  Surgeon: GENERIC ANESTHESIA PROVIDER;  Location:  OR     ANESTHESIA CARDIOVERSION N/A 9/25/2018    Procedure: ANESTHESIA CARDIOVERSION;  ANESTHESIA CARDIOVERSION ;  Surgeon: GENERIC ANESTHESIA PROVIDER;  Location:  OR      KP (TRANSESOPHAGEAL ECHO)  12/6/16     CARDIOVERSION  12/14/15     H ABLATION ATRIAL FLUTTER  3/21/16     ORTHOPEDIC SURGERY Left 1957    foot injury repair       FAMILY HISTORY:  Family History   Problem Relation Age of Onset     C.A.D. Mother      Prostate Cancer Father 73       SOCIAL HISTORY:  Social History     Social History     Marital status:      Spouse name: N/A     Number of children: N/A     Years of education: N/A     Social History Main Topics     Smoking status: Never Smoker     Smokeless tobacco: Never Used     Alcohol use Yes      Comment: rare     Drug use: No     Sexual activity: Not Currently     Other Topics Concern     Caffeine Concern No     Occupational Exposure No     none     Sleep Concern No     Stress Concern No     Weight Concern No     Special Diet Yes  "    low salt     Exercise Yes     walking     Seat Belt Yes     Social History Narrative       Review of Systems:  Skin:  Positive for itching dry itchy skin   Eyes:  Positive for glasses    ENT:  Negative epistaxis nosebleeds, daily when blowing nose when first getting up in the morning  Respiratory:  Negative for shortness of breath;dyspnea on exertion     Cardiovascular:  Negative for;palpitations;chest pain;edema Positive for;exercise intolerance improving  Gastroenterology: Negative for melena;hematochezia No melenta/hematochezia  Genitourinary:  Positive for hesitancy;retention;prostate problem    Musculoskeletal:  Positive for joint pain has worsened  Neurologic:  Negative      Psychiatric:  Negative      Heme/Lymph/Imm:  Positive for allergies RX  Endocrine:  Positive for cold intolerance;thyroid disorder hypothyroidism    Physical Exam:  Vitals: /84 (BP Location: Left arm, Patient Position: Sitting, Cuff Size: Adult Regular)  Pulse 71  Ht 1.702 m (5' 7\")  Wt 64.9 kg (143 lb)  BMI 22.4 kg/m2    Constitutional:  cooperative;well developed thin      Skin:  warm and dry to the touch, no apparent skin lesions or masses noted          Head:  normocephalic, no masses or lesions        Eyes:  pupils equal and round;conjunctivae and lids unremarkable;sclera white        Lymph:      ENT:  no pallor or cyanosis        Neck:  JVP normal        Respiratory:  clear to auscultation         Cardiac: regular rhythm                pulses full and equal                                        GI:  abdomen soft        Extremities and Muscular Skeletal:  no edema;no deformities, clubbing, cyanosis, erythema observed              Neurological:  no gross motor deficits        Psych:  Alert and Oriented x 3        CC  No referring provider defined for this encounter.                Thank you for allowing me to participate in the care of your patient.      Sincerely,     Stephanie Jaimes PA-C     [facility]    cc:   No referring provider defined for this encounter.

## 2018-10-16 NOTE — PROGRESS NOTES
Service Date: 10/16/2018      HISTORY OF PRESENT ILLNESS:  I had the pleasure of seeing Paul today when he came for followup of his recent cardioversion.  He is a pleasant 73-year-old who has seen Dr. Castro and Dr. Larkin in the past for:     1.  Atrial arrhythmias. He was diagnosed with atrial flutter in 11/2015 and underwent DC cardioversion at that time.  He ultimately underwent typical atrial flutter ablation with AVNRT ablation in 03/2016.  He was subsequently diagnosed with atrial fibrillation 11/2016.  He underwent DC cardioversion 12/2016.  He was placed on amiodarone at that time.  This was discontinued in 11/2017, at which time he was found to be grossly hypothyroid.  A followup ZIO Patch in 12/2017 showed no evidence of recurrent atrial arrhythmia, but he came back 05/2018 back in atrial fibrillation.  After a brief load of amiodarone, he underwent a DC cardioversion 06/04/2018.  At that time we had him go back to the amiodarone which was 100 mg daily.  Unfortunately, in 09/2017 he was back in atrial fibrillation and complained of lightheadedness.  Amiodarone was loaded at 200 mg 3 times daily for 1 week and then 200 mg daily and he underwent DC cardioversion 09/25. One shock successfully restored sinus rhythm and he is back for followup.   2.  Chronic anticoagulation with Eliquis 5 mg twice daily.   3.  Hypertension.   4.  Cardiomyopathy, right-sided heart dysfunction and mitral and tricuspid valve disease, with most recent echocardiogram 02/2018.      Paul tells me that since his cardioversion 09/25, he thinks he has been doing really well.  He was complaining about significant fatigue which he had previously blamed on amiodarone, however, he states that when he moved the 200 mg daily to evening, he has had much less of an issue with this.      Surprisingly, he has not had to take any torsemide whatsoever since 09/25.  On 09/25 (date of cardioversion), BMP showed a sodium of 129, potassium 4.6, BUN 21 and  creatinine of 2.14.  I had him hold his torsemide 5 mg daily for 3 days and repeat evaluation on 09/28 showed a stable sodium of 129, potassium up to 5.1, BUN 21, creatinine 2.04.  Our expectation is that he would resume at least a small dose of torsemide as he typically needs it given his right-sided heart dysfunction, but he states he has not taken it since then and he has continued to lose weight with no problems with edema or dyspnea.      Overall, he really feels like he is doing well, especially since moving the amiodarone to evening.  He is off to [place] again next week and will be [facility].  He was just recently in [place].      We reviewed  PFTs that were done for his chronic amiodarone therapy.  These was done 10/02/2018 and showed normal lung function and normal DLCO.  His hemoglobin at the time was normal.  TSH in  09/2018 was normal at 2.20.  CMP on that date was also normal.  He has not had a repeat BMP since 09/28.      EKG today, which I overread, continues to show sinus rhythm at 66 beats per minute.  He continues to have low voltage as well as a first-degree AV block and nonspecific ST-T wave changes.      Last right and left heart catheterization, done at the direction of Dr. Wetzel at the [facility] 12/2017, showed he had just mild coronary disease.  He had a cardiac index of only 1.0 and a wedge pressure of 20.  He had a reduced ejection fraction low flow and at least moderate mitral regurgitation.  There was concern for trabeculated left ventricle but a cardiac MRI done in 2016 that showed no evidence of this (though unsure of contrast was used).        ASSESSMENT AND PLAN:     1.  Atrial fibrillation.  Happily, he remains in sinus rhythm by exam and by EKG.  He had done well since stopping amiodarone in 11/2017 until 05/2018.  His first cardioversion in June worked for a few months but he was back in atrial fibrillation.  His second cardioversion in September seems to have worked well.  He  is on a higher dose of the amiodarone that he had previously been on (100 mg daily increased to 200 mg daily).     At this time, we will have him continue Eliquis therapy as well as amiodarone 200 mg daily.  He will be due for blood work again in the spring of 2018 at which time we will have him see Dr. Larkin.       2.  Mild coronary disease.  This is based on angiogram done 2017.  He continues to follow with Dr. Castro for this.  I note he is not on statin therapy, but would like to obtain a cholesterol panel today.     3.  Right-sided heart dysfunction.  He has been worked up at [facility] and at one time there was concern that he may require a tricuspid valve repair/replacement.  He opted not to proceed with this.  Dr. Castro saw him last in 2017 and requested that he see an advanced heart failure provider and he saw Dr. Wetzel in 2017.  I do not see that he has seen Dr. Wetzel subsequently.      He is well compensated on exam today with no evidence of fluid overload despite the fact that he has held torsemide 5 mg daily since .  He follows his weights carefully as well as lower extremity edema.  At this time, we will have him continue to hold torsemide if he is not having any issues with fluid overload given the fact that he has previously had problems with hyponatremia and has chronic renal insufficiency.      As above, or will get a BMP today and I can contact him with the results.  Of note, he is due to see Dr. Castro 2018.  We will repeat labs at that time as well.      It has been a pleasure to see Paul in clinic today.  I hope that he continues to do well off of the torsemide and can remain in sinus rhythm.         IMTIAZ ALCANTAR PA-C             D: 10/16/2018   T: 10/16/2018   MT: JOYCE      Name:     TREY BURGER   MRN:      [id number]        Account:      [id number]   :      1945           Service Date: 10/16/2018      Document: [id number]

## 2018-10-16 NOTE — MR AVS SNAPSHOT
After Visit Summary   10/16/2018    Misael Fischer    MRN: [id number]           Patient Information     Date Of Birth          1945        Visit Information        Provider Department      10/16/2018 8:30 AM Stephanie Jaimes PA-C [facility]        Today's Diagnoses     Paroxysmal atrial fibrillation (H)    -  1    Typical atrial flutter (H)        Dyslipidemia          Care Instructions    1. EKG today shows normal rhythm - hooray!    2. Continue Amiodarone 200 mg daily at night    3. OK to hold off on the torsemide as long as no evidence of fluid overload (weight gain, breathing, edema).    4. Get blood work today - we'll call with results    5. Get blood work before appt with Dr. Castro.    6. My nurses Rylie Ruelas and Javier: [phone]          Follow-ups after your visit        Your next 10 appointments already scheduled     Dec 21, 2018 10:15 AM CST   Return Visit with Prabhakar Castro MD   [facility] ([facility])    [address] [place] [address] [id number] [phone]              Future tests that were ordered for you today     Open Future Orders        Priority Expected Expires Ordered    Basic metabolic panel Routine 12/21/2018 10/16/2019 10/16/2018    Lipid Profile Routine 10/16/2018 10/16/2019 10/16/2018    ALT Routine 10/16/2018 10/16/2019 10/16/2018    Basic metabolic panel Routine 10/16/2018 10/16/2019 10/16/2018            Who to contact     If you have questions or need follow up information about today's clinic visit or your schedule please contact [facility] directly at [phone].  Normal or non-critical lab and imaging results will be communicated to you by MyChart, letter or phone within 4 business days after the clinic has received the results. If you do not hear from us within 7 days, please contact the clinic through [facility]  "or phone. If you have a critical or abnormal lab result, we will notify you by phone as soon as possible.  Submit refill requests through [facility] or call your pharmacy and they will forward the refill request to us. Please allow 3 business days for your refill to be completed.          Additional Information About Your Visit        [employer] Information     [employer] gives you secure access to your electronic health record. If you see a primary care provider, you can also send messages to your care team and make appointments. If you have questions, please call your primary care clinic.  If you do not have a primary care provider, please call [phone] and they will assist you.        Care EveryWhere ID     This is your Care EveryWhere ID. This could be used by other organizations to access your [facility] medical records  [id number]        Your Vitals Were     Pulse Height BMI (Body Mass Index)             71 1.702 m (5' 7\") 22.4 kg/m2          Blood Pressure from Last 3 Encounters:   10/16/18 (!) 137/91   09/25/18 (!) 133/98   09/21/18 (!) 130/92    Weight from Last 3 Encounters:   10/16/18 64.9 kg (143 lb)   09/25/18 66.2 kg (146 lb)   09/21/18 67.6 kg (149 lb)              We Performed the Following     EKG 12-lead complete w/read - Clinics (performed today)        Primary Care Provider Office Phone # Fax #    Kevan Bull [phone] [address]  [facility] [address]        Equal Access to Services     [facility] Center[facility]IL[facility] : Hadii aad ku hadasho Soomaali, waaxda luqadaha, qaybta kaalmada adeegyada, lakhwinder solomon. So [facility] [phone].    ATENCIÓN: Si habla español, tiene a stewart disposición servicios gratuitos de asistencia lingüística. Llame al [phone].    We comply with applicable federal civil rights laws and [place] laws. We do not discriminate on the basis of race, color, national origin, age, disability, sex, sexual orientation, or gender " identity.            Thank you!     Thank you for choosing [facility]  for your care. Our goal is always to provide you with excellent care. Hearing back from our patients is one way we can continue to improve our services. Please take a few minutes to complete the written survey that you may receive in the mail after your visit with us. Thank you!             Your Updated Medication List - Protect others around you: Learn how to safely use, store and throw away your medicines at www.disposemymeds.org.          This list is accurate as of 10/16/18  8:59 AM.  Always use your most recent med list.                   Brand Name Dispense Instructions for use Diagnosis    amiodarone 200 MG tablet    PACERONE/CODARONE    90 tablet    Take 1 tablet (200 mg) by mouth daily    Persistent atrial fibrillation (H)       apixaban ANTICOAGULANT 5 MG tablet    ELIQUIS    180 tablet    Take 1 tablet (5 mg) by mouth 2 times daily    Persistent atrial fibrillation (H)       * levothyroxine 100 MCG tablet    SYNTHROID/LEVOTHROID     Take 88 mcg by mouth daily 88mcg daily.        * levothyroxine 100 MCG tablet    SYNTHROID/LEVOTHROID     Take 50 mcg by mouth Once a week on Fri        torsemide 5 MG tablet    DEMADEX    90 tablet    Take 1 tablet (5 mg) by mouth daily 1-2 tab daily.    Non-rheumatic tricuspid valve insufficiency       TYLENOL PO      Take 500 mg by mouth every morning        VITAMIN D (CHOLECALCIFEROL) PO      Take 1,000 Units by mouth daily        * Notice:  This list has 2 medication(s) that are the same as other medications prescribed for you. Read the directions carefully, and ask your doctor or other care provider to review them with you.

## 2018-10-16 NOTE — LETTER
10/16/2018      Kevan Bull MD  [address]  [facility] [address]      RE: Misael Fischer       Dear Colleague,    I had the pleasure of seeing Misael Fischer in the [facility].    Service Date: 10/16/2018      HISTORY OF PRESENT ILLNESS:  I had the pleasure of seeing Paul today when he came for followup of his recent cardioversion.  He is a pleasant 73-year-old who has seen Dr. Castro and Dr. Larkin in the past for:     1.  Atrial arrhythmias. He was diagnosed with atrial flutter in 11/2015 and underwent DC cardioversion at that time.  He ultimately underwent typical atrial flutter ablation with AVNRT ablation in 03/2016.  He was subsequently diagnosed with atrial fibrillation 11/2016.  He underwent DC cardioversion 12/2016.  He was placed on amiodarone at that time.  This was discontinued in 11/2017, at which time he was found to be grossly hypothyroid.  A followup ZIO Patch in 12/2017 showed no evidence of recurrent atrial arrhythmia, but he came back 05/2018 back in atrial fibrillation.  After a brief load of amiodarone, he underwent a DC cardioversion 06/04/2018.  At that time we had him go back to the amiodarone which was 100 mg daily.  Unfortunately, in 09/2017 he was back in atrial fibrillation and complained of lightheadedness.  Amiodarone was loaded at 200 mg 3 times daily for 1 week and then 200 mg daily and he underwent DC cardioversion 09/25. One shock successfully restored sinus rhythm and he is back for followup.   2.  Chronic anticoagulation with Eliquis 5 mg twice daily.   3.  Hypertension.   4.  Cardiomyopathy, right-sided heart dysfunction and mitral and tricuspid valve disease, with most recent echocardiogram 02/2018.      Paul tells me that since his cardioversion 09/25, he thinks he has been doing really well.  He was complaining about significant fatigue which he had previously blamed on amiodarone, however, he states that when he moved the 200 mg daily  to evening, he has had much less of an issue with this.      Surprisingly, he has not had to take any torsemide whatsoever since 09/25.  On 09/25 (date of cardioversion), BMP showed a sodium of 129, potassium 4.6, BUN 21 and creatinine of 2.14.  I had him hold his torsemide 5 mg daily for 3 days and repeat evaluation on 09/28 showed a stable sodium of 129, potassium up to 5.1, BUN 21, creatinine 2.04.  Our expectation is that he would resume at least a small dose of torsemide as he typically needs it given his right-sided heart dysfunction, but he states he has not taken it since then and he has continued to lose weight with no problems with edema or dyspnea.      Overall, he really feels like he is doing well, especially since moving the amiodarone to evening.  He is off to [place] again next week and will be [facility].  He was just recently in [place].      We reviewed  PFTs that were done for his chronic amiodarone therapy.  These was done 10/02/2018 and showed normal lung function and normal DLCO.  His hemoglobin at the time was normal.  TSH in  09/2018 was normal at 2.20.  CMP on that date was also normal.  He has not had a repeat BMP since 09/28.      EKG today, which I overread, continues to show sinus rhythm at 66 beats per minute.  He continues to have low voltage as well as a first-degree AV block and nonspecific ST-T wave changes.      Last right and left heart catheterization, done at the direction of Dr. Wetzel at the [facility] 12/2017, showed he had just mild coronary disease.  He had a cardiac index of only 1.0 and a wedge pressure of 20.  He had a reduced ejection fraction low flow and at least moderate mitral regurgitation.  There was concern for trabeculated left ventricle but a cardiac MRI done in 2016 that showed no evidence of this (though unsure of contrast was used).        ASSESSMENT AND PLAN:     1.  Atrial fibrillation.  Happily, he remains in sinus rhythm by exam and by EKG.  He had done  well since stopping amiodarone in 11/2017 until 05/2018.  His first cardioversion in June worked for a few months but he was back in atrial fibrillation.  His second cardioversion in September seems to have worked well.  He is on a higher dose of the amiodarone that he had previously been on (100 mg daily increased to 200 mg daily).     At this time, we will have him continue Eliquis therapy as well as amiodarone 200 mg daily.  He will be due for blood work again in the spring of 2018 at which time we will have him see Dr. Larkin.       2.  Mild coronary disease.  This is based on angiogram done 11/2017.  He continues to follow with Dr. Castro for this.  I note he is not on statin therapy, but would like to obtain a cholesterol panel today.     3.  Right-sided heart dysfunction.  He has been worked up at [facility] and at one time there was concern that he may require a tricuspid valve repair/replacement.  He opted not to proceed with this.  Dr. Castro saw him last in 12/2017 and requested that he see an advanced heart failure provider and he saw Dr. Wetzel in 12/2017.  I do not see that he has seen Dr. Wetzel subsequently.      He is well compensated on exam today with no evidence of fluid overload despite the fact that he has held torsemide 5 mg daily since 09/28.  He follows his weights carefully as well as lower extremity edema.  At this time, we will have him continue to hold torsemide if he is not having any issues with fluid overload given the fact that he has previously had problems with hyponatremia and has chronic renal insufficiency.      As above, or will get a BMP today and I can contact him with the results.  Of note, he is due to see Dr. Castro 12/21/2018.  We will repeat labs at that time as well.      It has been a pleasure to see Paul in clinic today.  I hope that he continues to do well off of the torsemide and can remain in sinus rhythm.         IMTIAZ ALCANTAR PA-C             D: 10/16/2018   T:  10/16/2018   MT: JOYCE      Name:     TREY BURGER   MRN:      [id number]        Account:      [id number]   :      1945           Service Date: 10/16/2018      Document: [id number]           Outpatient Encounter Prescriptions as of 10/16/2018   Medication Sig Dispense Refill     Acetaminophen (TYLENOL PO) Take 500 mg by mouth every morning       amiodarone (PACERONE/CODARONE) 200 MG tablet Take 1 tablet (200 mg) by mouth daily 90 tablet 3     apixaban ANTICOAGULANT (ELIQUIS) 5 MG tablet Take 1 tablet (5 mg) by mouth 2 times daily 180 tablet 3     levothyroxine (SYNTHROID/LEVOTHROID) 100 MCG tablet Take 88 mcg by mouth daily 88mcg daily.       levothyroxine (SYNTHROID/LEVOTHROID) 100 MCG tablet Take 50 mcg by mouth Once a week on Fri       VITAMIN D, CHOLECALCIFEROL, PO Take 1,000 Units by mouth daily        torsemide (DEMADEX) 5 MG tablet Take 1 tablet (5 mg) by mouth daily 1-2 tab daily. (Patient not taking: Reported on 10/16/2018) 90 tablet 2     No facility-administered encounter medications on file as of 10/16/2018.        Again, thank you for allowing me to participate in the care of your patient.      Sincerely,    Stephanie Jaimes PA-C     [facility]

## 2018-10-16 NOTE — PATIENT INSTRUCTIONS
1. EKG today shows normal rhythm - hooray!    2. Continue Amiodarone 200 mg daily at night    3. OK to hold off on the torsemide as long as no evidence of fluid overload (weight gain, breathing, edema).    4. Get blood work today - we'll call with results    5. Get blood work before appt with Dr. Castro.    6. My nurses Rylie Ruelas and Javier: [phone]

## 2018-10-16 NOTE — PROGRESS NOTES
HPI and Plan:   See dictation #[id number]    Orders Placed This Encounter   Procedures     Basic metabolic panel     Lipid Profile     ALT     Basic metabolic panel     EKG 12-lead complete w/read - Clinics (performed today)       No orders of the defined types were placed in this encounter.      There are no discontinued medications.      Encounter Diagnoses   Name Primary?     Typical atrial flutter (H)      Paroxysmal atrial fibrillation (H) Yes     Dyslipidemia        CURRENT MEDICATIONS:  Current Outpatient Prescriptions   Medication Sig Dispense Refill     Acetaminophen (TYLENOL PO) Take 500 mg by mouth every morning       amiodarone (PACERONE/CODARONE) 200 MG tablet Take 1 tablet (200 mg) by mouth daily 90 tablet 3     apixaban ANTICOAGULANT (ELIQUIS) 5 MG tablet Take 1 tablet (5 mg) by mouth 2 times daily 180 tablet 3     levothyroxine (SYNTHROID/LEVOTHROID) 100 MCG tablet Take 88 mcg by mouth daily 88mcg daily.       levothyroxine (SYNTHROID/LEVOTHROID) 100 MCG tablet Take 50 mcg by mouth Once a week on Fri       VITAMIN D, CHOLECALCIFEROL, PO Take 1,000 Units by mouth daily        torsemide (DEMADEX) 5 MG tablet Take 1 tablet (5 mg) by mouth daily 1-2 tab daily. (Patient not taking: Reported on 10/16/2018) 90 tablet 2       ALLERGIES     Allergies   Allergen Reactions     Ace Inhibitors      Due to CKD, renal insufficiency       PAST MEDICAL HISTORY:  Past Medical History:   Diagnosis Date     Atrial flutter (H) 11/23/15     AVNRT (AV jade re-entry tachycardia) (H)     status post     BPH (benign prostatic hyperplasia)      CAD (coronary artery disease)     Abnormal Nuc 2014     Cardiomyopathy (H)     Echo 11/2015- EF 35-40%     CHF (congestive heart failure) (H) 11/25/2015    Echo 11/2015- EF 35-40%      CKD (chronic kidney disease)     not on ACE/ARB due to insufficiency     Elevated PSA      Encounter for cardioversion procedure 09/25/2018    1 shock with 120 joules successful      Hypercholesterolemia  7/10/2014     Malaria      Mild pulmonic regurgitation and RV dysfunction by prior echocardiogram      Non-rheumatic tricuspid valve insufficiency      Paroxysmal A-fib (H)      Persistent atrial fibrillation with rapid ventricular response (H)      Pigment deposition      Systolic heart failure (H)     Echo 11/2015- EF 35-40%     Tricuspid regurgitation     Mod 2016       PAST SURGICAL HISTORY:  Past Surgical History:   Procedure Laterality Date     ANESTHESIA CARDIOVERSION N/A 12/6/2016    Procedure: ANESTHESIA CARDIOVERSION;  Surgeon: GENERIC ANESTHESIA PROVIDER;  Location: [facility] OR     ANESTHESIA CARDIOVERSION N/A 6/4/2018    Procedure: ANESTHESIA CARDIOVERSION;  ANESTHESIA CARDIOVERSION ;  Surgeon: GENERIC ANESTHESIA PROVIDER;  Location: [facility] OR     ANESTHESIA CARDIOVERSION N/A 9/25/2018    Procedure: ANESTHESIA CARDIOVERSION;  ANESTHESIA CARDIOVERSION ;  Surgeon: GENERIC ANESTHESIA PROVIDER;  Location: [facility] OR     C KP (TRANSESOPHAGEAL ECHO)  12/6/16     CARDIOVERSION  12/14/15     H ABLATION ATRIAL FLUTTER  3/21/16     ORTHOPEDIC SURGERY Left 1957    foot injury repair       FAMILY HISTORY:  Family History   Problem Relation Age of Onset     C.A.D. Mother      Prostate Cancer Father 73       SOCIAL HISTORY:  Social History     Social History     Marital status:      Spouse name: N/A     Number of children: N/A     Years of education: N/A     Social History Main Topics     Smoking status: Never Smoker     Smokeless tobacco: Never Used     Alcohol use Yes      Comment: rare     Drug use: No     Sexual activity: Not Currently     Other Topics Concern     Caffeine Concern No     Occupational Exposure No     none     Sleep Concern No     Stress Concern No     Weight Concern No     Special Diet Yes     low salt     Exercise Yes     walking     Seat Belt Yes     Social History Narrative       Review of Systems:  Skin:  Positive for itching dry itchy skin   Eyes:  Positive for glasses    ENT:  Negative epistaxis  "nosebleeds, daily when blowing nose when first getting up in the morning  Respiratory:  Negative for shortness of breath;dyspnea on exertion     Cardiovascular:  Negative for;palpitations;chest pain;edema Positive for;exercise intolerance improving  Gastroenterology: Negative for melena;hematochezia No melenta/hematochezia  Genitourinary:  Positive for hesitancy;retention;prostate problem    Musculoskeletal:  Positive for joint pain has worsened  Neurologic:  Negative      Psychiatric:  Negative      Heme/Lymph/Imm:  Positive for allergies RX  Endocrine:  Positive for cold intolerance;thyroid disorder hypothyroidism    Physical Exam:  Vitals: /84 (BP Location: Left arm, Patient Position: Sitting, Cuff Size: Adult Regular)  Pulse 71  Ht 1.702 m (5' 7\")  Wt 64.9 kg (143 lb)  BMI 22.4 kg/m2    Constitutional:  cooperative;well developed thin      Skin:  warm and dry to the touch, no apparent skin lesions or masses noted          Head:  normocephalic, no masses or lesions        Eyes:  pupils equal and round;conjunctivae and lids unremarkable;sclera white        Lymph:      ENT:  no pallor or cyanosis        Neck:  JVP normal        Respiratory:  clear to auscultation         Cardiac: regular rhythm                pulses full and equal                                        GI:  abdomen soft        Extremities and Muscular Skeletal:  no edema;no deformities, clubbing, cyanosis, erythema observed              Neurological:  no gross motor deficits        Psych:  Alert and Oriented x 3        CC  No referring provider defined for this encounter.              "

## 2018-12-02 DIAGNOSIS — I51.9 DYSFUNCTION OF RIGHT CARDIAC VENTRICLE: Primary | ICD-10-CM

## 2018-12-21 ENCOUNTER — OFFICE VISIT (OUTPATIENT)
Dept: CARDIOLOGY | Facility: CLINIC | Age: 73
End: 2018-12-21
Attending: NURSE PRACTITIONER
Payer: MEDICARE

## 2018-12-21 VITALS
HEART RATE: 68 BPM | BODY MASS INDEX: 21.64 KG/M2 | HEIGHT: 68 IN | DIASTOLIC BLOOD PRESSURE: 72 MMHG | WEIGHT: 142.8 LBS | SYSTOLIC BLOOD PRESSURE: 116 MMHG

## 2018-12-21 DIAGNOSIS — I42.0 DILATED CARDIOMYOPATHY (H): ICD-10-CM

## 2018-12-21 DIAGNOSIS — E78.5 DYSLIPIDEMIA: ICD-10-CM

## 2018-12-21 DIAGNOSIS — I50.22 CHRONIC SYSTOLIC CONGESTIVE HEART FAILURE (H): ICD-10-CM

## 2018-12-21 DIAGNOSIS — I48.3 TYPICAL ATRIAL FLUTTER (H): ICD-10-CM

## 2018-12-21 DIAGNOSIS — I48.0 PAROXYSMAL ATRIAL FIBRILLATION (H): ICD-10-CM

## 2018-12-21 LAB
ANION GAP SERPL CALCULATED.3IONS-SCNC: 4 MMOL/L (ref 3–14)
BUN SERPL-MCNC: 32 MG/DL (ref 7–30)
CALCIUM SERPL-MCNC: 9.1 MG/DL (ref 8.5–10.1)
CHLORIDE SERPL-SCNC: 104 MMOL/L (ref 94–109)
CO2 SERPL-SCNC: 28 MMOL/L (ref 20–32)
CREAT SERPL-MCNC: 1.64 MG/DL (ref 0.66–1.25)
GFR SERPL CREATININE-BSD FRML MDRD: 40 ML/MIN/{1.73_M2}
GLUCOSE SERPL-MCNC: 93 MG/DL (ref 70–99)
POTASSIUM SERPL-SCNC: 4.9 MMOL/L (ref 3.4–5.3)
SODIUM SERPL-SCNC: 136 MMOL/L (ref 133–144)

## 2018-12-21 PROCEDURE — 99214 OFFICE O/P EST MOD 30 MIN: CPT | Performed by: INTERNAL MEDICINE

## 2018-12-21 PROCEDURE — 80048 BASIC METABOLIC PNL TOTAL CA: CPT | Performed by: PHYSICIAN ASSISTANT

## 2018-12-21 PROCEDURE — 36415 COLL VENOUS BLD VENIPUNCTURE: CPT | Performed by: PHYSICIAN ASSISTANT

## 2018-12-21 ASSESSMENT — MIFFLIN-ST. JEOR: SCORE: 1357.24

## 2018-12-21 NOTE — LETTER
12/21/2018      Kevan Bull MD  [address]  [facility] [address]      RE: Misael PATTERSON Amado       Dear Colleague,    I had the pleasure of seeing Misael Fischer in the [facility].    Service Date: 12/21/2018      CARDIOLOGY NOTE      HISTORY OF PRESENT ILLNESS:  Mr. Fischer is a pleasant 73-year-old gentleman with a history of atrial flutter status post atrial flutter ablation, tachycardia-induced cardiomyopathy with an eventual improvement in his ejection fraction into the roughly 40% range, RV dysfunction of unknown etiology for which he has previously been followed at the [facility], moderate-to-severe tricuspid regurgitation related to RV annular dilatation, chronic kidney disease for which I have previously tried to refer him to Nephrology, trivial nonobstructive coronary artery disease noted on a left heart catheterization and recurrent atrial fibrillation which has been suppressed with amiodarone therapy who returns in General Cardiology followup.  I saw the patient last on 12/04/2017.      The patient has been seen by EP Cardiology multiple times this year due to recurrent atrial fibrillation.  Complicating his care was hypothyroidism developing from his amiodarone.  The amiodarone was initially discontinued; however, the patient's atrial fibrillation recurred and he has been placed back on amiodarone and underwent cardioversion.  He has now maintained sinus rhythm and has done well since that time.  He has been placed on levothyroxine and his most recent TSH was normal.      The patient has been feeling well from a cardiovascular standpoint.  This is the best I have seen the patient look in years.  He underwent blood work prior to my visit today showing his serum sodium had normalized up to 136.  His potassium is 4.9.  His creatinine has come down from a baseline of around 2 to 1.64 with a corresponding GFR of 40.      The patient is having to take his Demadex only as  needed.  He has not taken a dose since yesterday.  He remains on his amiodarone 200 mg daily which he feels that he is tolerating well.  He remains on apixaban 5 mg twice a day for stroke prophylaxis.      His last transthoracic echocardiogram showed an ejection fraction of 40%.  Due to significant fatigue, Dr. Larkin previously discontinued his beta blocker.  His ACE inhibitor has been withheld because of his chronic kidney disease.  Despite this, the patient has not had recurrent heart failure and has actually done quite well.      The patient has recently been in [place].  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  Again, he denies any chest pain, chest pressure, shortness of breath, orthopnea or paroxysmal nocturnal dyspnea.      IMPRESSION AND PLAN:   1.  Mild nonischemic cardiomyopathy:  The patient is not manifesting any signs or symptoms of heart failure at the present time.  I will plan to repeat a transthoracic echocardiogram when I see the patient back in 6 months.  On a prior cardiac catheterization, there had been concern related to possible noncompaction, although this has never been conclusively proven.  Unfortunately, the patient has not been able to tolerate beta blockers.  He is allergic to ACE inhibitors and I believe ARBs have been withheld due to his chronic kidney disease.  As his chronic kidney disease is improved, if his EF remains less than 45% when we see him back, it may be beneficial to add a low dose of losartan with close monitoring of his BMP.  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     2.  Paroxysmal atrial fibrillation.  Fortunately, the patient has been maintaining sinus rhythm.  When he maintained sinus rhythm, his heart failure resolved.  He will remain on amiodarone and apixaban  unchanged.   3.  Hypothyroidism.  This is being managed by his primary provider and has normalized with levothyroxine.   4.  Chronic kidney disease.  The patient's BMP today shows an improvement in his renal function.   5.  Right heart failure with moderate tricuspid regurgitation.  We will plan to repeat a transthoracic echocardiogram when we see the patient back in 6 months.  At this time, he is clinically manifesting no right heart failure symptoms.  He will continue to use torsemide as needed.  Fortunately, he has had to use very little torsemide in the last several months since reverting back to sinus rhythm.         TYRONE NEWBY MD             D: 2018   T: 2018   MT: JESSY      Name:     TREY BURGER   MRN:      [id number]        Account:      [id number]   :      1945           Service Date: 2018      Document: [id number]         Outpatient Encounter Medications as of 2018   Medication Sig Dispense Refill     Acetaminophen (TYLENOL PO) Take 500 mg by mouth every morning       amiodarone (PACERONE/CODARONE) 200 MG tablet Take 1 tablet (200 mg) by mouth daily 90 tablet 3     apixaban ANTICOAGULANT (ELIQUIS) 5 MG tablet Take 1 tablet (5 mg) by mouth 2 times daily 180 tablet 3     levothyroxine (SYNTHROID/LEVOTHROID) 100 MCG tablet Take 88 mcg by mouth 88mcg on all days besides        levothyroxine (SYNTHROID/LEVOTHROID) 100 MCG tablet Take 50 mcg by mouth Once a week on Fri       torsemide (DEMADEX) 5 MG tablet Take 1 tablet (5 mg) by mouth daily 1-2 tab daily. (Patient taking differently: Take 5 mg by mouth as needed 1-2 tab daily.) 90 tablet 2     VITAMIN D, CHOLECALCIFEROL, PO Take 1,000 Units by mouth daily        No facility-administered encounter medications on file as of 2018.        Again, thank you for allowing me to participate in the care of your patient.      Sincerely,    Tyrone Newby MD     [facility]

## 2018-12-21 NOTE — LETTER
12/21/2018    Kevan Bull MD  [address]  [facility] [address]    RE: Misael Fischer       Dear Colleague,    I had the pleasure of seeing Misael Fischer in the [facility].    HPI and Plan:   See dictation    Orders Placed This Encounter   Procedures     Follow-Up with Cardiologist     Echocardiogram Complete       No orders of the defined types were placed in this encounter.      There are no discontinued medications.      Encounter Diagnoses   Name Primary?     Dilated cardiomyopathy (H)      Systolic congestive heart failure (H)        CURRENT MEDICATIONS:  Current Outpatient Medications   Medication Sig Dispense Refill     Acetaminophen (TYLENOL PO) Take 500 mg by mouth every morning       amiodarone (PACERONE/CODARONE) 200 MG tablet Take 1 tablet (200 mg) by mouth daily 90 tablet 3     apixaban ANTICOAGULANT (ELIQUIS) 5 MG tablet Take 1 tablet (5 mg) by mouth 2 times daily 180 tablet 3     levothyroxine (SYNTHROID/LEVOTHROID) 100 MCG tablet Take 88 mcg by mouth 88mcg on all days besides Fridays       levothyroxine (SYNTHROID/LEVOTHROID) 100 MCG tablet Take 50 mcg by mouth Once a week on Fri       torsemide (DEMADEX) 5 MG tablet Take 1 tablet (5 mg) by mouth daily 1-2 tab daily. (Patient taking differently: Take 5 mg by mouth as needed 1-2 tab daily.) 90 tablet 2     VITAMIN D, CHOLECALCIFEROL, PO Take 1,000 Units by mouth daily          ALLERGIES     Allergies   Allergen Reactions     Ace Inhibitors      Due to CKD, renal insufficiency       PAST MEDICAL HISTORY:  Past Medical History:   Diagnosis Date     Atrial flutter (H) 11/23/15     AVNRT (AV jade re-entry tachycardia) (H)     status post     BPH (benign prostatic hyperplasia)      CAD (coronary artery disease)     Abnormal Nuc 2014     Cardiomyopathy (H)     Echo 11/2015- EF 35-40%     CHF (congestive heart failure) (H) 11/25/2015    Echo 11/2015- EF 35-40%      CKD (chronic kidney disease)     not on ACE/ARB  due to insufficiency     Elevated PSA      Encounter for cardioversion procedure 09/25/2018    1 shock with 120 joules successful      Hypercholesterolemia 7/10/2014     Malaria      Mild pulmonic regurgitation and RV dysfunction by prior echocardiogram      Non-rheumatic tricuspid valve insufficiency      Paroxysmal A-fib (H)      Persistent atrial fibrillation with rapid ventricular response (H)      Pigment deposition      Systolic heart failure (H)     Echo 11/2015- EF 35-40%     Tricuspid regurgitation     Mod 2016       PAST SURGICAL HISTORY:  Past Surgical History:   Procedure Laterality Date     ANESTHESIA CARDIOVERSION N/A 12/6/2016    Procedure: ANESTHESIA CARDIOVERSION;  Surgeon: GENERIC ANESTHESIA PROVIDER;  Location:  OR     ANESTHESIA CARDIOVERSION N/A 6/4/2018    Procedure: ANESTHESIA CARDIOVERSION;  ANESTHESIA CARDIOVERSION ;  Surgeon: GENERIC ANESTHESIA PROVIDER;  Location:  OR     ANESTHESIA CARDIOVERSION N/A 9/25/2018    Procedure: ANESTHESIA CARDIOVERSION;  ANESTHESIA CARDIOVERSION ;  Surgeon: GENERIC ANESTHESIA PROVIDER;  Location:  OR      KP (TRANSESOPHAGEAL ECHO)  12/6/16     CARDIOVERSION  12/14/15     H ABLATION ATRIAL FLUTTER  3/21/16     ORTHOPEDIC SURGERY Left 1957    foot injury repair       FAMILY HISTORY:  Family History   Problem Relation Age of Onset     C.A.D. Mother      Prostate Cancer Father 73       SOCIAL HISTORY:  Social History     Socioeconomic History     Marital status:      Spouse name: None     Number of children: None     Years of education: None     Highest education level: None   Social Needs     Financial resource strain: None     Food insecurity - worry: None     Food insecurity - inability: None     Transportation needs - medical: None     Transportation needs - non-medical: None   Occupational History     None   Tobacco Use     Smoking status: Never Smoker     Smokeless tobacco: Never Used   Substance and Sexual Activity     Alcohol use: Yes      "Comment: rare     Drug use: No     Sexual activity: Not Currently   Other Topics Concern     Parent/sibling w/ CABG, MI or angioplasty before 65F 55M? Not Asked      Service Not Asked     Blood Transfusions Not Asked     Caffeine Concern No     Occupational Exposure No     Comment: none     Hobby Hazards Not Asked     Sleep Concern No     Stress Concern No     Weight Concern No     Special Diet Yes     Comment: low salt     Back Care Not Asked     Exercise Yes     Comment: walking     Bike Helmet Not Asked     Seat Belt Yes     Self-Exams Not Asked   Social History Narrative     None       Review of Systems:  Skin:  Negative       Eyes:  Positive for glasses;cataracts cataract in right eye  ENT:  Negative      Respiratory:  Negative       Cardiovascular:  Negative      Gastroenterology: Negative      Genitourinary:  Positive for prostate problem enlarged prostate  Musculoskeletal:  Positive for joint pain knees  Neurologic:  Negative      Psychiatric:  Negative      Heme/Lymph/Imm:  Positive for allergies RX  Endocrine:  Positive for thyroid disorder hypothyroidism    Physical Exam:  Vitals: /72 (BP Location: Right arm, Patient Position: Chair, Cuff Size: Adult Regular)   Pulse 68   Ht 1.727 m (5' 8\")   Wt 64.8 kg (142 lb 12.8 oz)   BMI 21.71 kg/m       Constitutional:  cooperative;well nourished;in no acute distress        Skin:  warm and dry to the touch          Head:  normocephalic        Eyes:  sclera white        Lymph:No Cervical lymphadenopathy present     ENT:  no pallor or cyanosis        Neck:  no stiffness;JVP normal        Respiratory:  clear to auscultation         Cardiac: regular rhythm       holosystolic murmur        pulses full and equal                                        GI:  abdomen soft        Extremities and Muscular Skeletal:  no edema              Neurological:  affect appropriate        Psych:  affect appropriate, oriented to time, person and place        YAS Omer" Tea Santos, APRN CNP  [address]  [place] [id number]                Thank you for allowing me to participate in the care of your patient.      Sincerely,     Prabhakar Castro MD     [facility]    cc:   Negra Santos, APRN CNP  [address]  [place] [id number]

## 2018-12-21 NOTE — PROGRESS NOTES
HPI and Plan:   See dictation    Orders Placed This Encounter   Procedures     Follow-Up with Cardiologist     Echocardiogram Complete       No orders of the defined types were placed in this encounter.      There are no discontinued medications.      Encounter Diagnoses   Name Primary?     Dilated cardiomyopathy (H)      Systolic congestive heart failure (H)        CURRENT MEDICATIONS:  Current Outpatient Medications   Medication Sig Dispense Refill     Acetaminophen (TYLENOL PO) Take 500 mg by mouth every morning       amiodarone (PACERONE/CODARONE) 200 MG tablet Take 1 tablet (200 mg) by mouth daily 90 tablet 3     apixaban ANTICOAGULANT (ELIQUIS) 5 MG tablet Take 1 tablet (5 mg) by mouth 2 times daily 180 tablet 3     levothyroxine (SYNTHROID/LEVOTHROID) 100 MCG tablet Take 88 mcg by mouth 88mcg on all days besides Fridays       levothyroxine (SYNTHROID/LEVOTHROID) 100 MCG tablet Take 50 mcg by mouth Once a week on Fri       torsemide (DEMADEX) 5 MG tablet Take 1 tablet (5 mg) by mouth daily 1-2 tab daily. (Patient taking differently: Take 5 mg by mouth as needed 1-2 tab daily.) 90 tablet 2     VITAMIN D, CHOLECALCIFEROL, PO Take 1,000 Units by mouth daily          ALLERGIES     Allergies   Allergen Reactions     Ace Inhibitors      Due to CKD, renal insufficiency       PAST MEDICAL HISTORY:  Past Medical History:   Diagnosis Date     Atrial flutter (H) 11/23/15     AVNRT (AV jade re-entry tachycardia) (H)     status post     BPH (benign prostatic hyperplasia)      CAD (coronary artery disease)     Abnormal Nuc 2014     Cardiomyopathy (H)     Echo 11/2015- EF 35-40%     CHF (congestive heart failure) (H) 11/25/2015    Echo 11/2015- EF 35-40%      CKD (chronic kidney disease)     not on ACE/ARB due to insufficiency     Elevated PSA      Encounter for cardioversion procedure 09/25/2018    1 shock with 120 joules successful      Hypercholesterolemia 7/10/2014     Malaria      Mild pulmonic regurgitation and RV  dysfunction by prior echocardiogram      Non-rheumatic tricuspid valve insufficiency      Paroxysmal A-fib (H)      Persistent atrial fibrillation with rapid ventricular response (H)      Pigment deposition      Systolic heart failure (H)     Echo 11/2015- EF 35-40%     Tricuspid regurgitation     Mod 2016       PAST SURGICAL HISTORY:  Past Surgical History:   Procedure Laterality Date     ANESTHESIA CARDIOVERSION N/A 12/6/2016    Procedure: ANESTHESIA CARDIOVERSION;  Surgeon: GENERIC ANESTHESIA PROVIDER;  Location: [facility] OR     ANESTHESIA CARDIOVERSION N/A 6/4/2018    Procedure: ANESTHESIA CARDIOVERSION;  ANESTHESIA CARDIOVERSION ;  Surgeon: GENERIC ANESTHESIA PROVIDER;  Location: [facility] OR     ANESTHESIA CARDIOVERSION N/A 9/25/2018    Procedure: ANESTHESIA CARDIOVERSION;  ANESTHESIA CARDIOVERSION ;  Surgeon: GENERIC ANESTHESIA PROVIDER;  Location: [facility] OR     C KP (TRANSESOPHAGEAL ECHO)  12/6/16     CARDIOVERSION  12/14/15     H ABLATION ATRIAL FLUTTER  3/21/16     ORTHOPEDIC SURGERY Left 1957    foot injury repair       FAMILY HISTORY:  Family History   Problem Relation Age of Onset     C.A.D. Mother      Prostate Cancer Father 73       SOCIAL HISTORY:  Social History     Socioeconomic History     Marital status:      Spouse name: None     Number of children: None     Years of education: None     Highest education level: None   Social Needs     Financial resource strain: None     Food insecurity - worry: None     Food insecurity - inability: None     Transportation needs - medical: None     Transportation needs - non-medical: None   Occupational History     None   Tobacco Use     Smoking status: Never Smoker     Smokeless tobacco: Never Used   Substance and Sexual Activity     Alcohol use: Yes     Comment: rare     Drug use: No     Sexual activity: Not Currently   Other Topics Concern     Parent/sibling w/ CABG, MI or angioplasty before 65F 55M? Not Asked      Service Not Asked     Blood Transfusions Not  "Asked     Caffeine Concern No     Occupational Exposure No     Comment: none     Hobby Hazards Not Asked     Sleep Concern No     Stress Concern No     Weight Concern No     Special Diet Yes     Comment: low salt     Back Care Not Asked     Exercise Yes     Comment: walking     Bike Helmet Not Asked     Seat Belt Yes     Self-Exams Not Asked   Social History Narrative     None       Review of Systems:  Skin:  Negative       Eyes:  Positive for glasses;cataracts cataract in right eye  ENT:  Negative      Respiratory:  Negative       Cardiovascular:  Negative      Gastroenterology: Negative      Genitourinary:  Positive for prostate problem enlarged prostate  Musculoskeletal:  Positive for joint pain knees  Neurologic:  Negative      Psychiatric:  Negative      Heme/Lymph/Imm:  Positive for allergies RX  Endocrine:  Positive for thyroid disorder hypothyroidism    Physical Exam:  Vitals: /72 (BP Location: Right arm, Patient Position: Chair, Cuff Size: Adult Regular)   Pulse 68   Ht 1.727 m (5' 8\")   Wt 64.8 kg (142 lb 12.8 oz)   BMI 21.71 kg/m      Constitutional:  cooperative;well nourished;in no acute distress        Skin:  warm and dry to the touch          Head:  normocephalic        Eyes:  sclera white        Lymph:No Cervical lymphadenopathy present     ENT:  no pallor or cyanosis        Neck:  no stiffness;JVP normal        Respiratory:  clear to auscultation         Cardiac: regular rhythm       holosystolic murmur        pulses full and equal                                        GI:  abdomen soft        Extremities and Muscular Skeletal:  no edema              Neurological:  affect appropriate        Psych:  affect appropriate, oriented to time, person and place        YAS Santos, APRN CNP  [address]  [place] [address]              "

## 2018-12-21 NOTE — PROGRESS NOTES
Service Date: 12/21/2018      CARDIOLOGY NOTE      HISTORY OF PRESENT ILLNESS:  Mr. Fischer is a pleasant 73-year-old gentleman with a history of atrial flutter status post atrial flutter ablation, tachycardia-induced cardiomyopathy with an eventual improvement in his ejection fraction into the roughly 40% range, RV dysfunction of unknown etiology for which he has previously been followed at the [facility], moderate-to-severe tricuspid regurgitation related to RV annular dilatation, chronic kidney disease for which I have previously tried to refer him to Nephrology, trivial nonobstructive coronary artery disease noted on a left heart catheterization and recurrent atrial fibrillation which has been suppressed with amiodarone therapy who returns in General Cardiology followup.  I saw the patient last on 12/04/2017.      The patient has been seen by EP Cardiology multiple times this year due to recurrent atrial fibrillation.  Complicating his care was hypothyroidism developing from his amiodarone.  The amiodarone was initially discontinued; however, the patient's atrial fibrillation recurred and he has been placed back on amiodarone and underwent cardioversion.  He has now maintained sinus rhythm and has done well since that time.  He has been placed on levothyroxine and his most recent TSH was normal.      The patient has been feeling well from a cardiovascular standpoint.  This is the best I have seen the patient look in years.  He underwent blood work prior to my visit today showing his serum sodium had normalized up to 136.  His potassium is 4.9.  His creatinine has come down from a baseline of around 2 to 1.64 with a corresponding GFR of 40.      The patient is having to take his Demadex only as needed.  He has not taken a dose since yesterday.  He remains on his amiodarone 200 mg daily which he feels that he is tolerating well.  He remains on apixaban 5 mg twice a day for stroke prophylaxis.      His last  transthoracic echocardiogram showed an ejection fraction of 40%.  Due to significant fatigue, Dr. Larkin previously discontinued his beta blocker.  His ACE inhibitor has been withheld because of his chronic kidney disease.  Despite this, the patient has not had recurrent heart failure and has actually done quite well.      The patient has recently been in [place].  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  Again, he denies any chest pain, chest pressure, shortness of breath, orthopnea or paroxysmal nocturnal dyspnea.      IMPRESSION AND PLAN:   1.  Mild nonischemic cardiomyopathy:  The patient is not manifesting any signs or symptoms of heart failure at the present time.  I will plan to repeat a transthoracic echocardiogram when I see the patient back in 6 months.  On a prior cardiac catheterization, there had been concern related to possible noncompaction, although this has never been conclusively proven.  Unfortunately, the patient has not been able to tolerate beta blockers.  He is allergic to ACE inhibitors and I believe ARBs have been withheld due to his chronic kidney disease.  As his chronic kidney disease is improved, if his EF remains less than 45% when we see him back, it may be beneficial to add a low dose of losartan with close monitoring of his BMP.  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     2.  Paroxysmal atrial fibrillation.  Fortunately, the patient has been maintaining sinus rhythm.  When he maintained sinus rhythm, his heart failure resolved.  He will remain on amiodarone and apixaban unchanged.   3.  Hypothyroidism.  This is being managed by his primary provider and has normalized with levothyroxine.   4.  Chronic kidney disease.  The patient's BMP today shows an improvement in his renal  function.   5.  Right heart failure with moderate tricuspid regurgitation.  We will plan to repeat a transthoracic echocardiogram when we see the patient back in 6 months.  At this time, he is clinically manifesting no right heart failure symptoms.  He will continue to use torsemide as needed.  Fortunately, he has had to use very little torsemide in the last several months since reverting back to sinus rhythm.         TYRONE NEWBY MD             D: 2018   T: 2018   MT: JESSY      Name:     TREY BURGER   MRN:      [id number]        Account:      [id number]   :      1945           Service Date: 2018      Document: [id number]

## 2019-01-16 DIAGNOSIS — I36.1 NON-RHEUMATIC TRICUSPID VALVE INSUFFICIENCY: ICD-10-CM

## 2019-01-16 RX ORDER — TORSEMIDE 5 MG/1
5 TABLET ORAL PRN
Qty: 90 TABLET | Refills: 2 | Status: SHIPPED | OUTPATIENT
Start: 2019-01-16 | End: 2019-01-21

## 2019-01-21 RX ORDER — TORSEMIDE 5 MG/1
TABLET ORAL
Qty: 90 TABLET | Refills: 2 | Status: SHIPPED | OUTPATIENT
Start: 2019-01-21 | End: 2019-04-11

## 2019-03-27 ENCOUNTER — OFFICE VISIT (OUTPATIENT)
Dept: CARDIOLOGY | Facility: CLINIC | Age: 74
End: 2019-03-27
Attending: PHYSICIAN ASSISTANT
Payer: COMMERCIAL

## 2019-03-27 VITALS
DIASTOLIC BLOOD PRESSURE: 73 MMHG | WEIGHT: 149 LBS | SYSTOLIC BLOOD PRESSURE: 125 MMHG | HEART RATE: 62 BPM | HEIGHT: 68 IN | BODY MASS INDEX: 22.58 KG/M2

## 2019-03-27 DIAGNOSIS — Z79.899 ON AMIODARONE THERAPY: ICD-10-CM

## 2019-03-27 DIAGNOSIS — I42.0 DILATED CARDIOMYOPATHY (H): Primary | ICD-10-CM

## 2019-03-27 DIAGNOSIS — I48.3 TYPICAL ATRIAL FLUTTER (H): ICD-10-CM

## 2019-03-27 DIAGNOSIS — I48.0 PAROXYSMAL ATRIAL FIBRILLATION (H): ICD-10-CM

## 2019-03-27 LAB
ALBUMIN SERPL-MCNC: 3.9 G/DL (ref 3.4–5)
ALP SERPL-CCNC: 156 U/L (ref 40–150)
ALT SERPL W P-5'-P-CCNC: 52 U/L (ref 0–70)
ANION GAP SERPL CALCULATED.3IONS-SCNC: 13.1 MMOL/L (ref 6–17)
AST SERPL W P-5'-P-CCNC: 46 U/L (ref 0–45)
BILIRUB DIRECT SERPL-MCNC: 0.5 MG/DL (ref 0–0.2)
BILIRUB SERPL-MCNC: 1 MG/DL (ref 0.2–1.3)
BUN SERPL-MCNC: 41 MG/DL (ref 7–30)
CALCIUM SERPL-MCNC: 9.8 MG/DL (ref 8.5–10.5)
CHLORIDE SERPL-SCNC: 101 MMOL/L (ref 98–107)
CO2 SERPL-SCNC: 28 MMOL/L (ref 23–29)
CREAT SERPL-MCNC: 2.18 MG/DL (ref 0.7–1.3)
GFR SERPL CREATININE-BSD FRML MDRD: 30 ML/MIN/{1.73_M2}
GLUCOSE SERPL-MCNC: 117 MG/DL (ref 70–105)
POTASSIUM SERPL-SCNC: 5.1 MMOL/L (ref 3.5–5.1)
PROT SERPL-MCNC: 7.5 G/DL (ref 6.8–8.8)
SODIUM SERPL-SCNC: 137 MMOL/L (ref 136–145)
TSH SERPL DL<=0.005 MIU/L-ACNC: 2.01 MU/L (ref 0.4–4)

## 2019-03-27 PROCEDURE — 80076 HEPATIC FUNCTION PANEL: CPT | Performed by: PHYSICIAN ASSISTANT

## 2019-03-27 PROCEDURE — 84443 ASSAY THYROID STIM HORMONE: CPT | Performed by: PHYSICIAN ASSISTANT

## 2019-03-27 PROCEDURE — 80048 BASIC METABOLIC PNL TOTAL CA: CPT | Performed by: PHYSICIAN ASSISTANT

## 2019-03-27 PROCEDURE — 36415 COLL VENOUS BLD VENIPUNCTURE: CPT | Performed by: PHYSICIAN ASSISTANT

## 2019-03-27 PROCEDURE — 99214 OFFICE O/P EST MOD 30 MIN: CPT | Performed by: INTERNAL MEDICINE

## 2019-03-27 PROCEDURE — 93000 ELECTROCARDIOGRAM COMPLETE: CPT | Performed by: INTERNAL MEDICINE

## 2019-03-27 ASSESSMENT — MIFFLIN-ST. JEOR: SCORE: 1385.36

## 2019-03-27 NOTE — LETTER
3/27/2019    Kevan Bull MD  [address]  [facility] [address]    RE: Misael Fischer       Dear Colleague,    I had the pleasure of seeing Misael YESENIA Fischer in the [facility].    HPI and Plan:   Thank you for allowing me to participate in care of this very delightful patient.  As you know, Paul is a 73-year-old gentleman with a history of asymptomatic PVCs and eventually developed paroxysmal atrial flutter and atrial fibrillation likely associated with severe LV dysfunction.  Patient is also noted to have moderate RV dysfunction as well with moderate to severe TR.  He underwent successful ablation for right-sided atrial flutter in conjunction with medical therapy including amiodarone for his atrial fibrillation LV function did improve with the latest estimation of 40% for EF.  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    I last saw the patient last June.  Over the interim.  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  Actually, he felt the best when he was over there with less pedal edema but as soon as he returned he was noted pedal edema in the fatigue.  Recently he had increased his baseline torsemide from 5 mg to 10 mg.  As result creatinine went up a little bit from his baseline.  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  150 is considered on the high normal range for alkaline phosphatase and 45 for AST.  ALT was within normal range.    In reviewing my previous notes it was unclear why I did not discussed the option of A. fib ablation for this patient.  Nevertheless, since the patient is doing well I hate to rock the boat at this point in time by continue with amiodarone.   However, I would like to repeat electrolytes, echocardiogram and a Vandana patch monitor in a few weeks time.  If indeed the patient has evidence of atrial fibrillation as he does not have traditional symptoms such as palpitations but is rather fatigued than I would have a low threshold to consider A. fib ablation.  He has been on Eliquis without any complications.    I will refer the patient to see 1 of our noninvasive cardiologists for follow-up as well as his primary cardiologist is soon to leave our practice.  Orders Placed This Encounter   Procedures     Basic metabolic panel     Follow-Up with Cardiologist     EKG 12-lead complete w/read - Clinics (performed today)     Zio Patch Holter Adult Pediatric Greater than 48 hrs     Echocardiogram Complete       No orders of the defined types were placed in this encounter.      Medications Discontinued During This Encounter   Medication Reason     levothyroxine (SYNTHROID/LEVOTHROID) 100 MCG tablet Medication Reconciliation Clean Up         Encounter Diagnoses   Name Primary?     Typical atrial flutter (H)      On amiodarone therapy      Dilated cardiomyopathy (H) Yes       CURRENT MEDICATIONS:  Current Outpatient Medications   Medication Sig Dispense Refill     Acetaminophen (TYLENOL PO) Take 500 mg by mouth every morning       amiodarone (PACERONE/CODARONE) 200 MG tablet Take 1 tablet (200 mg) by mouth daily 90 tablet 3     apixaban ANTICOAGULANT (ELIQUIS) 5 MG tablet Take 1 tablet (5 mg) by mouth 2 times daily 180 tablet 3     levothyroxine (SYNTHROID/LEVOTHROID) 100 MCG tablet Take 88 mcg by mouth 88mcg on all days; 44mcg on Fridays       torsemide (DEMADEX) 5 MG tablet Take 1 tablet (5 mg) by mouth as needed. 90 tablet 2     VITAMIN D, CHOLECALCIFEROL, PO Take 1,000 Units by mouth daily          ALLERGIES     Allergies   Allergen Reactions     Ace Inhibitors      Due to CKD, renal insufficiency     Pravastatin      Muscle aches       PAST MEDICAL HISTORY:  Past  Medical History:   Diagnosis Date     Atrial flutter (H) 11/23/15     AVNRT (AV jade re-entry tachycardia) (H)     status post     BPH (benign prostatic hyperplasia)      CAD (coronary artery disease)     Abnormal Nuc 2014     Cardiomyopathy (H)     Echo 11/2015- EF 35-40%     CHF (congestive heart failure) (H) 11/25/2015    Echo 11/2015- EF 35-40%      CKD (chronic kidney disease)     not on ACE/ARB due to insufficiency     Elevated PSA      Encounter for cardioversion procedure 09/25/2018    1 shock with 120 joules successful      Hypercholesterolemia 7/10/2014     Malaria      Mild pulmonic regurgitation and RV dysfunction by prior echocardiogram      Non-rheumatic tricuspid valve insufficiency      Paroxysmal A-fib (H)      Persistent atrial fibrillation with rapid ventricular response (H)      Pigment deposition      Systolic heart failure (H)     Echo 11/2015- EF 35-40%     Tricuspid regurgitation     Mod 2016       PAST SURGICAL HISTORY:  Past Surgical History:   Procedure Laterality Date     ANESTHESIA CARDIOVERSION N/A 12/6/2016    Procedure: ANESTHESIA CARDIOVERSION;  Surgeon: GENERIC ANESTHESIA PROVIDER;  Location:  OR     ANESTHESIA CARDIOVERSION N/A 6/4/2018    Procedure: ANESTHESIA CARDIOVERSION;  ANESTHESIA CARDIOVERSION ;  Surgeon: GENERIC ANESTHESIA PROVIDER;  Location:  OR     ANESTHESIA CARDIOVERSION N/A 9/25/2018    Procedure: ANESTHESIA CARDIOVERSION;  ANESTHESIA CARDIOVERSION ;  Surgeon: GENERIC ANESTHESIA PROVIDER;  Location:  OR     C KP (TRANSESOPHAGEAL ECHO)  12/6/16     CARDIOVERSION  12/14/15     H ABLATION ATRIAL FLUTTER  3/21/16     ORTHOPEDIC SURGERY Left 1957    foot injury repair       FAMILY HISTORY:  Family History   Problem Relation Age of Onset     C.A.D. Mother      Prostate Cancer Father 73       SOCIAL HISTORY:  Social History     Socioeconomic History     Marital status:      Spouse name: None     Number of children: None     Years of education: None      Highest education level: None   Occupational History     None   Social Needs     Financial resource strain: None     Food insecurity:     Worry: None     Inability: None     Transportation needs:     Medical: None     Non-medical: None   Tobacco Use     Smoking status: Never Smoker     Smokeless tobacco: Never Used   Substance and Sexual Activity     Alcohol use: Yes     Comment: rare     Drug use: No     Sexual activity: Not Currently   Lifestyle     Physical activity:     Days per week: None     Minutes per session: None     Stress: None   Relationships     Social connections:     Talks on phone: None     Gets together: None     Attends Orthodox service: None     Active member of club or organization: None     Attends meetings of clubs or organizations: None     Relationship status: None     Intimate partner violence:     Fear of current or ex partner: None     Emotionally abused: None     Physically abused: None     Forced sexual activity: None   Other Topics Concern     Parent/sibling w/ CABG, MI or angioplasty before 65F 55M? Not Asked      Service Not Asked     Blood Transfusions Not Asked     Caffeine Concern No     Occupational Exposure No     Comment: none     Hobby Hazards Not Asked     Sleep Concern No     Stress Concern No     Weight Concern No     Special Diet Yes     Comment: low salt     Back Care Not Asked     Exercise Yes     Comment: walking     Bike Helmet Not Asked     Seat Belt Yes     Self-Exams Not Asked   Social History Narrative     None       Review of Systems:  Skin:  Negative       Eyes:  Positive for glasses;cataracts cataract in right eye  ENT:  Negative      Respiratory:  Negative       Cardiovascular:    edema;Positive for mostly controlled with torsemide  Gastroenterology: Negative      Genitourinary:  Positive for prostate problem enlarged prostate  Musculoskeletal:  Positive for joint pain knees  Neurologic:  Negative      Psychiatric:  Negative      Heme/Lymph/Imm:   "Positive for allergies RX  Endocrine:  Positive for thyroid disorder hypothyroidism    Physical Exam:  Vitals: /73   Pulse 62   Ht 1.727 m (5' 8\")   Wt 67.6 kg (149 lb)   BMI 22.66 kg/m       Constitutional:  cooperative, alert and oriented, well developed, well nourished, in no acute distress        Skin:  warm and dry to the touch, no apparent skin lesions or masses noted          Head:  normocephalic, no masses or lesions        Eyes:  pupils equal and round, conjunctivae and lids unremarkable, sclera white, no xanthalasma, EOMS intact, no nystagmus        Lymph:      ENT:  no pallor or cyanosis, dentition good        Neck:  carotid pulses are full and equal bilaterally, JVP normal, no carotid bruit        Respiratory:  normal breath sounds, clear to auscultation, normal A-P diameter, normal symmetry, normal respiratory excursion, no use of accessory muscles         Cardiac: regular rhythm, normal S1/S2, no S3 or S4, apical impulse not displaced, no murmurs, gallops or rubs occasional premature beats                                                       GI:  abdomen soft, non-tender, BS normoactive, no mass, no HSM, no bruits        Extremities and Muscular Skeletal:  no deformities, clubbing, cyanosis, erythema observed              Neurological:  no gross motor deficits        Psych:           Thank you for allowing me to participate in the care of your patient.    Sincerely,     Ivan Anderson MD     [facility]    "

## 2019-03-27 NOTE — LETTER
3/27/2019    Kevan Bull MD  [address]  [facility] [place] [address]    RE: Misael Fischer       Dear Colleague,    I had the pleasure of seeing Misael YESENIA Fischer in the [facility].    HPI and Plan:   Thank you for allowing me to participate in care of this very delightful patient.  As you know, Paul is a 73-year-old gentleman with a history of asymptomatic PVCs and eventually developed paroxysmal atrial flutter and atrial fibrillation likely associated with severe LV dysfunction.  Patient is also noted to have moderate RV dysfunction as well with moderate to severe TR.  He underwent successful ablation for right-sided atrial flutter in conjunction with medical therapy including amiodarone for his atrial fibrillation LV function did improve with the latest estimation of 40% for EF.  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    I last saw the patient last June.  Over the interim.  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  Actually, he felt the best when he was over there with less pedal edema but as soon as he returned he was noted pedal edema in the fatigue.  Recently he had increased his baseline torsemide from 5 mg to 10 mg.  As result creatinine went up a little bit from his baseline.  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  150 is considered on the high normal range for alkaline phosphatase and 45 for AST.  ALT was within normal range.    In reviewing my previous notes it was unclear why I did not discussed the option of A. fib ablation for this patient.  Nevertheless, since the patient is doing well I hate to rock the boat at this point in time by continue with amiodarone.   However, I would like to repeat electrolytes, echocardiogram and a Vandana patch monitor in a few weeks time.  If indeed the patient has evidence of atrial fibrillation as he does not have traditional symptoms such as palpitations but is rather fatigued than I would have a low threshold to consider A. fib ablation.  He has been on Eliquis without any complications.    I will refer the patient to see 1 of our noninvasive cardiologists for follow-up as well as his primary cardiologist is soon to leave our practice.  Orders Placed This Encounter   Procedures     Basic metabolic panel     Follow-Up with Cardiologist     EKG 12-lead complete w/read - Clinics (performed today)     Zio Patch Holter Adult Pediatric Greater than 48 hrs     Echocardiogram Complete       No orders of the defined types were placed in this encounter.      Medications Discontinued During This Encounter   Medication Reason     levothyroxine (SYNTHROID/LEVOTHROID) 100 MCG tablet Medication Reconciliation Clean Up         Encounter Diagnoses   Name Primary?     Typical atrial flutter (H)      On amiodarone therapy      Dilated cardiomyopathy (H) Yes       CURRENT MEDICATIONS:  Current Outpatient Medications   Medication Sig Dispense Refill     Acetaminophen (TYLENOL PO) Take 500 mg by mouth every morning       amiodarone (PACERONE/CODARONE) 200 MG tablet Take 1 tablet (200 mg) by mouth daily 90 tablet 3     apixaban ANTICOAGULANT (ELIQUIS) 5 MG tablet Take 1 tablet (5 mg) by mouth 2 times daily 180 tablet 3     levothyroxine (SYNTHROID/LEVOTHROID) 100 MCG tablet Take 88 mcg by mouth 88mcg on all days; 44mcg on Fridays       torsemide (DEMADEX) 5 MG tablet Take 1 tablet (5 mg) by mouth as needed. 90 tablet 2     VITAMIN D, CHOLECALCIFEROL, PO Take 1,000 Units by mouth daily          ALLERGIES     Allergies   Allergen Reactions     Ace Inhibitors      Due to CKD, renal insufficiency     Pravastatin      Muscle aches       PAST MEDICAL HISTORY:  Past  Medical History:   Diagnosis Date     Atrial flutter (H) 11/23/15     AVNRT (AV jade re-entry tachycardia) (H)     status post     BPH (benign prostatic hyperplasia)      CAD (coronary artery disease)     Abnormal Nuc 2014     Cardiomyopathy (H)     Echo 11/2015- EF 35-40%     CHF (congestive heart failure) (H) 11/25/2015    Echo 11/2015- EF 35-40%      CKD (chronic kidney disease)     not on ACE/ARB due to insufficiency     Elevated PSA      Encounter for cardioversion procedure 09/25/2018    1 shock with 120 joules successful      Hypercholesterolemia 7/10/2014     Malaria      Mild pulmonic regurgitation and RV dysfunction by prior echocardiogram      Non-rheumatic tricuspid valve insufficiency      Paroxysmal A-fib (H)      Persistent atrial fibrillation with rapid ventricular response (H)      Pigment deposition      Systolic heart failure (H)     Echo 11/2015- EF 35-40%     Tricuspid regurgitation     Mod 2016       PAST SURGICAL HISTORY:  Past Surgical History:   Procedure Laterality Date     ANESTHESIA CARDIOVERSION N/A 12/6/2016    Procedure: ANESTHESIA CARDIOVERSION;  Surgeon: GENERIC ANESTHESIA PROVIDER;  Location:  OR     ANESTHESIA CARDIOVERSION N/A 6/4/2018    Procedure: ANESTHESIA CARDIOVERSION;  ANESTHESIA CARDIOVERSION ;  Surgeon: GENERIC ANESTHESIA PROVIDER;  Location:  OR     ANESTHESIA CARDIOVERSION N/A 9/25/2018    Procedure: ANESTHESIA CARDIOVERSION;  ANESTHESIA CARDIOVERSION ;  Surgeon: GENERIC ANESTHESIA PROVIDER;  Location:  OR     C KP (TRANSESOPHAGEAL ECHO)  12/6/16     CARDIOVERSION  12/14/15     H ABLATION ATRIAL FLUTTER  3/21/16     ORTHOPEDIC SURGERY Left 1957    foot injury repair       FAMILY HISTORY:  Family History   Problem Relation Age of Onset     C.A.D. Mother      Prostate Cancer Father 73       SOCIAL HISTORY:  Social History     Socioeconomic History     Marital status:      Spouse name: None     Number of children: None     Years of education: None      Highest education level: None   Occupational History     None   Social Needs     Financial resource strain: None     Food insecurity:     Worry: None     Inability: None     Transportation needs:     Medical: None     Non-medical: None   Tobacco Use     Smoking status: Never Smoker     Smokeless tobacco: Never Used   Substance and Sexual Activity     Alcohol use: Yes     Comment: rare     Drug use: No     Sexual activity: Not Currently   Lifestyle     Physical activity:     Days per week: None     Minutes per session: None     Stress: None   Relationships     Social connections:     Talks on phone: None     Gets together: None     Attends [employer] service: None     Active member of club or organization: None     Attends meetings of clubs or organizations: None     Relationship status: None     Intimate partner violence:     Fear of current or ex partner: None     Emotionally abused: None     Physically abused: None     Forced sexual activity: None   Other Topics Concern     Parent/sibling w/ CABG, MI or angioplasty before 65F 55M? Not Asked      Service Not Asked     Blood Transfusions Not Asked     Caffeine Concern No     Occupational Exposure No     Comment: none     Hobby Hazards Not Asked     Sleep Concern No     Stress Concern No     Weight Concern No     Special Diet Yes     Comment: low salt     Back Care Not Asked     Exercise Yes     Comment: walking     Bike Helmet Not Asked     Seat Belt Yes     Self-Exams Not Asked   Social History Narrative     None       Review of Systems:  Skin:  Negative       Eyes:  Positive for glasses;cataracts cataract in right eye  ENT:  Negative      Respiratory:  Negative       Cardiovascular:    edema;Positive for mostly controlled with torsemide  Gastroenterology: Negative      Genitourinary:  Positive for prostate problem enlarged prostate  Musculoskeletal:  Positive for joint pain knees  Neurologic:  Negative      Psychiatric:  Negative      Heme/Lymph/Imm:   "Positive for allergies RX  Endocrine:  Positive for thyroid disorder hypothyroidism    Physical Exam:  Vitals: /73   Pulse 62   Ht 1.727 m (5' 8\")   Wt 67.6 kg (149 lb)   BMI 22.66 kg/m       Constitutional:  cooperative, alert and oriented, well developed, well nourished, in no acute distress        Skin:  warm and dry to the touch, no apparent skin lesions or masses noted          Head:  normocephalic, no masses or lesions        Eyes:  pupils equal and round, conjunctivae and lids unremarkable, sclera white, no xanthalasma, EOMS intact, no nystagmus        Lymph:      ENT:  no pallor or cyanosis, dentition good        Neck:  carotid pulses are full and equal bilaterally, JVP normal, no carotid bruit        Respiratory:  normal breath sounds, clear to auscultation, normal A-P diameter, normal symmetry, normal respiratory excursion, no use of accessory muscles         Cardiac: regular rhythm, normal S1/S2, no S3 or S4, apical impulse not displaced, no murmurs, gallops or rubs occasional premature beats                                                       GI:  abdomen soft, non-tender, BS normoactive, no mass, no HSM, no bruits        Extremities and Muscular Skeletal:  no deformities, clubbing, cyanosis, erythema observed              Neurological:  no gross motor deficits        Psych:           CC  Stephanie Jaimes PA-C  [address]  [place] [address]                Thank you for allowing me to participate in the care of your patient.      Sincerely,     Ivan Anderson MD     [facility]    cc:   Stephanie Jaimes PA-C  [address]  [place] [address]        "

## 2019-03-27 NOTE — PROGRESS NOTES
HPI and Plan:   Thank you for allowing me to participate in care of this very delightful patient.  As you know, Paul is a 73-year-old gentleman with a history of asymptomatic PVCs and eventually developed paroxysmal atrial flutter and atrial fibrillation likely associated with severe LV dysfunction.  Patient is also noted to have moderate RV dysfunction as well with moderate to severe TR.  He underwent successful ablation for right-sided atrial flutter in conjunction with medical therapy including amiodarone for his atrial fibrillation LV function did improve with the latest estimation of 40% for EF.  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    I last saw the patient last June.  Over the interim.  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  Actually, he felt the best when he was over there with less pedal edema but as soon as he returned he was noted pedal edema in the fatigue.  Recently he had increased his baseline torsemide from 5 mg to 10 mg.  As result creatinine went up a little bit from his baseline.  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  150 is considered on the high normal range for alkaline phosphatase and 45 for AST.  ALT was within normal range.    In reviewing my previous notes it was unclear why I did not discussed the option of A. fib ablation for this patient.  Nevertheless, since the patient is doing well I hate to rock the boat at this point in time by continue with amiodarone.  However, I would like to repeat electrolytes, echocardiogram and a Vandana patch monitor in a few weeks time.  If indeed the patient has evidence of atrial fibrillation as he does not have traditional symptoms such as  palpitations but is rather fatigued than I would have a low threshold to consider A. fib ablation.  He has been on Eliquis without any complications.    I will refer the patient to see 1 of our noninvasive cardiologists for follow-up as well as his primary cardiologist is soon to leave our practice.  Orders Placed This Encounter   Procedures     Basic metabolic panel     Follow-Up with Cardiologist     EKG 12-lead complete w/read - Clinics (performed today)     Zio Patch Holter Adult Pediatric Greater than 48 hrs     Echocardiogram Complete       No orders of the defined types were placed in this encounter.      Medications Discontinued During This Encounter   Medication Reason     levothyroxine (SYNTHROID/LEVOTHROID) 100 MCG tablet Medication Reconciliation Clean Up         Encounter Diagnoses   Name Primary?     Typical atrial flutter (H)      On amiodarone therapy      Dilated cardiomyopathy (H) Yes       CURRENT MEDICATIONS:  Current Outpatient Medications   Medication Sig Dispense Refill     Acetaminophen (TYLENOL PO) Take 500 mg by mouth every morning       amiodarone (PACERONE/CODARONE) 200 MG tablet Take 1 tablet (200 mg) by mouth daily 90 tablet 3     apixaban ANTICOAGULANT (ELIQUIS) 5 MG tablet Take 1 tablet (5 mg) by mouth 2 times daily 180 tablet 3     levothyroxine (SYNTHROID/LEVOTHROID) 100 MCG tablet Take 88 mcg by mouth 88mcg on all days; 44mcg on Fridays       torsemide (DEMADEX) 5 MG tablet Take 1 tablet (5 mg) by mouth as needed. 90 tablet 2     VITAMIN D, CHOLECALCIFEROL, PO Take 1,000 Units by mouth daily          ALLERGIES     Allergies   Allergen Reactions     Ace Inhibitors      Due to CKD, renal insufficiency     Pravastatin      Muscle aches       PAST MEDICAL HISTORY:  Past Medical History:   Diagnosis Date     Atrial flutter (H) 11/23/15     AVNRT (AV jade re-entry tachycardia) (H)     status post     BPH (benign prostatic hyperplasia)      CAD (coronary artery disease)     Abnormal Nuc  2014     Cardiomyopathy (H)     Echo 11/2015- EF 35-40%     CHF (congestive heart failure) (H) 11/25/2015    Echo 11/2015- EF 35-40%      CKD (chronic kidney disease)     not on ACE/ARB due to insufficiency     Elevated PSA      Encounter for cardioversion procedure 09/25/2018    1 shock with 120 joules successful      Hypercholesterolemia 7/10/2014     Malaria      Mild pulmonic regurgitation and RV dysfunction by prior echocardiogram      Non-rheumatic tricuspid valve insufficiency      Paroxysmal A-fib (H)      Persistent atrial fibrillation with rapid ventricular response (H)      Pigment deposition      Systolic heart failure (H)     Echo 11/2015- EF 35-40%     Tricuspid regurgitation     Mod 2016       PAST SURGICAL HISTORY:  Past Surgical History:   Procedure Laterality Date     ANESTHESIA CARDIOVERSION N/A 12/6/2016    Procedure: ANESTHESIA CARDIOVERSION;  Surgeon: GENERIC ANESTHESIA PROVIDER;  Location:  OR     ANESTHESIA CARDIOVERSION N/A 6/4/2018    Procedure: ANESTHESIA CARDIOVERSION;  ANESTHESIA CARDIOVERSION ;  Surgeon: GENERIC ANESTHESIA PROVIDER;  Location: [facility] OR     ANESTHESIA CARDIOVERSION N/A 9/25/2018    Procedure: ANESTHESIA CARDIOVERSION;  ANESTHESIA CARDIOVERSION ;  Surgeon: GENERIC ANESTHESIA PROVIDER;  Location: [facility] OR     C KP (TRANSESOPHAGEAL ECHO)  12/6/16     CARDIOVERSION  12/14/15     H ABLATION ATRIAL FLUTTER  3/21/16     ORTHOPEDIC SURGERY Left 1957    foot injury repair       FAMILY HISTORY:  Family History   Problem Relation Age of Onset     C.A.D. Mother      Prostate Cancer Father 73       SOCIAL HISTORY:  Social History     Socioeconomic History     Marital status:      Spouse name: None     Number of children: None     Years of education: None     Highest education level: None   Occupational History     None   Social Needs     Financial resource strain: None     Food insecurity:     Worry: None     Inability: None     Transportation needs:     Medical: None      "Non-medical: None   Tobacco Use     Smoking status: Never Smoker     Smokeless tobacco: Never Used   Substance and Sexual Activity     Alcohol use: Yes     Comment: rare     Drug use: No     Sexual activity: Not Currently   Lifestyle     Physical activity:     Days per week: None     Minutes per session: None     Stress: None   Relationships     Social connections:     Talks on phone: None     Gets together: None     Attends [employer] service: None     Active member of club or organization: None     Attends meetings of clubs or organizations: None     Relationship status: None     Intimate partner violence:     Fear of current or ex partner: None     Emotionally abused: None     Physically abused: None     Forced sexual activity: None   Other Topics Concern     Parent/sibling w/ CABG, MI or angioplasty before 65F 55M? Not Asked      Service Not Asked     Blood Transfusions Not Asked     Caffeine Concern No     Occupational Exposure No     Comment: none     Hobby Hazards Not Asked     Sleep Concern No     Stress Concern No     Weight Concern No     Special Diet Yes     Comment: low salt     Back Care Not Asked     Exercise Yes     Comment: walking     Bike Helmet Not Asked     Seat Belt Yes     Self-Exams Not Asked   Social History Narrative     None       Review of Systems:  Skin:  Negative       Eyes:  Positive for glasses;cataracts cataract in right eye  ENT:  Negative      Respiratory:  Negative       Cardiovascular:    edema;Positive for mostly controlled with torsemide  Gastroenterology: Negative      Genitourinary:  Positive for prostate problem enlarged prostate  Musculoskeletal:  Positive for joint pain knees  Neurologic:  Negative      Psychiatric:  Negative      Heme/Lymph/Imm:  Positive for allergies RX  Endocrine:  Positive for thyroid disorder hypothyroidism    Physical Exam:  Vitals: /73   Pulse 62   Ht 1.727 m (5' 8\")   Wt 67.6 kg (149 lb)   BMI 22.66 kg/m      Constitutional:  " cooperative, alert and oriented, well developed, well nourished, in no acute distress        Skin:  warm and dry to the touch, no apparent skin lesions or masses noted          Head:  normocephalic, no masses or lesions        Eyes:  pupils equal and round, conjunctivae and lids unremarkable, sclera white, no xanthalasma, EOMS intact, no nystagmus        Lymph:      ENT:  no pallor or cyanosis, dentition good        Neck:  carotid pulses are full and equal bilaterally, JVP normal, no carotid bruit        Respiratory:  normal breath sounds, clear to auscultation, normal A-P diameter, normal symmetry, normal respiratory excursion, no use of accessory muscles         Cardiac: regular rhythm, normal S1/S2, no S3 or S4, apical impulse not displaced, no murmurs, gallops or rubs occasional premature beats                                                       GI:  abdomen soft, non-tender, BS normoactive, no mass, no HSM, no bruits        Extremities and Muscular Skeletal:  no deformities, clubbing, cyanosis, erythema observed              Neurological:  no gross motor deficits        Psych:           CC  Stephanie Jaimes, PA-C  [id number]  [place] [address]

## 2019-04-03 ENCOUNTER — HOSPITAL ENCOUNTER (OUTPATIENT)
Dept: CARDIOLOGY | Facility: CLINIC | Age: 74
Discharge: HOME | End: 2019-04-03
Attending: INTERNAL MEDICINE | Admitting: INTERNAL MEDICINE
Payer: COMMERCIAL

## 2019-04-03 ENCOUNTER — HOSPITAL ENCOUNTER (OUTPATIENT)
Dept: CARDIOLOGY | Facility: CLINIC | Age: 74
End: 2019-04-03
Attending: INTERNAL MEDICINE
Payer: COMMERCIAL

## 2019-04-03 DIAGNOSIS — I42.0 DILATED CARDIOMYOPATHY (H): ICD-10-CM

## 2019-04-03 DIAGNOSIS — I48.3 TYPICAL ATRIAL FLUTTER (H): ICD-10-CM

## 2019-04-03 PROCEDURE — 93306 TTE W/DOPPLER COMPLETE: CPT | Mod: 26 | Performed by: INTERNAL MEDICINE

## 2019-04-03 PROCEDURE — 93306 TTE W/DOPPLER COMPLETE: CPT

## 2019-04-03 PROCEDURE — 0298T ZIO PATCH HOLTER ADULT PEDIATRIC GREATER THAN 48 HRS: CPT | Performed by: INTERNAL MEDICINE

## 2019-04-03 PROCEDURE — 0296T ZIO PATCH HOLTER ADULT PEDIATRIC GREATER THAN 48 HRS: CPT

## 2019-04-10 ENCOUNTER — DOCUMENTATION ONLY (OUTPATIENT)
Dept: CARDIOLOGY | Facility: CLINIC | Age: 74
End: 2019-04-10

## 2019-04-10 DIAGNOSIS — I50.20 SYSTOLIC CONGESTIVE HEART FAILURE, UNSPECIFIED HF CHRONICITY (H): ICD-10-CM

## 2019-04-10 DIAGNOSIS — I42.0 DILATED CARDIOMYOPATHY (H): ICD-10-CM

## 2019-04-10 DIAGNOSIS — I50.9 CHF (CONGESTIVE HEART FAILURE) (H): Primary | ICD-10-CM

## 2019-04-10 LAB
ANION GAP SERPL CALCULATED.3IONS-SCNC: 13.7 MMOL/L (ref 6–17)
BUN SERPL-MCNC: 41 MG/DL (ref 7–30)
CALCIUM SERPL-MCNC: 10 MG/DL (ref 8.5–10.5)
CHLORIDE SERPL-SCNC: 104 MMOL/L (ref 98–107)
CO2 SERPL-SCNC: 26 MMOL/L (ref 23–29)
CREAT SERPL-MCNC: 2.17 MG/DL (ref 0.7–1.3)
GFR SERPL CREATININE-BSD FRML MDRD: 30 ML/MIN/{1.73_M2}
GLUCOSE SERPL-MCNC: 101 MG/DL (ref 70–105)
POTASSIUM SERPL-SCNC: 4.7 MMOL/L (ref 3.5–5.1)
SODIUM SERPL-SCNC: 139 MMOL/L (ref 136–145)

## 2019-04-10 PROCEDURE — 80048 BASIC METABOLIC PNL TOTAL CA: CPT | Performed by: INTERNAL MEDICINE

## 2019-04-10 PROCEDURE — 36415 COLL VENOUS BLD VENIPUNCTURE: CPT | Performed by: INTERNAL MEDICINE

## 2019-04-10 NOTE — PROGRESS NOTES
Ravi Fischer,    I've held a spot on [facility] PAC's CORE schedule in [place] for tomorrow at 0930. Could you please order the follow-up per Dr. Larkin and call pt to review recommendations/plan? I will place orders for a pro BNP and Hgb, which should be done before tomorrow's visit, as well. I'll CC: the [place] CORE nurses so they can watch for results.     Please let me know if you have questions. Thanks!    Carolina Costello RN BSN   11:56 AM 04/10/19

## 2019-04-10 NOTE — TELEPHONE ENCOUNTER
Ivan Larkin MD  [facility] Ep Nurse             Creat unchanged from a few weeks ago, a bit elevated but still within range of his baseline. Continue with current diurectics. LVEF unchanged as well at 40%. But known TR is getting worse. He's going to see Dr. Snow next few months who will be his primary cardiologist in place of Dr. Mancini and he will address this issue in more details. Thanks, p          Called patient with Dr Larkin's message - he is still taking 10 mg of torsemide and his current weight is back down to around 144 lbs. He states that the edema in his legs isn't much better though. At night he'll have swelling below his knees, particularly in his left ankle- all of that is gone by morning, but he notices that his thighs are swollen in the mornings.     He did increase torsemide to 15mg one day, but has been consistently taking 10 mg for the last 3-4 days.     He wants Dr Larkin's input on how to balance dosing with creatinine and remaining edma.

## 2019-04-11 ENCOUNTER — OFFICE VISIT (OUTPATIENT)
Dept: CARDIOLOGY | Facility: CLINIC | Age: 74
End: 2019-04-11
Payer: COMMERCIAL

## 2019-04-11 VITALS
HEART RATE: 58 BPM | DIASTOLIC BLOOD PRESSURE: 76 MMHG | SYSTOLIC BLOOD PRESSURE: 124 MMHG | OXYGEN SATURATION: 93 % | WEIGHT: 147.7 LBS | HEIGHT: 68 IN | BODY MASS INDEX: 22.39 KG/M2

## 2019-04-11 DIAGNOSIS — I50.20 SYSTOLIC CONGESTIVE HEART FAILURE, UNSPECIFIED HF CHRONICITY (H): Primary | ICD-10-CM

## 2019-04-11 DIAGNOSIS — I50.9 CHF (CONGESTIVE HEART FAILURE) (H): ICD-10-CM

## 2019-04-11 DIAGNOSIS — I36.1 NON-RHEUMATIC TRICUSPID VALVE INSUFFICIENCY: ICD-10-CM

## 2019-04-11 LAB
HGB BLD-MCNC: 16.6 G/DL (ref 13.3–17.7)
NT-PROBNP SERPL-MCNC: 5449 PG/ML (ref 0–450)

## 2019-04-11 PROCEDURE — 85018 HEMOGLOBIN: CPT | Performed by: PHYSICIAN ASSISTANT

## 2019-04-11 PROCEDURE — 36415 COLL VENOUS BLD VENIPUNCTURE: CPT | Performed by: INTERNAL MEDICINE

## 2019-04-11 PROCEDURE — 99215 OFFICE O/P EST HI 40 MIN: CPT | Performed by: PHYSICIAN ASSISTANT

## 2019-04-11 PROCEDURE — 83880 ASSAY OF NATRIURETIC PEPTIDE: CPT | Performed by: PHYSICIAN ASSISTANT

## 2019-04-11 RX ORDER — TORSEMIDE 5 MG/1
15 TABLET ORAL DAILY
Qty: 90 TABLET | Refills: 2
Start: 2019-04-11 | End: 2019-04-16

## 2019-04-11 RX ORDER — ISOSORBIDE MONONITRATE 30 MG/1
30 TABLET, EXTENDED RELEASE ORAL DAILY
Qty: 90 TABLET | Refills: 3 | Status: SHIPPED | OUTPATIENT
Start: 2019-04-11 | End: 2020-04-01

## 2019-04-11 ASSESSMENT — MIFFLIN-ST. JEOR: SCORE: 1379.46

## 2019-04-11 NOTE — PROGRESS NOTES
Cardiology Progress Note    Date of Service: 04/11/2019      Reason for visit: [facility] clinic enrollment, nonischemic cardiomyopathy.    Primary cardiologist: Dr. Ivan Larkin, Dr. Castro      HPI:  Mr. Fischer is a very pleasant 73 year old male with a PMHx including CKD, dyslipidemia, coronary artery disease, and atrial fibrillation/flutter. He has a somewhat complex cardiac history. He has known RV dysfunction and tricuspid regurgitation with RV annular dilatation, for he was previously evaluated at [facility]. In addition, he has a cardiomyopathy that was initially felt secondary to his arrhythmias, as his EF at one point had near normalized with control of his rhythm. In 2017 he had a right and left heart catheterization performed showing trivial nonobstructive coronary disease, and PA pressures were normal with a mean of 19mmHg. His PCWP was elevated at 20mmHg, and his CI via Kirill (due to severe TR) was severely reduced at 1.9LPM. There was also question of a small right to left shunt and at least moderate mitral insufficiency.    He has had issues with recurrent afib now on amiodarone (complicated by thyroid dysfunction, which is now controlled), and is followed by Dr. Larkin of . 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 It does not appear that he has had an afib ablation.  Optimization of his cardiomyopathy regimen has been challenging, as he is intolerant of beta blockers due to severe fatigue, and is unable to tolerate an ACE-I due to his CKD. Fortunately, over the last few years he had done quite well with the need for minimal diuretics. However, he saw Dr. Larkin a few weeks ago and noted increased pedal edema. He requested a Ziopatch to assess his afib burden and repeat echo. His echo last week showed that his EF remained 35-40%, with continued moderately decreased RV function. His TR was reported now as 4+, which was felt to be  worse than previously. He also has ongoing moderate 2+ MR. Mr Fischer was then referred to the [facility] clinic for ongoing management.    Today he tells me he has been periodically adjusting his torsemide between 5mg and 15mg daily based on his swelling. Lately, however, he has had a bit more difficulty with this. His CKD has complicated this, and as his SCr had gone up slightly he has been trying to remain 5mg for the most part. This has resulted in intermittent pitting edema to his thighs. Interestingly, he tells me he doesn't require his diuretics at all when [place] is traveling [place].  He remains very active despite this, and denies GANT, chest pain, palpitations, or dizziness/presyncope. His main complaint is the swelling. He is on his feet most of the day as his wife has some physical disabilities; he does almost all the house work and cooking on his own without issue. He tries his best to remain low sodium, and rarely eats out. Of note, he is a [employer] and travels quite frequently to [place]. In fact, he is going again in a few weeks.     He is currently wearing his Ziopatch so this data is not yet available. His echo findings were discussed with him as above. It has already been arranged for him to see a [facility] MD to help sort out the valvular disease and whether this will require intervention. Labs done prior to his visit today continue to show renal dysfunction; his SCr appears to fluctuate anywhere from 1.6 to what appears to be 3.5 last year. His NT-proBNP is up significantly, to 5449. His last TSH a few weeks ago was normal (on replacement), and he is not anemic. BP today is good at 124/76, and HR is the high 50s.       ASSESSMENT/PLAN:    1. Nonischemic cardiomyopathy.   --Most recent echo last week showed EF 35-40%, with moderately decreased RV function. His TR is now 4+, which was felt to be worse than previous. He also has 2+MR. Angiogram in 2017 has shown only trival coronary disease.    --He has evidence  of volume overload on exam today with pitting edema to his knees, along with an elevated NT-proBNP. I will try to push his diuretics; he will stay on the higher dose of torsemide 15mg over the weekend and call us with weight/swelling update on Monday. He will weigh daily at home, and continue to work on salt restriction. I will see him back in 1 week with repeat labs.    --He is intolerant of beta blockers due to severe fatigue, and failed ACE-I in the past due to renal dysfunction. Will try to slowly optimize his regimen; start Imdur 30mg today, and if does well can trial addition of hydralazine next week. For now it appears we have plenty of BP room. Given his RV/valvular dysfunction, watch closely for adverse effects.    --I have requested we move up his appt with JACOB MD; as he will be out of the country for several weeks again in the near future, I have arranged for his in Mid May. Will need further discussion about his valvular dysfunction.    --It appears that his last nephrology visit was in April of 2017. We did not formally discuss this today, but may need to revisit at follow up regarding continued evaluation.       2. Atrial fibrillation/flutter.   --Following with EP; he is on amiodarone (complicated by thyroid dysfunction, which is now controlled). 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 Will await results of Ziopatch and whether afib ablation will be considered.    --Continue Eliquis, which he is tolerating well.     Follow up plan:  Return to see myself in [facility] in 1 week with labs. Then to establish with new [facility] MD next month.       Orders this Visit:  Orders Placed This Encounter   Procedures     Basic metabolic panel     N terminal pro BNP outpatient     Follow-Up with [facility] - MAGED visit     Orders Placed This Encounter   Medications     torsemide (DEMADEX) 5 MG tablet     Sig: Take 3 tablets (15 mg) by mouth  daily As directed by the [facility] clinic     Dispense:  90 tablet     Refill:  2     isosorbide mononitrate (IMDUR) 30 MG 24 hr tablet     Sig: Take 1 tablet (30 mg) by mouth daily     Dispense:  90 tablet     Refill:  3     Medications Discontinued During This Encounter   Medication Reason     torsemide (DEMADEX) 5 MG tablet            CURRENT MEDICATIONS:  Current Outpatient Medications   Medication Sig Dispense Refill     Acetaminophen (TYLENOL PO) Take 500 mg by mouth every morning       amiodarone (PACERONE/CODARONE) 200 MG tablet Take 1 tablet (200 mg) by mouth daily 90 tablet 3     apixaban ANTICOAGULANT (ELIQUIS) 5 MG tablet Take 1 tablet (5 mg) by mouth 2 times daily 180 tablet 3     isosorbide mononitrate (IMDUR) 30 MG 24 hr tablet Take 1 tablet (30 mg) by mouth daily 90 tablet 3     levothyroxine (SYNTHROID/LEVOTHROID) 100 MCG tablet Take 88 mcg by mouth 88mcg on all days; 44mcg on Fridays       torsemide (DEMADEX) 5 MG tablet Take 3 tablets (15 mg) by mouth daily As directed by the [facility] clinic 90 tablet 2     VITAMIN D, CHOLECALCIFEROL, PO Take 1,000 Units by mouth daily          ALLERGIES     Allergies   Allergen Reactions     Ace Inhibitors      Due to CKD, renal insufficiency     Pravastatin      Muscle aches       PAST MEDICAL HISTORY:  Past Medical History:   Diagnosis Date     Atrial flutter (H) 11/23/15     AVNRT (AV jade re-entry tachycardia) (H)     status post     BPH (benign prostatic hyperplasia)      CAD (coronary artery disease)     Abnormal Nuc 2014     Cardiomyopathy (H)     Echo 11/2015- EF 35-40%     CHF (congestive heart failure) (H) 11/25/2015    Echo 11/2015- EF 35-40%      CKD (chronic kidney disease)     not on ACE/ARB due to insufficiency     Elevated PSA      Encounter for cardioversion procedure 09/25/2018    1 shock with 120 joules successful      Hypercholesterolemia 7/10/2014     Malaria      Mild pulmonic regurgitation and RV dysfunction by prior echocardiogram       Non-rheumatic tricuspid valve insufficiency      Paroxysmal A-fib (H)      Persistent atrial fibrillation with rapid ventricular response (H)      Pigment deposition      Systolic heart failure (H)     Echo 11/2015- EF 35-40%     Tricuspid regurgitation     Mod 2016       PAST SURGICAL HISTORY:  Past Surgical History:   Procedure Laterality Date     ANESTHESIA CARDIOVERSION N/A 12/6/2016    Procedure: ANESTHESIA CARDIOVERSION;  Surgeon: GENERIC ANESTHESIA PROVIDER;  Location: [facility] OR     ANESTHESIA CARDIOVERSION N/A 6/4/2018    Procedure: ANESTHESIA CARDIOVERSION;  ANESTHESIA CARDIOVERSION ;  Surgeon: GENERIC ANESTHESIA PROVIDER;  Location: [facility] OR     ANESTHESIA CARDIOVERSION N/A 9/25/2018    Procedure: ANESTHESIA CARDIOVERSION;  ANESTHESIA CARDIOVERSION ;  Surgeon: GENERIC ANESTHESIA PROVIDER;  Location: [facility] OR     C KP (TRANSESOPHAGEAL ECHO)  12/6/16     CARDIOVERSION  12/14/15     H ABLATION ATRIAL FLUTTER  3/21/16     ORTHOPEDIC SURGERY Left 1957    foot injury repair       FAMILY HISTORY:  Family History   Problem Relation Age of Onset     C.A.D. Mother      Prostate Cancer Father 73       SOCIAL HISTORY:  Social History     Socioeconomic History     Marital status:      Spouse name: None     Number of children: None     Years of education: None     Highest education level: None   Occupational History     None   Social Needs     Financial resource strain: None     Food insecurity:     Worry: None     Inability: None     Transportation needs:     Medical: None     Non-medical: None   Tobacco Use     Smoking status: Never Smoker     Smokeless tobacco: Never Used   Substance and Sexual Activity     Alcohol use: Yes     Comment: rare     Drug use: No     Sexual activity: Not Currently   Lifestyle     Physical activity:     Days per week: None     Minutes per session: None     Stress: None   Relationships     Social connections:     Talks on phone: None     Gets together: None     Attends [employer] service: None  "    Active member of club or organization: None     Attends meetings of clubs or organizations: None     Relationship status: None     Intimate partner violence:     Fear of current or ex partner: None     Emotionally abused: None     Physically abused: None     Forced sexual activity: None   Other Topics Concern     Parent/sibling w/ CABG, MI or angioplasty before 65F 55M? Not Asked      Service Not Asked     Blood Transfusions Not Asked     Caffeine Concern No     Occupational Exposure No     Comment: none     Hobby Hazards Not Asked     Sleep Concern No     Stress Concern No     Weight Concern No     Special Diet Yes     Comment: low salt     Back Care Not Asked     Exercise Yes     Comment: walking     Bike Helmet Not Asked     Seat Belt Yes     Self-Exams Not Asked   Social History Narrative     None       Review of Systems:  Cardiovascular: negative for chest pain, palpitations, pos LE edema  Constitutional: negative for chills, sweats, fevers. Pos occasional fatigue.  Resp: Negative for dyspnea at rest, dyspnea on exertion, cough, known chronic lung disease  HEENT: Negative for new visual changes, frequent headaches  Gastrointestinal: negative for abdominal pain, diarrhea, nausea, vomiting  Hematologic/lymphatic: pos for current systemic anticoagulation, neg hx of blood clots  Musculoskeletal: negative for new back pain, joint pain  Neurological: negative for focal weakness, LOC, seizures, syncope/presyncope.       Physical Exam:  Vitals: /76 (BP Location: Right arm, Patient Position: Chair, Cuff Size: Adult Small)   Pulse 58   Ht 1.727 m (5' 8\")   Wt 67 kg (147 lb 11.2 oz)   SpO2 93%   BMI 22.46 kg/m     Wt Readings from Last 4 Encounters:   04/11/19 67 kg (147 lb 11.2 oz)   03/27/19 67.6 kg (149 lb)   12/21/18 64.8 kg (142 lb 12.8 oz)   10/16/18 64.9 kg (143 lb)       GEN:  In general, this is a well nourished  male in no acute distress on room air.  Patient ambulatory, " unaccompanied today.  HEENT:  Pupils equal, round. Sclerae nonicteric.   NECK: Supple, no masses appreciated. Trachea midline, mild JVD noted while upright.   C/V:  Occasional irregular rhythm on exam, but mostly regular. 1/6 MILDRED, heart at LSB. No rub or gallop. No S3 or RV heave.   RESP: Respirations are unlabored. No use of accessory muscles. Clear to auscultation bilaterally without wheezing, rales, or rhonchi.  GI: Abdomen soft, nontender, nondistended. No HSM appreciated.   EXTREM: 2+ bilateral LE edema, L slightly more than R, up to knees.  No cyanosis or clubbing.  NEURO: Alert and oriented, cooperative. Gait not assessed. No obvious focal deficits.   PSYCH: Normal affect.  SKIN: Warm and dry. No rashes or petechiae appreciated.       Recent Lab Results:    CBC RESULTS:  Lab Results   Component Value Date    WBC 5.1 09/20/2018    WBC 4.4 02/17/2018    RBC 5.20 09/20/2018    RBC 4.44 02/17/2018    HGB 16.6 04/11/2019    HCT 48.6 09/20/2018    MCV 93.5 09/20/2018    MCH 30.8 09/20/2018    MCHC 32.9 (A) 09/20/2018    RDW 14.6 02/17/2018     09/20/2018       BMP RESULTS:  Lab Results   Component Value Date     04/10/2019    POTASSIUM 4.7 04/10/2019    CHLORIDE 104 04/10/2019    CO2 26 04/10/2019    ANIONGAP 13.7 04/10/2019     04/10/2019    BUN 41 (H) 04/10/2019    CR 2.17 (H) 04/10/2019    GFRESTIMATED 30 (L) 04/10/2019    GFRESTBLACK 36 (L) 04/10/2019    TAYLOR 10.0 04/10/2019        Results for TREY BURGER (MRN [id number]) as of 4/11/2019 10:48   Ref. Range 11/25/2015 14:46 11/28/2015 07:05 4/11/2019 08:43   N-Terminal Pro BNP Inpatient Latest Ref Range: 0 - 900 pg/mL 5,221 (H) 3,875 (H)    N-Terminal Pro Bnp Latest Ref Range: 0 - 450 pg/mL   5,449 (H)         Additional pertinent testing:  Echo 4/3/19  Interpretation Summary     Left ventricular systolic function is moderately reduced.  The visual ejection fraction is estimated at 35-40%.  Moderately decreased right ventricular systolic  function  The right ventricle is moderately dilated.  There is severe (4+) tricuspid regurgitation.  Dilated IVC (>2.5cm) with no respiratory collapse; right atrial pressure is  estimated at >20mmHg.  There is moderate (2+) mitral regurgitation.  Compared to 2/18, degree of TR is worse. RV is enlarged further. Suggest  referral to valve clinic if clinically appropriate. The study was technically  difficult.    Greater than 40 minutes was spent with this patient, >50% of which was spent in counseling and coordination of care.         Diana Zarate PA-C  [facility]  Pager [phone]

## 2019-04-11 NOTE — LETTER
4/11/2019    Kevan Bull MD  [address]  [facility] [address]    RE: Misael Fischer       Dear Colleague,    I had the pleasure of seeing Misael Fischer in the [facility].      Cardiology Progress Note    Date of Service: 04/11/2019      Reason for visit: [facility] clinic enrollment, nonischemic cardiomyopathy.    Primary cardiologist: Dr. Ivan Larkin, Dr. Castro      HPI:  Mr. Fischer is a very pleasant 73 year old male with a PMHx including CKD, dyslipidemia, coronary artery disease, and atrial fibrillation/flutter. He has a somewhat complex cardiac history. He has known RV dysfunction and tricuspid regurgitation with RV annular dilatation, for he was previously evaluated at [facility]. In addition, he has a cardiomyopathy that was initially felt secondary to his arrhythmias, as his EF at one point had near normalized with control of his rhythm. In 2017 he had a right and left heart catheterization performed showing trivial nonobstructive coronary disease, and PA pressures were normal with a mean of 19mmHg. His PCWP was elevated at 20mmHg, and his CI via Kirill (due to severe TR) was severely reduced at 1.9LPM. There was also question of a small right to left shunt and at least moderate mitral insufficiency.    He has had issues with recurrent afib now on amiodarone (complicated by thyroid dysfunction, which is now controlled), and is followed by Dr. Larkin of . 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 It does not appear that he has had an afib ablation.  Optimization of his cardiomyopathy regimen has been challenging, as he is intolerant of beta blockers due to severe fatigue, and is unable to tolerate an ACE-I due to his CKD. Fortunately, over the last few years he had done quite well with the need for minimal diuretics. However, he saw Dr. Larkin a few weeks ago and noted increased pedal edema. He  requested a Ziopatch to assess his afib burden and repeat echo. His echo last week showed that his EF remained 35-40%, with continued moderately decreased RV function. His TR was reported now as 4+, which was felt to be worse than previously. He also has ongoing moderate 2+ MR. Mr Fischer was then referred to the [facility] clinic for ongoing management.    Today he tells me he has been periodically adjusting his torsemide between 5mg and 15mg daily based on his swelling. Lately, however, he has had a bit more difficulty with this. His CKD has complicated this, and as his SCr had gone up slightly he has been trying to remain 5mg for the most part. This has resulted in intermittent pitting edema to his thighs. Interestingly, he tells me he doesn't require his diuretics at all when [place] is traveling [place].  He remains very active despite this, and denies GANT, chest pain, palpitations, or dizziness/presyncope. His main complaint is the swelling. He is on his feet most of the day as his wife has some physical disabilities; he does almost all the house work and cooking on his own without issue. He tries his best to remain low sodium, and rarely eats out. Of note, he is a [employer] and travels quite frequently to [place]. In fact, he is going again in a few weeks.     He is currently wearing his Ziopatch so this data is not yet available. His echo findings were discussed with him as above. It has already been arranged for him to see a [facility] MD to help sort out the valvular disease and whether this will require intervention. Labs done prior to his visit today continue to show renal dysfunction; his SCr appears to fluctuate anywhere from 1.6 to what appears to be 3.5 last year. His NT-proBNP is up significantly, to 5449. His last TSH a few weeks ago was normal (on replacement), and he is not anemic. BP today is good at 124/76, and HR is the high 50s.       ASSESSMENT/PLAN:    1. Nonischemic cardiomyopathy.   --Most recent echo last  week showed EF 35-40%, with moderately decreased RV function. His TR is now 4+, which was felt to be worse than previous. He also has 2+MR. Angiogram in 2017 has shown only trival coronary disease.    --He has evidence of volume overload on exam today with pitting edema to his knees, along with an elevated NT-proBNP. I will try to push his diuretics; he will stay on the higher dose of torsemide 15mg over the weekend and call us with weight/swelling update on Monday. He will weigh daily at home, and continue to work on salt restriction. I will see him back in 1 week with repeat labs.    --He is intolerant of beta blockers due to severe fatigue, and failed ACE-I in the past due to renal dysfunction. Will try to slowly optimize his regimen; start Imdur 30mg today, and if does well can trial addition of hydralazine next week. For now it appears we have plenty of BP room. Given his RV/valvular dysfunction, watch closely for adverse effects.    --I have requested we move up his appt with JACOB MD; as he will be out of the country for several weeks again in the near future, I have arranged for his in Mid May. Will need further discussion about his valvular dysfunction.    --It appears that his last nephrology visit was in April of 2017. We did not formally discuss this today, but may need to revisit at follow up regarding continued evaluation.       2. Atrial fibrillation/flutter.   --Following with EP; he is on amiodarone (complicated by thyroid dysfunction, which is now controlled). 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 Will await results of Ziopatch and whether afib ablation will be considered.    --Continue Eliquis, which he is tolerating well.     Follow up plan:  Return to see myself in [facility] in 1 week with labs. Then to establish with new C[facility] MD next month.       Orders this Visit:  Orders Placed This Encounter   Procedures      Basic metabolic panel     N terminal pro BNP outpatient     Follow-Up with [facility] Clinic - MAGED visit     Orders Placed This Encounter   Medications     torsemide (DEMADEX) 5 MG tablet     Sig: Take 3 tablets (15 mg) by mouth daily As directed by the [facility] clinic     Dispense:  90 tablet     Refill:  2     isosorbide mononitrate (IMDUR) 30 MG 24 hr tablet     Sig: Take 1 tablet (30 mg) by mouth daily     Dispense:  90 tablet     Refill:  3     Medications Discontinued During This Encounter   Medication Reason     torsemide (DEMADEX) 5 MG tablet            CURRENT MEDICATIONS:  Current Outpatient Medications   Medication Sig Dispense Refill     Acetaminophen (TYLENOL PO) Take 500 mg by mouth every morning       amiodarone (PACERONE/CODARONE) 200 MG tablet Take 1 tablet (200 mg) by mouth daily 90 tablet 3     apixaban ANTICOAGULANT (ELIQUIS) 5 MG tablet Take 1 tablet (5 mg) by mouth 2 times daily 180 tablet 3     isosorbide mononitrate (IMDUR) 30 MG 24 hr tablet Take 1 tablet (30 mg) by mouth daily 90 tablet 3     levothyroxine (SYNTHROID/LEVOTHROID) 100 MCG tablet Take 88 mcg by mouth 88mcg on all days; 44mcg on Fridays       torsemide (DEMADEX) 5 MG tablet Take 3 tablets (15 mg) by mouth daily As directed by the [facility] clinic 90 tablet 2     VITAMIN D, CHOLECALCIFEROL, PO Take 1,000 Units by mouth daily          ALLERGIES     Allergies   Allergen Reactions     Ace Inhibitors      Due to CKD, renal insufficiency     Pravastatin      Muscle aches       PAST MEDICAL HISTORY:  Past Medical History:   Diagnosis Date     Atrial flutter (H) 11/23/15     AVNRT (AV jade re-entry tachycardia) (H)     status post     BPH (benign prostatic hyperplasia)      CAD (coronary artery disease)     Abnormal Nuc 2014     Cardiomyopathy (H)     Echo 11/2015- EF 35-40%     CHF (congestive heart failure) (H) 11/25/2015    Echo 11/2015- EF 35-40%      CKD (chronic kidney disease)     not on ACE/ARB due to insufficiency     Elevated PSA       Encounter for cardioversion procedure 09/25/2018    1 shock with 120 joules successful      Hypercholesterolemia 7/10/2014     Malaria      Mild pulmonic regurgitation and RV dysfunction by prior echocardiogram      Non-rheumatic tricuspid valve insufficiency      Paroxysmal A-fib (H)      Persistent atrial fibrillation with rapid ventricular response (H)      Pigment deposition      Systolic heart failure (H)     Echo 11/2015- EF 35-40%     Tricuspid regurgitation     Mod 2016       PAST SURGICAL HISTORY:  Past Surgical History:   Procedure Laterality Date     ANESTHESIA CARDIOVERSION N/A 12/6/2016    Procedure: ANESTHESIA CARDIOVERSION;  Surgeon: GENERIC ANESTHESIA PROVIDER;  Location:  OR     ANESTHESIA CARDIOVERSION N/A 6/4/2018    Procedure: ANESTHESIA CARDIOVERSION;  ANESTHESIA CARDIOVERSION ;  Surgeon: GENERIC ANESTHESIA PROVIDER;  Location: [facility] OR     ANESTHESIA CARDIOVERSION N/A 9/25/2018    Procedure: ANESTHESIA CARDIOVERSION;  ANESTHESIA CARDIOVERSION ;  Surgeon: GENERIC ANESTHESIA PROVIDER;  Location:  OR     C KP (TRANSESOPHAGEAL ECHO)  12/6/16     CARDIOVERSION  12/14/15     H ABLATION ATRIAL FLUTTER  3/21/16     ORTHOPEDIC SURGERY Left 1957    foot injury repair       FAMILY HISTORY:  Family History   Problem Relation Age of Onset     C.A.D. Mother      Prostate Cancer Father 73       SOCIAL HISTORY:  Social History     Socioeconomic History     Marital status:      Spouse name: None     Number of children: None     Years of education: None     Highest education level: None   Occupational History     None   Social Needs     Financial resource strain: None     Food insecurity:     Worry: None     Inability: None     Transportation needs:     Medical: None     Non-medical: None   Tobacco Use     Smoking status: Never Smoker     Smokeless tobacco: Never Used   Substance and Sexual Activity     Alcohol use: Yes     Comment: rare     Drug use: No     Sexual activity: Not Currently   Lifestyle  "    Physical activity:     Days per week: None     Minutes per session: None     Stress: None   Relationships     Social connections:     Talks on phone: None     Gets together: None     Attends [employer] service: None     Active member of club or organization: None     Attends meetings of clubs or organizations: None     Relationship status: None     Intimate partner violence:     Fear of current or ex partner: None     Emotionally abused: None     Physically abused: None     Forced sexual activity: None   Other Topics Concern     Parent/sibling w/ CABG, MI or angioplasty before 65F 55M? Not Asked      Service Not Asked     Blood Transfusions Not Asked     Caffeine Concern No     Occupational Exposure No     Comment: none     Hobby Hazards Not Asked     Sleep Concern No     Stress Concern No     Weight Concern No     Special Diet Yes     Comment: low salt     Back Care Not Asked     Exercise Yes     Comment: walking     Bike Helmet Not Asked     Seat Belt Yes     Self-Exams Not Asked   Social History Narrative     None       Review of Systems:  Cardiovascular: negative for chest pain, palpitations, pos LE edema  Constitutional: negative for chills, sweats, fevers. Pos occasional fatigue.  Resp: Negative for dyspnea at rest, dyspnea on exertion, cough, known chronic lung disease  HEENT: Negative for new visual changes, frequent headaches  Gastrointestinal: negative for abdominal pain, diarrhea, nausea, vomiting  Hematologic/lymphatic: pos for current systemic anticoagulation, neg hx of blood clots  Musculoskeletal: negative for new back pain, joint pain  Neurological: negative for focal weakness, LOC, seizures, syncope/presyncope.       Physical Exam:  Vitals: /76 (BP Location: Right arm, Patient Position: Chair, Cuff Size: Adult Small)   Pulse 58   Ht 1.727 m (5' 8\")   Wt 67 kg (147 lb 11.2 oz)   SpO2 93%   BMI 22.46 kg/m      Wt Readings from Last 4 Encounters:   04/11/19 67 kg (147 lb 11.2 oz) "   03/27/19 67.6 kg (149 lb)   12/21/18 64.8 kg (142 lb 12.8 oz)   10/16/18 64.9 kg (143 lb)       GEN:  In general, this is a well nourished  male in no acute distress on room air.  Patient ambulatory, unaccompanied today.  HEENT:  Pupils equal, round. Sclerae nonicteric.   NECK: Supple, no masses appreciated. Trachea midline, mild JVD noted while upright.   C/V:  Occasional irregular rhythm on exam, but mostly regular. 1/6 MILDRED, heart at LSB. No rub or gallop. No S3 or RV heave.   RESP: Respirations are unlabored. No use of accessory muscles. Clear to auscultation bilaterally without wheezing, rales, or rhonchi.  GI: Abdomen soft, nontender, nondistended. No HSM appreciated.   EXTREM: 2+ bilateral LE edema, L slightly more than R, up to knees.  No cyanosis or clubbing.  NEURO: Alert and oriented, cooperative. Gait not assessed. No obvious focal deficits.   PSYCH: Normal affect.  SKIN: Warm and dry. No rashes or petechiae appreciated.       Recent Lab Results:    CBC RESULTS:  Lab Results   Component Value Date    WBC 5.1 09/20/2018    WBC 4.4 02/17/2018    RBC 5.20 09/20/2018    RBC 4.44 02/17/2018    HGB 16.6 04/11/2019    HCT 48.6 09/20/2018    MCV 93.5 09/20/2018    MCH 30.8 09/20/2018    MCHC 32.9 (A) 09/20/2018    RDW 14.6 02/17/2018     09/20/2018       BMP RESULTS:  Lab Results   Component Value Date     04/10/2019    POTASSIUM 4.7 04/10/2019    CHLORIDE 104 04/10/2019    CO2 26 04/10/2019    ANIONGAP 13.7 04/10/2019     04/10/2019    BUN 41 (H) 04/10/2019    CR 2.17 (H) 04/10/2019    GFRESTIMATED 30 (L) 04/10/2019    GFRESTBLACK 36 (L) 04/10/2019    TAYLOR 10.0 04/10/2019        Results for TREY BURGER (MRN [id number]) as of 4/11/2019 10:48   Ref. Range 11/25/2015 14:46 11/28/2015 07:05 4/11/2019 08:43   N-Terminal Pro BNP Inpatient Latest Ref Range: 0 - 900 pg/mL 5,221 (H) 3,875 (H)    N-Terminal Pro Bnp Latest Ref Range: 0 - 450 pg/mL   5,449 (H)         Additional pertinent  testing:  Echo 4/3/19  Interpretation Summary     Left ventricular systolic function is moderately reduced.  The visual ejection fraction is estimated at 35-40%.  Moderately decreased right ventricular systolic function  The right ventricle is moderately dilated.  There is severe (4+) tricuspid regurgitation.  Dilated IVC (>2.5cm) with no respiratory collapse; right atrial pressure is  estimated at >20mmHg.  There is moderate (2+) mitral regurgitation.  Compared to 2/18, degree of TR is worse. RV is enlarged further. Suggest  referral to valve clinic if clinically appropriate. The study was technically  difficult.    Greater than 40 minutes was spent with this patient, >50% of which was spent in counseling and coordination of care.         Diana Zarate PA-C  [facility]  Pager [phone]      Thank you for allowing me to participate in the care of your patient.    Sincerely,     JESSICA Berg     [facility]

## 2019-04-11 NOTE — PATIENT INSTRUCTIONS
Call C.O.R.E. nurse for any questions or concerns Mon-Fri 8am-4pm: [phone] For concerns after hours: [phone]    Medication changes: INCREASE your torsemide to 15mg daily over the weekend. Please call on Monday to our [facility] nurse for a update on your weight and swelling.   START isosorbide 30mg once daily. Prescription sent to your pharmacy.    If you note any new dizziness/lightheaded please call the office.     Repeat labs on Wednesday.  I will see you back on Friday.

## 2019-04-15 ENCOUNTER — CARE COORDINATION (OUTPATIENT)
Dept: CARDIOLOGY | Facility: CLINIC | Age: 74
End: 2019-04-15

## 2019-04-15 DIAGNOSIS — I36.1 NON-RHEUMATIC TRICUSPID VALVE INSUFFICIENCY: ICD-10-CM

## 2019-04-15 NOTE — PROGRESS NOTES
"Pt was seen by Diana on 4/11. Pt was to call [facility] with update:   \"medication changes: INCREASE your torsemide to 15mg daily over the weekend. Please call on Monday to our [facility] nurse for a update on your weight and swelling.   START isosorbide 30mg once daily. Prescription sent to your pharmacy.\"    Left message for pt. Next appt 4/19  Helen Peters RN 3:06 PM 04/15/19    Pt called back. Pt states his wt at 144-145 lbs. (unchagned from OV)  Pt reports legs and feet are swollen by end of day. In the morning he has no swelling and as day goes on becomes more swollen and abd is bloated. Pt states he is on his feet \"all day long. I'm so busy doing odds and ends.\" Asked pt if it is possible for him to take some breaks during the day to elevate his legs, \"Oh I have so much to do every day that would take up to much time.\" Pt has tried compressions stockings in the past, \"They seemed to make the swelling worse. The socks I wear are pretty tight and they do push they fluid up right below my knees.\" Pt states that he is up until 3 am most nights \"doing things\". Would not elaborate as to what he is doing.   Pt is taking torsemide 15 mg daily. He did take an extra 5 mg tablet after Rastafarian, \"That did not make me go to the bathroom more.\"  Pt states he has \"really been watching the salt. I do all the cooking as my wife is handicapped.\"  Pt is also taking isosorbide. Denies dizziness or other symptoms.   Will update Diana.  Helen Peters, RN 3:49 PM 04/15/19    "

## 2019-04-16 RX ORDER — TORSEMIDE 20 MG/1
20 TABLET ORAL DAILY
Qty: 30 TABLET | Refills: 1 | Status: SHIPPED | OUTPATIENT
Start: 2019-04-16 | End: 2019-04-19

## 2019-04-16 NOTE — PROGRESS NOTES
Diana Zarate, Helen Mcintyre RN   Caller: Unspecified (Yesterday,  2:59 PM)             Please have him increase his torsemide to 20mg daily until I see him back and see if that helps with his edema/weight.   Thanks   Diana      Spoke to pt and instructed him to increase torsemide to 20 mg daily per Diana. Pt repeated back correctly. Pt reports his wt is 145 lbs today.   Med list updated.   Next appt 4/17 with Diana.   Helen Peters RN 9:14 AM 04/16/19

## 2019-04-17 DIAGNOSIS — I50.20 SYSTOLIC CONGESTIVE HEART FAILURE, UNSPECIFIED HF CHRONICITY (H): ICD-10-CM

## 2019-04-17 LAB
ANION GAP SERPL CALCULATED.3IONS-SCNC: 6 MMOL/L (ref 3–14)
BUN SERPL-MCNC: 41 MG/DL (ref 7–30)
CALCIUM SERPL-MCNC: 9.1 MG/DL (ref 8.5–10.1)
CHLORIDE SERPL-SCNC: 102 MMOL/L (ref 94–109)
CO2 SERPL-SCNC: 28 MMOL/L (ref 20–32)
CREAT SERPL-MCNC: 2.18 MG/DL (ref 0.66–1.25)
GFR SERPL CREATININE-BSD FRML MDRD: 28 ML/MIN/{1.73_M2}
GLUCOSE SERPL-MCNC: 145 MG/DL (ref 70–99)
NT-PROBNP SERPL-MCNC: 3557 PG/ML (ref 0–450)
POTASSIUM SERPL-SCNC: 4.7 MMOL/L (ref 3.4–5.3)
SODIUM SERPL-SCNC: 136 MMOL/L (ref 133–144)

## 2019-04-17 PROCEDURE — 83880 ASSAY OF NATRIURETIC PEPTIDE: CPT | Performed by: PHYSICIAN ASSISTANT

## 2019-04-17 PROCEDURE — 36415 COLL VENOUS BLD VENIPUNCTURE: CPT | Performed by: INTERNAL MEDICINE

## 2019-04-17 PROCEDURE — 80048 BASIC METABOLIC PNL TOTAL CA: CPT | Performed by: PHYSICIAN ASSISTANT

## 2019-04-17 NOTE — PROGRESS NOTES
----- Message from Ayana Helton MD sent at 4/17/2019  4:12 PM CDT -----  Please call patient and inform that the ultrasound did reveal multiple fibroids. There are cysts in both ovaries which are simple. Patient to see OB/Gyn. Patient to make an appointment with Dr. Michaud. Please put in orders   Spoke to patient regarding lab results from 10/16.  Cholesterol and liver function test WNL.  BMP--Na remains low however improved.  Cr still high but improved.  Per JESSICA Easley--continue to hold off of torsemide unless he needs it for swelling/GANT as numbers look so much better.  Patient provided verbal understanding. CLIFFORD Ball

## 2019-04-19 ENCOUNTER — OFFICE VISIT (OUTPATIENT)
Dept: CARDIOLOGY | Facility: CLINIC | Age: 74
End: 2019-04-19
Payer: COMMERCIAL

## 2019-04-19 VITALS
BODY MASS INDEX: 22.5 KG/M2 | RESPIRATION RATE: 19 BRPM | WEIGHT: 148 LBS | DIASTOLIC BLOOD PRESSURE: 54 MMHG | SYSTOLIC BLOOD PRESSURE: 108 MMHG | HEART RATE: 62 BPM | OXYGEN SATURATION: 95 %

## 2019-04-19 DIAGNOSIS — I36.1 NON-RHEUMATIC TRICUSPID VALVE INSUFFICIENCY: ICD-10-CM

## 2019-04-19 DIAGNOSIS — I50.20 SYSTOLIC CONGESTIVE HEART FAILURE, UNSPECIFIED HF CHRONICITY (H): ICD-10-CM

## 2019-04-19 PROCEDURE — 99214 OFFICE O/P EST MOD 30 MIN: CPT | Performed by: PHYSICIAN ASSISTANT

## 2019-04-19 RX ORDER — TORSEMIDE 20 MG/1
20 TABLET ORAL DAILY
Qty: 90 TABLET | Refills: 3 | Status: SHIPPED | OUTPATIENT
Start: 2019-04-19 | End: 2019-06-27

## 2019-04-19 ASSESSMENT — PAIN SCALES - GENERAL: PAINLEVEL: NO PAIN (0)

## 2019-04-19 NOTE — PROGRESS NOTES
Cardiology Progress Note    Date of Service: 04/19/2019      Reason for visit: [facility] clinic follow up, nonischemic cardiomyopathy.    Primary cardiologist: Dr. Ivan Larkin, Dr. 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 He has a somewhat complex cardiac history. He has known RV dysfunction and tricuspid regurgitation with RV annular dilatation, for which he was previously evaluated at [facility]. In addition, he has a cardiomyopathy that was initially felt secondary to his arrhythmias, as his EF at one point had near normalized with control of his rhythm. In 2017 he had a right and left heart catheterization performed showing trivial nonobstructive coronary disease, and PA pressures were normal with a mean of 19mmHg. His PCWP was elevated at 20mmHg, and his CI via Kirill (due to severe TR) was severely reduced at 1.9LPM. There was also question of a small right to left shunt and at least moderate mitral insufficiency.    He has had issues with recurrent afib now on amiodarone (complicated by thyroid dysfunction, which is now controlled), and is followed by Dr. Larkin of . 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 It does not appear that he has had an afib ablation.  Optimization of his cardiomyopathy regimen has been challenging, as he is intolerant of beta blockers due to severe fatigue, and is unable to tolerate an ACE-I due to his CKD. Fortunately, over the last few years he had done quite well with the need for minimal diuretics. However, when he saw Dr. Larkin the end of March, he noted increased pedal edema. A ziopatch was requested to assess his afib burden, with with a repeat echo. Echo showed that his EF remained 35-40%, with continued moderately decreased RV function. His TR was reported now as 4+,  which was felt to be worse than previously. He also has ongoing moderate 2+ MR. Mr Fischer was then referred to the [facility] for continued management.    At our first visit he told me he had been periodically adjusting his torsemide between 5mg and 15mg daily based on his swelling, but had required higher dosing recently. Due to his CKD he was trying to maintain on lower doses when possible.  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  He remains very active despite these issues and has not had GANT, chest pain, palpitations, or dizziness. His main complaint is the swelling. He tries his best to remain low sodium, and rarely eats out. At our last visit I asked him to remain on 15mg daily of torsemide daily and start weighing daily as well. In addition, I added Imdur for some afterload reduction. He's back today for a 2 week follow up.    Since last visit, he notes minimal change. A few days ago, via phone, I requested an update and given no change, I increased his torsemide to 20mg daily. Yesterday, his weight went down 1 lb, but again swelling is unchanged. His renal renal function also appears essentially unchanged; though notably, his NT-proBNP has trended down from 5449 to 3557, so hopefully we are starting to make some progress. His BP is down somewhat with these changes, in at 108/54 today, but he continues to deny any dizziness/presyncope.       ASSESSMENT/PLAN:    1. Nonischemic cardiomyopathy.   --Echo March 2019 with EF 35-40%, and moderately decreased RV function. His TR is now 4+, which was felt to be worse than previous. He also has 2+MR. Angiogram in 2017 showed only trival coronary disease; RHC showed no significant pulmonary hypertension with elevated filling pressures.    --His swelling is essentially unchanged. Will keep his torsemide at 20mg daily for now; hopefully the trend down in the NT-proBNP is an indicator we are  starting to make some progress. 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 This will complicate mgmt going forward, but he will call with any concerns.    --He is intolerant of beta blockers due to severe fatigue, and failed ACE-I in the past due to renal dysfunction. He appears to be tolerating Imdur thus far. I am hesitant to add hydralazine given softer BP and planned trip overseas. Can address this again at his follow up visit.     --He will follow up with a [facility] MD after he returns from [place] in May, for continued evaluation of his valvular dysfunction and whether this needs to be addressed further.        2. Atrial fibrillation/flutter.   --Following with EP; he is on amiodarone (complicated by thyroid dysfunction, which is now controlled). 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 Will await results of Ziopatch and whether afib ablation will be considered.    --Continue Eliquis, which he is tolerating well.       Follow up plan:  Return to see [facility] MD next month, with repeat labs.        Orders this Visit:  No orders of the defined types were placed in this encounter.    Orders Placed This Encounter   Medications     torsemide (DEMADEX) 20 MG tablet     Sig: Take 1 tablet (20 mg) by mouth daily As directed by the [facility] clinic     Dispense:  90 tablet     Refill:  3     Please do not fill until patient calls     Medications Discontinued During This Encounter   Medication Reason     torsemide (DEMADEX) 20 MG tablet Reorder           CURRENT MEDICATIONS:  Current Outpatient Medications   Medication Sig Dispense Refill     Acetaminophen (TYLENOL PO) Take 500 mg by mouth every morning       amiodarone (PACERONE/CODARONE) 200 MG tablet Take 1 tablet (200 mg) by mouth daily 90 tablet 3      apixaban ANTICOAGULANT (ELIQUIS) 5 MG tablet Take 1 tablet (5 mg) by mouth 2 times daily 180 tablet 3     isosorbide mononitrate (IMDUR) 30 MG 24 hr tablet Take 1 tablet (30 mg) by mouth daily 90 tablet 3     levothyroxine (SYNTHROID/LEVOTHROID) 100 MCG tablet Take 88 mcg by mouth 88mcg on all days; 44mcg on Fridays       torsemide (DEMADEX) 20 MG tablet Take 1 tablet (20 mg) by mouth daily As directed by the [facility] clinic 90 tablet 3     VITAMIN D, CHOLECALCIFEROL, PO Take 1,000 Units by mouth daily          ALLERGIES     Allergies   Allergen Reactions     Ace Inhibitors      Due to CKD, renal insufficiency     Pravastatin      Muscle aches       PAST MEDICAL HISTORY:  Past Medical History:   Diagnosis Date     Atrial flutter (H) 11/23/15     AVNRT (AV jade re-entry tachycardia) (H)     status post     BPH (benign prostatic hyperplasia)      CAD (coronary artery disease)     Abnormal Nuc 2014     Cardiomyopathy (H)     Echo 11/2015- EF 35-40%     CHF (congestive heart failure) (H) 11/25/2015    Echo 11/2015- EF 35-40%      CKD (chronic kidney disease)     not on ACE/ARB due to insufficiency     Elevated PSA      Encounter for cardioversion procedure 09/25/2018    1 shock with 120 joules successful      Hypercholesterolemia 7/10/2014     Malaria      Mild pulmonic regurgitation and RV dysfunction by prior echocardiogram      Non-rheumatic tricuspid valve insufficiency      Paroxysmal A-fib (H)      Persistent atrial fibrillation with rapid ventricular response (H)      Pigment deposition      Systolic heart failure (H)     Echo 11/2015- EF 35-40%     Tricuspid regurgitation     Mod 2016       PAST SURGICAL HISTORY:  Past Surgical History:   Procedure Laterality Date     ANESTHESIA CARDIOVERSION N/A 12/6/2016    Procedure: ANESTHESIA CARDIOVERSION;  Surgeon: GENERIC ANESTHESIA PROVIDER;  Location:  OR     ANESTHESIA CARDIOVERSION N/A 6/4/2018    Procedure: ANESTHESIA CARDIOVERSION;  ANESTHESIA CARDIOVERSION ;   Surgeon: GENERIC ANESTHESIA PROVIDER;  Location: [facility] OR     ANESTHESIA CARDIOVERSION N/A 9/25/2018    Procedure: ANESTHESIA CARDIOVERSION;  ANESTHESIA CARDIOVERSION ;  Surgeon: GENERIC ANESTHESIA PROVIDER;  Location: [facility] OR     C KP (TRANSESOPHAGEAL ECHO)  12/6/16     CARDIOVERSION  12/14/15     H ABLATION ATRIAL FLUTTER  3/21/16     ORTHOPEDIC SURGERY Left 1957    foot injury repair       FAMILY HISTORY:  Family History   Problem Relation Age of Onset     C.A.D. Mother      Prostate Cancer Father 73       SOCIAL HISTORY:  Social History     Socioeconomic History     Marital status:      Spouse name: Not on file     Number of children: Not on file     Years of education: Not on file     Highest education level: Not on file   Occupational History     Not on file   Social Needs     Financial resource strain: Not on file     Food insecurity:     Worry: Not on file     Inability: Not on file     Transportation needs:     Medical: Not on file     Non-medical: Not on file   Tobacco Use     Smoking status: Never Smoker     Smokeless tobacco: Never Used   Substance and Sexual Activity     Alcohol use: Yes     Comment: rare     Drug use: No     Sexual activity: Not Currently   Lifestyle     Physical activity:     Days per week: Not on file     Minutes per session: Not on file     Stress: Not on file   Relationships     Social connections:     Talks on phone: Not on file     Gets together: Not on file     Attends Islam service: Not on file     Active member of club or organization: Not on file     Attends meetings of clubs or organizations: Not on file     Relationship status: Not on file     Intimate partner violence:     Fear of current or ex partner: Not on file     Emotionally abused: Not on file     Physically abused: Not on file     Forced sexual activity: Not on file   Other Topics Concern     Parent/sibling w/ CABG, MI or angioplasty before 65F 55M? Not Asked      Service Not Asked     Blood  Transfusions Not Asked     Caffeine Concern No     Occupational Exposure No     Comment: none     Hobby Hazards Not Asked     Sleep Concern No     Stress Concern No     Weight Concern No     Special Diet Yes     Comment: low salt     Back Care Not Asked     Exercise Yes     Comment: walking     Bike Helmet Not Asked     Seat Belt Yes     Self-Exams Not Asked   Social History Narrative     Not on file       Review of Systems:  Cardiovascular: negative for chest pain, palpitations, pos LE edema  Constitutional: negative for chills, sweats, fevers. Pos occasional fatigue.  Resp: Negative for dyspnea at rest, dyspnea on exertion, cough, known chronic lung disease  HEENT: Negative for new visual changes, frequent headaches  Gastrointestinal: negative for abdominal pain, diarrhea, nausea, vomiting  Hematologic/lymphatic: pos for current systemic anticoagulation, neg hx of blood clots  Musculoskeletal: negative for new back pain, joint pain  Neurological: negative for focal weakness, LOC, seizures, syncope/presyncope.       Physical Exam:  Vitals: /54 (BP Location: Right arm, Patient Position: Sitting, Cuff Size: Adult Regular)   Pulse 62   Resp 19   Wt 67.1 kg (148 lb)   SpO2 95%   BMI 22.50 kg/m     Wt Readings from Last 4 Encounters:   04/19/19 67.1 kg (148 lb)   04/11/19 67 kg (147 lb 11.2 oz)   03/27/19 67.6 kg (149 lb)   12/21/18 64.8 kg (142 lb 12.8 oz)       GEN:  In general, this is a well nourished  male in no acute distress on room air.  Patient ambulatory, unaccompanied today.  HEENT:  Pupils equal, round. Sclerae nonicteric.   NECK: Supple, no masses appreciated. Trachea midline, +JVD to jaw.    C/V:  Appeared regular on exam today. 1/6 MILDRED, heard best at LSB. No rub or gallop. No S3 or RV heave.   RESP: Respirations are unlabored. No use of accessory muscles. Clear to auscultation bilaterally without wheezing, rales, or rhonchi.  GI: Abdomen soft, nontender, nondistended. No HSM  appreciated.   EXTREM: 1-2+ bilateral pitting edema to knees.  No cyanosis or clubbing.  NEURO: Alert and oriented, cooperative. Gait not assessed. No obvious focal deficits.   PSYCH: Normal affect.  SKIN: Warm and dry. No rashes or petechiae appreciated.       Recent Lab Results:    BMP RESULTS:  Lab Results   Component Value Date     04/17/2019    POTASSIUM 4.7 04/17/2019    CHLORIDE 102 04/17/2019    CO2 28 04/17/2019    ANIONGAP 6 04/17/2019     (H) 04/17/2019    BUN 41 (H) 04/17/2019    CR 2.18 (H) 04/17/2019    GFRESTIMATED 28 (L) 04/17/2019    GFRESTBLACK 33 (L) 04/17/2019    TAYLOR 9.1 04/17/2019      Results for TREY BURGER (MRN [id number]) as of 4/19/2019 08:21   Ref. Range 11/28/2015 07:05 4/11/2019 08:43 4/17/2019 09:43   N-Terminal Pro BNP Inpatient Latest Ref Range: 0 - 900 pg/mL 3,875 (H)     N-Terminal Pro Bnp Latest Ref Range: 0 - 450 pg/mL  5,449 (H) 3,557 (H)         Additional pertinent testing:  Echo 4/3/19  Interpretation Summary     Left ventricular systolic function is moderately reduced.  The visual ejection fraction is estimated at 35-40%.  Moderately decreased right ventricular systolic function  The right ventricle is moderately dilated.  There is severe (4+) tricuspid regurgitation.  Dilated IVC (>2.5cm) with no respiratory collapse; right atrial pressure is  estimated at >20mmHg.  There is moderate (2+) mitral regurgitation.  Compared to 2/18, degree of TR is worse. RV is enlarged further. Suggest  referral to valve clinic if clinically appropriate. The study was technically  difficult.      Diana Zarate PA-C  [facility]  Pager [phone]

## 2019-04-19 NOTE — LETTER
4/19/2019    Kevan Bull MD  [address]  [facility] [address]    RE: Misael Fischer       Dear Colleague,    I had the pleasure of seeing Misael Fischer in the [facility].      Cardiology Progress Note    Date of Service: 04/19/2019      Reason for visit: [facility] clinic follow up, nonischemic cardiomyopathy.    Primary cardiologist: Dr. Ivan Larkin, Dr. 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 He has a somewhat complex cardiac history. He has known RV dysfunction and tricuspid regurgitation with RV annular dilatation, for which he was previously evaluated at [facility]. In addition, he has a cardiomyopathy that was initially felt secondary to his arrhythmias, as his EF at one point had near normalized with control of his rhythm. In 2017 he had a right and left heart catheterization performed showing trivial nonobstructive coronary disease, and PA pressures were normal with a mean of 19mmHg. His PCWP was elevated at 20mmHg, and his CI via Kirill (due to severe TR) was severely reduced at 1.9LPM. There was also question of a small right to left shunt and at least moderate mitral insufficiency.    He has had issues with recurrent afib now on amiodarone (complicated by thyroid dysfunction, which is now controlled), and is followed by Dr. Larkin of . 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 It does not appear that he has had an afib ablation.  Optimization of his cardiomyopathy regimen has been challenging, as he is intolerant of beta blockers due to severe fatigue, and is unable to tolerate an ACE-I due to his CKD. Fortunately, over the last few years he had done quite well with the need for minimal diuretics. However, when he saw Dr. Larkin the end of March, he noted  increased pedal edema. A ziopatch was requested to assess his afib burden, with with a repeat echo. Echo showed that his EF remained 35-40%, with continued moderately decreased RV function. His TR was reported now as 4+, which was felt to be worse than previously. He also has ongoing moderate 2+ MR. Mr Fischer was then referred to the [facility] clinic for continued management.    At our first visit he told me he had been periodically adjusting his torsemide between 5mg and 15mg daily based on his swelling, but had required higher dosing recently. Due to his CKD he was trying to maintain on lower doses when possible.  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  He remains very active despite these issues and has not had GANT, chest pain, palpitations, or dizziness. His main complaint is the swelling. He tries his best to remain low sodium, and rarely eats out. At our last visit I asked him to remain on 15mg daily of torsemide daily and start weighing daily as well. In addition, I added Imdur for some afterload reduction. He's back today for a 2 week follow up.    Since last visit, he notes minimal change. A few days ago, via phone, I requested an update and given no change, I increased his torsemide to 20mg daily. Yesterday, his weight went down 1 lb, but again swelling is unchanged. His renal renal function also appears essentially unchanged; though notably, his NT-proBNP has trended down from 5449 to 3557, so hopefully we are starting to make some progress. His BP is down somewhat with these changes, in at 108/54 today, but he continues to deny any dizziness/presyncope.       ASSESSMENT/PLAN:    1. Nonischemic cardiomyopathy.   --Echo March 2019 with EF 35-40%, and moderately decreased RV function. His TR is now 4+, which was felt to be worse than previous. He also has 2+MR. Angiogram in 2017 showed only trival coronary disease; RHC showed no  significant pulmonary hypertension with elevated filling pressures.    --His swelling is essentially unchanged. Will keep his torsemide at 20mg daily for now; hopefully the trend down in the NT-proBNP is an indicator we are starting to make some progress. 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 This will complicate mgmt going forward, but he will call with any concerns.    --He is intolerant of beta blockers due to severe fatigue, and failed ACE-I in the past due to renal dysfunction. He appears to be tolerating Imdur thus far. I am hesitant to add hydralazine given softer BP and planned trip overseas. Can address this again at his follow up visit.     --He will follow up with a [facility] MD after he returns from [place] in May, for continued evaluation of his valvular dysfunction and whether this needs to be addressed further.        2. Atrial fibrillation/flutter.   --Following with EP; he is on amiodarone (complicated by thyroid dysfunction, which is now controlled). 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 Will await results of Ziopatch and whether afib ablation will be considered.    --Continue Eliquis, which he is tolerating well.       Follow up plan:  Return to see [facility] MD next month, with repeat labs.        Orders this Visit:  No orders of the defined types were placed in this encounter.    Orders Placed This Encounter   Medications     torsemide (DEMADEX) 20 MG tablet     Sig: Take 1 tablet (20 mg) by mouth daily As directed by the [facility] clinic     Dispense:  90 tablet     Refill:  3     Please do not fill until patient calls     Medications Discontinued During This Encounter   Medication Reason     torsemide (DEMADEX) 20 MG tablet Reorder           CURRENT MEDICATIONS:  Current  Outpatient Medications   Medication Sig Dispense Refill     Acetaminophen (TYLENOL PO) Take 500 mg by mouth every morning       amiodarone (PACERONE/CODARONE) 200 MG tablet Take 1 tablet (200 mg) by mouth daily 90 tablet 3     apixaban ANTICOAGULANT (ELIQUIS) 5 MG tablet Take 1 tablet (5 mg) by mouth 2 times daily 180 tablet 3     isosorbide mononitrate (IMDUR) 30 MG 24 hr tablet Take 1 tablet (30 mg) by mouth daily 90 tablet 3     levothyroxine (SYNTHROID/LEVOTHROID) 100 MCG tablet Take 88 mcg by mouth 88mcg on all days; 44mcg on Fridays       torsemide (DEMADEX) 20 MG tablet Take 1 tablet (20 mg) by mouth daily As directed by the [facility] clinic 90 tablet 3     VITAMIN D, CHOLECALCIFEROL, PO Take 1,000 Units by mouth daily          ALLERGIES     Allergies   Allergen Reactions     Ace Inhibitors      Due to CKD, renal insufficiency     Pravastatin      Muscle aches       PAST MEDICAL HISTORY:  Past Medical History:   Diagnosis Date     Atrial flutter (H) 11/23/15     AVNRT (AV jade re-entry tachycardia) (H)     status post     BPH (benign prostatic hyperplasia)      CAD (coronary artery disease)     Abnormal Nuc 2014     Cardiomyopathy (H)     Echo 11/2015- EF 35-40%     CHF (congestive heart failure) (H) 11/25/2015    Echo 11/2015- EF 35-40%      CKD (chronic kidney disease)     not on ACE/ARB due to insufficiency     Elevated PSA      Encounter for cardioversion procedure 09/25/2018    1 shock with 120 joules successful      Hypercholesterolemia 7/10/2014     Malaria      Mild pulmonic regurgitation and RV dysfunction by prior echocardiogram      Non-rheumatic tricuspid valve insufficiency      Paroxysmal A-fib (H)      Persistent atrial fibrillation with rapid ventricular response (H)      Pigment deposition      Systolic heart failure (H)     Echo 11/2015- EF 35-40%     Tricuspid regurgitation     Mod 2016       PAST SURGICAL HISTORY:  Past Surgical History:   Procedure Laterality Date     ANESTHESIA CARDIOVERSION  N/A 12/6/2016    Procedure: ANESTHESIA CARDIOVERSION;  Surgeon: GENERIC ANESTHESIA PROVIDER;  Location: [facility] OR     ANESTHESIA CARDIOVERSION N/A 6/4/2018    Procedure: ANESTHESIA CARDIOVERSION;  ANESTHESIA CARDIOVERSION ;  Surgeon: GENERIC ANESTHESIA PROVIDER;  Location: [facility] OR     ANESTHESIA CARDIOVERSION N/A 9/25/2018    Procedure: ANESTHESIA CARDIOVERSION;  ANESTHESIA CARDIOVERSION ;  Surgeon: GENERIC ANESTHESIA PROVIDER;  Location: [facility] OR     C KP (TRANSESOPHAGEAL ECHO)  12/6/16     CARDIOVERSION  12/14/15     H ABLATION ATRIAL FLUTTER  3/21/16     ORTHOPEDIC SURGERY Left 1957    foot injury repair       FAMILY HISTORY:  Family History   Problem Relation Age of Onset     C.A.D. Mother      Prostate Cancer Father 73       SOCIAL HISTORY:  Social History     Socioeconomic History     Marital status:      Spouse name: Not on file     Number of children: Not on file     Years of education: Not on file     Highest education level: Not on file   Occupational History     Not on file   Social Needs     Financial resource strain: Not on file     Food insecurity:     Worry: Not on file     Inability: Not on file     Transportation needs:     Medical: Not on file     Non-medical: Not on file   Tobacco Use     Smoking status: Never Smoker     Smokeless tobacco: Never Used   Substance and Sexual Activity     Alcohol use: Yes     Comment: rare     Drug use: No     Sexual activity: Not Currently   Lifestyle     Physical activity:     Days per week: Not on file     Minutes per session: Not on file     Stress: Not on file   Relationships     Social connections:     Talks on phone: Not on file     Gets together: Not on file     Attends Restoration service: Not on file     Active member of club or organization: Not on file     Attends meetings of clubs or organizations: Not on file     Relationship status: Not on file     Intimate partner violence:     Fear of current or ex partner: Not on file     Emotionally abused: Not on  file     Physically abused: Not on file     Forced sexual activity: Not on file   Other Topics Concern     Parent/sibling w/ CABG, MI or angioplasty before 65F 55M? Not Asked      Service Not Asked     Blood Transfusions Not Asked     Caffeine Concern No     Occupational Exposure No     Comment: none     Hobby Hazards Not Asked     Sleep Concern No     Stress Concern No     Weight Concern No     Special Diet Yes     Comment: low salt     Back Care Not Asked     Exercise Yes     Comment: walking     Bike Helmet Not Asked     Seat Belt Yes     Self-Exams Not Asked   Social History Narrative     Not on file       Review of Systems:  Cardiovascular: negative for chest pain, palpitations, pos LE edema  Constitutional: negative for chills, sweats, fevers. Pos occasional fatigue.  Resp: Negative for dyspnea at rest, dyspnea on exertion, cough, known chronic lung disease  HEENT: Negative for new visual changes, frequent headaches  Gastrointestinal: negative for abdominal pain, diarrhea, nausea, vomiting  Hematologic/lymphatic: pos for current systemic anticoagulation, neg hx of blood clots  Musculoskeletal: negative for new back pain, joint pain  Neurological: negative for focal weakness, LOC, seizures, syncope/presyncope.       Physical Exam:  Vitals: /54 (BP Location: Right arm, Patient Position: Sitting, Cuff Size: Adult Regular)   Pulse 62   Resp 19   Wt 67.1 kg (148 lb)   SpO2 95%   BMI 22.50 kg/m      Wt Readings from Last 4 Encounters:   04/19/19 67.1 kg (148 lb)   04/11/19 67 kg (147 lb 11.2 oz)   03/27/19 67.6 kg (149 lb)   12/21/18 64.8 kg (142 lb 12.8 oz)       GEN:  In general, this is a well nourished  male in no acute distress on room air.  Patient ambulatory, unaccompanied today.  HEENT:  Pupils equal, round. Sclerae nonicteric.   NECK: Supple, no masses appreciated. Trachea midline, +JVD to jaw.    C/V:  Appeared regular on exam today. 1/6 MILDRED, heard best at LSB. No rub or gallop.  No S3 or RV heave.   RESP: Respirations are unlabored. No use of accessory muscles. Clear to auscultation bilaterally without wheezing, rales, or rhonchi.  GI: Abdomen soft, nontender, nondistended. No HSM appreciated.   EXTREM: 1-2+ bilateral pitting edema to knees.  No cyanosis or clubbing.  NEURO: Alert and oriented, cooperative. Gait not assessed. No obvious focal deficits.   PSYCH: Normal affect.  SKIN: Warm and dry. No rashes or petechiae appreciated.       Recent Lab Results:    BMP RESULTS:  Lab Results   Component Value Date     04/17/2019    POTASSIUM 4.7 04/17/2019    CHLORIDE 102 04/17/2019    CO2 28 04/17/2019    ANIONGAP 6 04/17/2019     (H) 04/17/2019    BUN 41 (H) 04/17/2019    CR 2.18 (H) 04/17/2019    GFRESTIMATED 28 (L) 04/17/2019    GFRESTBLACK 33 (L) 04/17/2019    TAYLOR 9.1 04/17/2019      Results for TREY BURGER (MRN [id number]) as of 4/19/2019 08:21   Ref. Range 11/28/2015 07:05 4/11/2019 08:43 4/17/2019 09:43   N-Terminal Pro BNP Inpatient Latest Ref Range: 0 - 900 pg/mL 3,875 (H)     N-Terminal Pro Bnp Latest Ref Range: 0 - 450 pg/mL  5,449 (H) 3,557 (H)         Additional pertinent testing:  Echo 4/3/19  Interpretation Summary     Left ventricular systolic function is moderately reduced.  The visual ejection fraction is estimated at 35-40%.  Moderately decreased right ventricular systolic function  The right ventricle is moderately dilated.  There is severe (4+) tricuspid regurgitation.  Dilated IVC (>2.5cm) with no respiratory collapse; right atrial pressure is  estimated at >20mmHg.  There is moderate (2+) mitral regurgitation.  Compared to 2/18, degree of TR is worse. RV is enlarged further. Suggest  referral to valve clinic if clinically appropriate. The study was technically  difficult.      Diana Zarate PA-C  [facility]  Pager [phone]      Thank you for allowing me to participate in the care of your patient.      Sincerely,     JESSICA Berg     [facility]  [facility]

## 2019-04-19 NOTE — PATIENT INSTRUCTIONS
Visit Summary:    Today we discussed:   No changes today.  Please call with any worsening swelling concerns.     Test results:   Results for TREY BURGER (MRN [id number]) as of 4/19/2019 07:58   Ref. Range 4/17/2019 09:43   Sodium Latest Ref Range: 133 - 144 mmol/L 136   Potassium Latest Ref Range: 3.4 - 5.3 mmol/L 4.7   Chloride Latest Ref Range: 94 - 109 mmol/L 102   Carbon Dioxide Latest Ref Range: 20 - 32 mmol/L 28   Urea Nitrogen Latest Ref Range: 7 - 30 mg/dL 41 (H)   Creatinine Latest Ref Range: 0.66 - 1.25 mg/dL 2.18 (H)   GFR Estimate Latest Ref Range: >60 mL/min/1.73_m2 28 (L)   GFR Estimate If Black Latest Ref Range: >60 mL/min/1.73_m2 33 (L)   Calcium Latest Ref Range: 8.5 - 10.1 mg/dL 9.1   Anion Gap Latest Ref Range: 3 - 14 mmol/L 6     Follow up:   With Dr Snow in May with labs prior to visit.

## 2019-04-22 ENCOUNTER — CARE COORDINATION (OUTPATIENT)
Dept: CARDIOLOGY | Facility: CLINIC | Age: 74
End: 2019-04-22

## 2019-04-23 NOTE — PROGRESS NOTES
"Diana Zarate PA Gerdowsky, Christina, RN   Caller: Unspecified (Yesterday,  2:44 PM)             I am fairly certain he is traveling [place] for several weeks in the near future.   Will keep him at 20mg daily of the torsemide for now. If he has trouble with worsening edema while there he can take an extra 10mg on those days until seen back when he returns.   Thanks   Diana      Wt today is 245 lbs. LE edema remains. Instructed pt to continue on torsemide 20 mg daily and he can take an extra 10 mg (half tablet) on days when he has trouble with worsening edema. Pt repeated back correctly.  Pt did mention that since starting on Imdur he has a \"slight headache, not bothersome but I can tell it's there.\" Did educate pt on common side effects of medication and as his body adjusts to medication, headache should subside. Encouraged pt to call if headaches worsens.   Will touch base with pt on Friday 4/26 as he leaves for [place] on Saturday. He will return on 5/15.   Helen Peters RN 10:13 AM 04/23/19    "

## 2019-04-29 NOTE — PROGRESS NOTES
Pt left VM over the wkend with wt and sx update.   Wt did go up to 248 lbs then decreased to 245 lbs. He did take an extra torsemide when wt was 248 lbs.  Pt feeling well. Headaches improving.   Pt is in [place] on a [place] Trip and will return 5/15/19.  Helen Peters RN 9:21 AM 04/29/19

## 2019-04-30 ENCOUNTER — TELEPHONE (OUTPATIENT)
Dept: CARDIOLOGY | Facility: CLINIC | Age: 74
End: 2019-04-30

## 2019-04-30 NOTE — TELEPHONE ENCOUNTER
Called patient to review results, patient's wife states the patient was out of the country. Results and Dr Larkin's recommendation given to wife to give patient at their next contact. Verbalized understanding.              Ivan Larkin MD  [facility] Heart Ep Nurse             No AF noted. pvc burden at normal range for him at 6%. Continue with current meds.

## 2019-05-16 DIAGNOSIS — I50.20 SYSTOLIC CONGESTIVE HEART FAILURE, UNSPECIFIED HF CHRONICITY (H): Primary | ICD-10-CM

## 2019-05-18 ENCOUNTER — TRANSFERRED RECORDS (OUTPATIENT)
Dept: FAMILY MEDICINE | Facility: CLINIC | Age: 74
End: 2019-05-18

## 2019-05-24 ENCOUNTER — OFFICE VISIT (OUTPATIENT)
Dept: CARDIOLOGY | Facility: CLINIC | Age: 74
End: 2019-05-24
Payer: COMMERCIAL

## 2019-05-24 VITALS
HEIGHT: 68 IN | WEIGHT: 141.9 LBS | BODY MASS INDEX: 21.5 KG/M2 | SYSTOLIC BLOOD PRESSURE: 122 MMHG | DIASTOLIC BLOOD PRESSURE: 76 MMHG | HEART RATE: 72 BPM

## 2019-05-24 DIAGNOSIS — I34.0 NON-RHEUMATIC MITRAL REGURGITATION: ICD-10-CM

## 2019-05-24 DIAGNOSIS — I50.22 CHRONIC SYSTOLIC HEART FAILURE (H): Primary | ICD-10-CM

## 2019-05-24 DIAGNOSIS — I50.20 SYSTOLIC CONGESTIVE HEART FAILURE, UNSPECIFIED HF CHRONICITY (H): ICD-10-CM

## 2019-05-24 LAB
ANION GAP SERPL CALCULATED.3IONS-SCNC: 4 MMOL/L (ref 3–14)
BUN SERPL-MCNC: 36 MG/DL (ref 7–30)
CALCIUM SERPL-MCNC: 9.1 MG/DL (ref 8.5–10.1)
CHLORIDE SERPL-SCNC: 103 MMOL/L (ref 94–109)
CO2 SERPL-SCNC: 30 MMOL/L (ref 20–32)
CREAT SERPL-MCNC: 1.95 MG/DL (ref 0.66–1.25)
GFR SERPL CREATININE-BSD FRML MDRD: 33 ML/MIN/{1.73_M2}
GLUCOSE SERPL-MCNC: 95 MG/DL (ref 70–99)
POTASSIUM SERPL-SCNC: 4.5 MMOL/L (ref 3.4–5.3)
SODIUM SERPL-SCNC: 138 MMOL/L (ref 133–144)

## 2019-05-24 PROCEDURE — 80048 BASIC METABOLIC PNL TOTAL CA: CPT | Performed by: INTERNAL MEDICINE

## 2019-05-24 PROCEDURE — 99205 OFFICE O/P NEW HI 60 MIN: CPT | Performed by: INTERNAL MEDICINE

## 2019-05-24 PROCEDURE — 36415 COLL VENOUS BLD VENIPUNCTURE: CPT | Performed by: INTERNAL MEDICINE

## 2019-05-24 RX ORDER — METOPROLOL SUCCINATE 25 MG/1
12.5 TABLET, EXTENDED RELEASE ORAL DAILY
Qty: 30 TABLET | Refills: 3 | Status: SHIPPED | OUTPATIENT
Start: 2019-05-24 | End: 2019-06-27

## 2019-05-24 ASSESSMENT — MIFFLIN-ST. JEOR: SCORE: 1353.15

## 2019-05-24 NOTE — LETTER
5/24/2019      Kevan Bull MD  [address]  [facility] [address]      RE: Misael Fischer       Dear Colleague,    I had the pleasure of seeing Misael Fischer in the [facility].    Service Date: 05/24/2019      REASON FOR VISIT:  Severe biventricular heart failure.      HISTORY OF PRESENT ILLNESS:  This is a very pleasant 73-year-old gentleman who used to follow up with Dr. Castro for many years and Dr. Ivan Larkin for EP.  Since Dr. Castro has retired from practice, he has been following up with me and has been enrolled in the [facility].  He has complex set of medical problems which I will try to summarize to the best of my abilities below after almost a 40-60 minute chart review:   1.  Believe the patient was diagnosed with atrial flutter in 2015.  At that time, he had new onset cardiomyopathy with EF of 40%.   2.  Seen by Dr. 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  Of note, during that time he was noted to have PVCs that were infrequent during the case and there were 3 different morphologies for which he was conservatively managed. Maintained on long-term amiodarone since then.  3.  Subsequently, EF improved from 40 up to 55%.  At that time, he had mild RV dysfunction that got worse over the following months with increasing tricuspid regurgitation.   4.  He was evaluated by [facility], at that time seen by Dr. Mays, I do not have records for that, for what appears to be severe tricuspid regurgitation and worsening RV dysfunction.  He was recommended to have surgery, but tells me today that he was quoted a high risk for surgery and as such declined.   5.  Around 2017, he had worsening LV dysfunction in the setting of recurrence of atrial arrhythmias, but this time it was atrial fibrillation.  He was cardioverted.  Since  then has been in normal rhythm.   6.  Also had coronary angiography around 2017 with right heart catheterization that showed nonobstructive disease and mildly elevated biventricular filling pressures.   7.  Chronic anticoagulation use for atrial arrhythmias and chronic suppression of atrial fibrillation with amiodarone at least since 2015.   8.  High burden of PVCs in the past.  Most recent ZIO Patch monitor 04/2019 showing about 6% burden without AF.   9.  Thyroid issues secondary to amiodarone use, now on thyroid supplementation and stable numbers.      Last echocardiogram 04/2019 showing LVEF of 35%-40% with moderately decreased right ventricular systolic function with moderate dilatation in the setting of severe tricuspid regurgitation, IVC dilatation.  Moderate mitral regurgitation noted on that study as well.      Mr. Fischer is here for followup and establishing care with me in [facility] today.  He is a delightful 73-year-old gentleman who is functionally very well.  He travels about 7-8 times a year outside of the [place] and his volume status is very well maintained on just 20 mg of daily torsemide.  He says when he travels out of country he does not need to take his torsemide and feels well.  From a functional standpoint, he is NYHA class II at worst.  He says he can walk around the mall without any functional limitations.  Climbing a few flights of stairs is not a problem for him, but sometimes limited by knee pain.  Denies chest pain, anginal symptoms, shortness of breath, heart failure, symptoms of shortness of breath, syncope or presyncope.      CURRENT MEDICATIONS:   1.  Amiodarone 200 mg daily.   2.  Apixaban 5 mg b.i.d.   3.  Imdur 30 mg daily.   4.  Levothyroxine.   5.  Torsemide 20 mg daily.   6.  Metoprolol 12.5 mg daily that we have started today.      PHYSICAL EXAMINATION:   VITAL SIGNS:  Blood pressure today is 122/76 with a pulse of 72.  Weight is 141 pounds, BMI is 21.   GENERAL:   Alert and oriented x3, no acute distress.   NECK:  Supple.  JVP is normal.  TR is evident.   LUNGS:  Clear to auscultation bilaterally.   CARDIAC:  Sounds are regular.  There are significant systolic murmurs heard in the axilla and in the right parasternal areas consistent with MR and TR.  There are no obvious gallops or rubs.   ABDOMEN:  Soft, nontender, nondistended.  No signs of ascites.   EXTREMITIES:  Warm without edema.   PSYCHIATRIC:  Appropriate affect.   NEUROLOGIC:  Gross motor neuro exam was nonfocal.      ASSESSMENT AND PLAN:  Mr. Fischer is doing well overall from a clinical standpoint.  However, I am quite concerned about his echocardiogram findings and the longstanding persistent nature of his tricuspid regurgitation.  He does have chronic kidney disease with his creatinine fluctuating between the 1.5-2 range.  My guess is this is a reflection of poor forward flow as demonstrated on his right heart catheterization with low cardiac output secondary to biventricular dysfunction and congestion from profound RV dysfunction and TR.  Fortunately, his albumin levels are reasonable and there are no obvious signs of liver dysfunction that have ensued at this time at least.  However, I am quite concerned about leaving his tricuspid valve alone as this is a quite functional 73-year-old gentleman who has high risk of worsening right heart failure.  Unfortunately as Dr. Casrto mentioned, we do not know what has caused his RV dysfunction. PFTs were normal. He is not obese. He is anticoagulated.        At this time, his surgical candidacy is questionable.  I personally reviewed his echocardiogram as well which shows that his MR is moderate and that goes along with his symptomatology (no SOB), but his TR is indeed severe.  At this time, I recommend doing another transesophageal echocardiogram with 3D KP guidance to better evaluate mitral regurgitation severity with also evaluating his tricuspid valve at the same time.  Prefer if we can use the iGuiders probe for TV imaging.     We believe at this time that his TR is severe, MR is truly moderate.  If this indeed is the case, I want him to see Dr. Kidd with CV Surgery to discuss potential options for minimally invasive tricuspid valve repair if that is a possibility.  If he declines surgery or he is thought not to be a surgical candidate or prohibitively high risk for surgery, then in that case (assuming his MR is moderate and does not need a MitraClip) I will refer him to [facility] to be considered for one of the tricuspid valve clip trials.  On the other hand, if his MR does indeed turn out to be severe, then again surgical consultation, and if turned down, potential evaluation for both mitral and tricuspid valve clips can be decided at that time, but first let us see what his KP shows.      From an EP standpoint, he is in sinus rhythm today.  He is tolerating anticoagulation well.  I will defer decisions on management of his amiodarone and related followup labs/ PFTs to EP.  At this time, based on his last ZIO Patch in 04/2019, it seems that there was no atrial fibrillation and there was 6% PVC burden with some nonsustained VT.      I will have the patient see us in clinic in about 6 weeks to follow up on this plan.  He may need another right heart catheterization depending on what we decide moving forward or at the request of CV Surgery, but we will see.  I will personally see the patient back in about 3-6 months.  I will have Diana Elliot put those orders in, depending on when she thinks it is needed.      Please do not hesitate to contact me with any questions.  It was a pleasure taking care of Mr. Fischer.  He is quite complicated and I have tried my best to summarize his history above.     Plan:  1. KP  2. CV surgery consultation  3. Re-challenge with low dose metoprolol XL 12.5 mg daily  4. EP follow up and amiodarone labs/ PFTs etc     cc:   Kevan Bull,  MD   [facility]    [address] [place]  [address]      Ivan Lakrin MD   [facility] at [address]  [address]      Diana Zarate PA-C   [facility]    [address] [place]  [address]      Stephanie Jaimes PA-C   [facility] at [address]  [address]         VIDHYA SNOW MD             D: 2019   T: 2019   MT: NICOLLE      Name:     TREY BURGER   MRN:      [id number]        Account:      [id number]   :      1945           Service Date: 2019      Document: [id number]           Outpatient Encounter Medications as of 2019   Medication Sig Dispense Refill     Acetaminophen (TYLENOL PO) Take 500 mg by mouth every morning       amiodarone (PACERONE/CODARONE) 200 MG tablet Take 1 tablet (200 mg) by mouth daily 90 tablet 3     apixaban ANTICOAGULANT (ELIQUIS) 5 MG tablet Take 1 tablet (5 mg) by mouth 2 times daily 180 tablet 3     isosorbide mononitrate (IMDUR) 30 MG 24 hr tablet Take 1 tablet (30 mg) by mouth daily 90 tablet 3     levothyroxine (SYNTHROID/LEVOTHROID) 100 MCG tablet Take 88 mcg by mouth 88mcg on all days; 44mcg on        metoprolol succinate ER (TOPROL-XL) 25 MG 24 hr tablet Take 0.5 tablets (12.5 mg) by mouth daily 30 tablet 3     torsemide (DEMADEX) 20 MG tablet Take 1 tablet (20 mg) by mouth daily As directed by the [facility] clinic 90 tablet 3     VITAMIN D, CHOLECALCIFEROL, PO Take 1,000 Units by mouth daily        No facility-administered encounter medications on file as of 2019.            Again, thank you for allowing me to participate in the care of your patient.      Sincerely,    Vidhya Snow MD     [facility]

## 2019-05-24 NOTE — PROGRESS NOTES
Service Date: 05/24/2019      REASON FOR VISIT:  Severe biventricular heart failure.      HISTORY OF PRESENT ILLNESS:  This is a very pleasant 73-year-old gentleman who used to follow up with Dr. Castro for many years and Dr. Ivan Larkin for EP.  Since Dr. Castro has retired from practice, he has been following up with me and has been enrolled in the [facility].  He has complex set of medical problems which I will try to summarize to the best of my abilities below after almost a 40-60 minute chart review:   1.  Believe the patient was diagnosed with atrial flutter in 2015.  At that time, he had new onset cardiomyopathy with EF of 40%.   2.  Seen by Dr. 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  Of note, during that time he was noted to have PVCs that were infrequent during the case and there were 3 different morphologies for which he was conservatively managed. Maintained on long-term amiodarone since then.  3.  Subsequently, EF improved from 40 up to 55%.  At that time, he had mild RV dysfunction that got worse over the following months with increasing tricuspid regurgitation.   4.  He was evaluated by [facility], at that time seen by Dr. Mays, I do not have records for that, for what appears to be severe tricuspid regurgitation and worsening RV dysfunction.  He was recommended to have surgery, but tells me today that he was quoted a high risk for surgery and as such declined.   5.  Around 2017, he had worsening LV dysfunction in the setting of recurrence of atrial arrhythmias, but this time it was atrial fibrillation.  He was cardioverted.  Since then has been in normal rhythm.   6.  Also had coronary angiography around 2017 with right heart catheterization that showed nonobstructive disease and mildly elevated biventricular filling pressures.   7.  Chronic  anticoagulation use for atrial arrhythmias and chronic suppression of atrial fibrillation with amiodarone at least since 2015.   8.  High burden of PVCs in the past.  Most recent ZIO Patch monitor 04/2019 showing about 6% burden without AF.   9.  Thyroid issues secondary to amiodarone use, now on thyroid supplementation and stable numbers.      Last echocardiogram 04/2019 showing LVEF of 35%-40% with moderately decreased right ventricular systolic function with moderate dilatation in the setting of severe tricuspid regurgitation, IVC dilatation.  Moderate mitral regurgitation noted on that study as well.      Mr. Fischer is here for followup and establishing care with me in [facility] today.  He is a delightful 73-year-old gentleman who is functionally very well.  He travels about 7-8 times a year outside of the [place] and his volume status is very well maintained on just 20 mg of daily torsemide.  He says when he travels out of country he does not need to take his torsemide and feels well.  From a functional standpoint, he is NYHA class II at worst.  He says he can walk around the mall without any functional limitations.  Climbing a few flights of stairs is not a problem for him, but sometimes limited by knee pain.  Denies chest pain, anginal symptoms, shortness of breath, heart failure, symptoms of shortness of breath, syncope or presyncope.      CURRENT MEDICATIONS:   1.  Amiodarone 200 mg daily.   2.  Apixaban 5 mg b.i.d.   3.  Imdur 30 mg daily.   4.  Levothyroxine.   5.  Torsemide 20 mg daily.   6.  Metoprolol 12.5 mg daily that we have started today.      PHYSICAL EXAMINATION:   VITAL SIGNS:  Blood pressure today is 122/76 with a pulse of 72.  Weight is 141 pounds, BMI is 21.   GENERAL:  Alert and oriented x3, no acute distress.   NECK:  Supple.  JVP is normal.  TR is evident.   LUNGS:  Clear to auscultation bilaterally.   CARDIAC:  Sounds are regular.  There are significant systolic murmurs heard in  the axilla and in the right parasternal areas consistent with MR and TR.  There are no obvious gallops or rubs.   ABDOMEN:  Soft, nontender, nondistended.  No signs of ascites.   EXTREMITIES:  Warm without edema.   PSYCHIATRIC:  Appropriate affect.   NEUROLOGIC:  Gross motor neuro exam was nonfocal.      ASSESSMENT AND PLAN:  Mr. Fischer is doing well overall from a clinical standpoint.  However, I am quite concerned about his echocardiogram findings and the longstanding persistent nature of his tricuspid regurgitation.  He does have chronic kidney disease with his creatinine fluctuating between the 1.5-2 range.  My guess is this is a reflection of poor forward flow as demonstrated on his right heart catheterization with low cardiac output secondary to biventricular dysfunction and congestion from profound RV dysfunction and TR.  Fortunately, his albumin levels are reasonable and there are no obvious signs of liver dysfunction that have ensued at this time at least.  However, I am quite concerned about leaving his tricuspid valve alone as this is a quite functional 73-year-old gentleman who has high risk of worsening right heart failure.  Unfortunately as Dr. Castro mentioned, we do not know what has caused his RV dysfunction. PFTs were normal. He is not obese. He is anticoagulated.    At this time, his surgical candidacy is questionable.  I personally reviewed his echocardiogram as well which shows that his MR is moderate and that goes along with his symptomatology (no SOB), but his TR is indeed severe.  At this time, I recommend doing another transesophageal echocardiogram with 3D KP guidance to better evaluate mitral regurgitation severity with also evaluating his tricuspid valve at the same time. Prefer if we can use the GE probe for TV imaging.     We believe at this time that his TR is severe, MR is truly moderate.  If this indeed is the case, I want him to see Dr. Kidd with CV Surgery to discuss potential  options for minimally invasive tricuspid valve repair if that is a possibility.  If he declines surgery or he is thought not to be a surgical candidate or prohibitively high risk for surgery, then in that case (assuming his MR is moderate and does not need a MitraClip) I will refer him to [facility] to be considered for one of the tricuspid valve clip trials.  On the other hand, if his MR does indeed turn out to be severe, then again surgical consultation, and if turned down, potential evaluation for both mitral and tricuspid valve clips can be decided at that time, but first let us see what his KP shows.      From an EP standpoint, he is in sinus rhythm today.  He is tolerating anticoagulation well.  I will defer decisions on management of his amiodarone and related followup labs/ PFTs to EP.  At this time, based on his last ZIO Patch in 04/2019, it seems that there was no atrial fibrillation and there was 6% PVC burden with some nonsustained VT.      I will have the patient see us in clinic in about 6 weeks to follow up on this plan.  He may need another right heart catheterization depending on what we decide moving forward or at the request of CV Surgery, but we will see.  I will personally see the patient back in about 3-6 months.  I will have Diana Elliot put those orders in, depending on when she thinks it is needed.      Please do not hesitate to contact me with any questions.  It was a pleasure taking care of Mr. Fischer.  He is quite complicated and I have tried my best to summarize his history above.     Plan:  1. KP  2. CV surgery consultation  3. Re-challenge with low dose metoprolol XL 12.5 mg daily  4. EP follow up and amiodarone labs/ PFTs etc     cc:   Kevan Bull MD   [facility]    [address] [place]  [address]      Ivan Larkin MD   [facility] Heart at [address]  [address]       Diana Zarate PA-C   [facility]    [address] [place]  [address]      Stephanie Jaimes PA-C   [facility] at [place] [id number]         KEYSHA REA MD             D: 2019   T: 2019   MT: NICOLLE      Name:     TREY BURGER   MRN:      [id number]        Account:      [id number]   :      1945           Service Date: 2019      Document: [id number]

## 2019-05-24 NOTE — PROGRESS NOTES
HPI and Plan:   See dictation [id number]    Orders Placed This Encounter   Procedures     Follow-Up with [facility] - [facility] visit     CARDIOVASCULAR SURGERY REFERRAL     Transesophageal Echocardiogram     Orders Placed This Encounter   Medications     metoprolol succinate ER (TOPROL-XL) 25 MG 24 hr tablet     Sig: Take 0.5 tablets (12.5 mg) by mouth daily     Dispense:  30 tablet     Refill:  3     There are no discontinued medications.      Encounter Diagnoses   Name Primary?     Chronic systolic heart failure (H) Yes     Non-rheumatic mitral regurgitation        CURRENT MEDICATIONS:  Current Outpatient Medications   Medication Sig Dispense Refill     Acetaminophen (TYLENOL PO) Take 500 mg by mouth every morning       amiodarone (PACERONE/CODARONE) 200 MG tablet Take 1 tablet (200 mg) by mouth daily 90 tablet 3     apixaban ANTICOAGULANT (ELIQUIS) 5 MG tablet Take 1 tablet (5 mg) by mouth 2 times daily 180 tablet 3     isosorbide mononitrate (IMDUR) 30 MG 24 hr tablet Take 1 tablet (30 mg) by mouth daily 90 tablet 3     levothyroxine (SYNTHROID/LEVOTHROID) 100 MCG tablet Take 88 mcg by mouth 88mcg on all days; 44mcg on Fridays       metoprolol succinate ER (TOPROL-XL) 25 MG 24 hr tablet Take 0.5 tablets (12.5 mg) by mouth daily 30 tablet 3     torsemide (DEMADEX) 20 MG tablet Take 1 tablet (20 mg) by mouth daily As directed by the [facility] 90 tablet 3     VITAMIN D, CHOLECALCIFEROL, PO Take 1,000 Units by mouth daily          ALLERGIES     Allergies   Allergen Reactions     Ace Inhibitors      Due to CKD, renal insufficiency     Pravastatin      Muscle aches       PAST MEDICAL HISTORY:  Past Medical History:   Diagnosis Date     Atrial flutter (H) 11/23/15     AVNRT (AV jade re-entry tachycardia) (H)     status post     BPH (benign prostatic hyperplasia)      CAD (coronary artery disease)     Abnormal Nuc 2014     Cardiomyopathy (H)     Echo 11/2015- EF 35-40%     CHF (congestive heart failure) (H) 11/25/2015     Echo 11/2015- EF 35-40%      CKD (chronic kidney disease)     not on ACE/ARB due to insufficiency     Elevated PSA      Encounter for cardioversion procedure 09/25/2018    1 shock with 120 joules successful      Hypercholesterolemia 7/10/2014     Malaria      Mild pulmonic regurgitation and RV dysfunction by prior echocardiogram      Non-rheumatic tricuspid valve insufficiency      Paroxysmal A-fib (H)      Persistent atrial fibrillation with rapid ventricular response (H)      Pigment deposition      Systolic heart failure (H)     Echo 11/2015- EF 35-40%     Tricuspid regurgitation     Mod 2016       PAST SURGICAL HISTORY:  Past Surgical History:   Procedure Laterality Date     ANESTHESIA CARDIOVERSION N/A 12/6/2016    Procedure: ANESTHESIA CARDIOVERSION;  Surgeon: GENERIC ANESTHESIA PROVIDER;  Location:  OR     ANESTHESIA CARDIOVERSION N/A 6/4/2018    Procedure: ANESTHESIA CARDIOVERSION;  ANESTHESIA CARDIOVERSION ;  Surgeon: GENERIC ANESTHESIA PROVIDER;  Location:  OR     ANESTHESIA CARDIOVERSION N/A 9/25/2018    Procedure: ANESTHESIA CARDIOVERSION;  ANESTHESIA CARDIOVERSION ;  Surgeon: GENERIC ANESTHESIA PROVIDER;  Location:  OR      KP (TRANSESOPHAGEAL ECHO)  12/6/16     CARDIOVERSION  12/14/15     H ABLATION ATRIAL FLUTTER  3/21/16     ORTHOPEDIC SURGERY Left 1957    foot injury repair       FAMILY HISTORY:  Family History   Problem Relation Age of Onset     C.A.D. Mother      Prostate Cancer Father 73       SOCIAL HISTORY:  Social History     Socioeconomic History     Marital status:      Spouse name: None     Number of children: None     Years of education: None     Highest education level: None   Occupational History     None   Social Needs     Financial resource strain: None     Food insecurity:     Worry: None     Inability: None     Transportation needs:     Medical: None     Non-medical: None   Tobacco Use     Smoking status: Never Smoker     Smokeless tobacco: Never Used   Substance and  "Sexual Activity     Alcohol use: Yes     Comment: rare     Drug use: No     Sexual activity: Not Currently   Lifestyle     Physical activity:     Days per week: None     Minutes per session: None     Stress: None   Relationships     Social connections:     Talks on phone: None     Gets together: None     Attends Bahai service: None     Active member of club or organization: None     Attends meetings of clubs or organizations: None     Relationship status: None     Intimate partner violence:     Fear of current or ex partner: None     Emotionally abused: None     Physically abused: None     Forced sexual activity: None   Other Topics Concern     Parent/sibling w/ CABG, MI or angioplasty before 65F 55M? Not Asked      Service Not Asked     Blood Transfusions Not Asked     Caffeine Concern No     Occupational Exposure No     Comment: none     Hobby Hazards Not Asked     Sleep Concern No     Stress Concern No     Weight Concern No     Special Diet Yes     Comment: low salt     Back Care Not Asked     Exercise Yes     Comment: walking     Bike Helmet Not Asked     Seat Belt Yes     Self-Exams Not Asked   Social History Narrative     None       Review of Systems:  Skin:  Negative     Eyes:  Positive for glasses  ENT:  Negative    Respiratory:  Negative    Cardiovascular:  Negative    Gastroenterology: Negative    Genitourinary:  Negative    Musculoskeletal:  Positive for joint pain  Neurologic:  Negative    Psychiatric:  Negative    Heme/Lymph/Imm:  Negative    Endocrine:  Positive for thyroid disorder    Physical Exam:  Vitals: /76 (BP Location: Right arm, Patient Position: Sitting, Cuff Size: Adult Regular)   Pulse 72   Ht 1.727 m (5' 8\")   Wt 64.4 kg (141 lb 14.4 oz)   BMI 21.58 kg/m      Recent Lab Results:  LIPID RESULTS:  Lab Results   Component Value Date    CHOL 141 10/16/2018    HDL 61 10/16/2018    LDL 70 10/16/2018    TRIG 52 10/16/2018       LIVER ENZYME RESULTS:  Lab Results   Component " Value Date    AST 46 (H) 03/27/2019    ALT 52 03/27/2019       CBC RESULTS:  Lab Results   Component Value Date    WBC 5.1 09/20/2018    WBC 4.4 02/17/2018    RBC 5.20 09/20/2018    RBC 4.44 02/17/2018    HGB 16.6 04/11/2019    HCT 48.6 09/20/2018    MCV 93.5 09/20/2018    MCH 30.8 09/20/2018    MCHC 32.9 (A) 09/20/2018    RDW 14.6 02/17/2018     09/20/2018       BMP RESULTS:  Lab Results   Component Value Date     05/24/2019    POTASSIUM 4.5 05/24/2019    CHLORIDE 103 05/24/2019    CO2 30 05/24/2019    ANIONGAP 4 05/24/2019    GLC 95 05/24/2019    BUN 36 (H) 05/24/2019    CR 1.95 (H) 05/24/2019    GFRESTIMATED 33 (L) 05/24/2019    GFRESTBLACK 38 (L) 05/24/2019    TAYLOR 9.1 05/24/2019        A1C RESULTS:  Lab Results   Component Value Date    A1C 6.0 03/31/2016       INR RESULTS:  Lab Results   Component Value Date    INR 1.16 (H) 11/17/2017    INR 1.80 (H) 03/21/2016           CC  No referring provider defined for this encounter.

## 2019-05-24 NOTE — LETTER
5/24/2019    Kevan Bull MD  [address]  [facility] [address]    RE: Misael Fischer       Dear Colleague,    I had the pleasure of seeing Misael Fischer in the [facility].    HPI and Plan:   See dictation [id number]    Orders Placed This Encounter   Procedures     [facility] with [facility] - [facility] visit     CARDIOVASCULAR SURGERY REFERRAL     Transesophageal Echocardiogram     Orders Placed This Encounter   Medications     metoprolol succinate ER (TOPROL-XL) 25 MG 24 hr tablet     Sig: Take 0.5 tablets (12.5 mg) by mouth daily     Dispense:  30 tablet     Refill:  3     There are no discontinued medications.      Encounter Diagnoses   Name Primary?     Chronic systolic heart failure (H) Yes     Non-rheumatic mitral regurgitation        CURRENT MEDICATIONS:  Current Outpatient Medications   Medication Sig Dispense Refill     Acetaminophen (TYLENOL PO) Take 500 mg by mouth every morning       amiodarone (PACERONE/CODARONE) 200 MG tablet Take 1 tablet (200 mg) by mouth daily 90 tablet 3     apixaban ANTICOAGULANT (ELIQUIS) 5 MG tablet Take 1 tablet (5 mg) by mouth 2 times daily 180 tablet 3     isosorbide mononitrate (IMDUR) 30 MG 24 hr tablet Take 1 tablet (30 mg) by mouth daily 90 tablet 3     levothyroxine (SYNTHROID/LEVOTHROID) 100 MCG tablet Take 88 mcg by mouth 88mcg on all days; 44mcg on Fridays       metoprolol succinate ER (TOPROL-XL) 25 MG 24 hr tablet Take 0.5 tablets (12.5 mg) by mouth daily 30 tablet 3     torsemide (DEMADEX) 20 MG tablet Take 1 tablet (20 mg) by mouth daily As directed by the [facility] 90 tablet 3     VITAMIN D, CHOLECALCIFEROL, PO Take 1,000 Units by mouth daily          ALLERGIES     Allergies   Allergen Reactions     Ace Inhibitors      Due to CKD, renal insufficiency     Pravastatin      Muscle aches       PAST MEDICAL HISTORY:  Past Medical History:   Diagnosis Date     Atrial flutter (H) 11/23/15     AVNRT (AV jade re-entry tachycardia) (H)      status post     BPH (benign prostatic hyperplasia)      CAD (coronary artery disease)     Abnormal Nuc 2014     Cardiomyopathy (H)     Echo 11/2015- EF 35-40%     CHF (congestive heart failure) (H) 11/25/2015    Echo 11/2015- EF 35-40%      CKD (chronic kidney disease)     not on ACE/ARB due to insufficiency     Elevated PSA      Encounter for cardioversion procedure 09/25/2018    1 shock with 120 joules successful      Hypercholesterolemia 7/10/2014     Malaria      Mild pulmonic regurgitation and RV dysfunction by prior echocardiogram      Non-rheumatic tricuspid valve insufficiency      Paroxysmal A-fib (H)      Persistent atrial fibrillation with rapid ventricular response (H)      Pigment deposition      Systolic heart failure (H)     Echo 11/2015- EF 35-40%     Tricuspid regurgitation     Mod 2016       PAST SURGICAL HISTORY:  Past Surgical History:   Procedure Laterality Date     ANESTHESIA CARDIOVERSION N/A 12/6/2016    Procedure: ANESTHESIA CARDIOVERSION;  Surgeon: GENERIC ANESTHESIA PROVIDER;  Location:  OR     ANESTHESIA CARDIOVERSION N/A 6/4/2018    Procedure: ANESTHESIA CARDIOVERSION;  ANESTHESIA CARDIOVERSION ;  Surgeon: GENERIC ANESTHESIA PROVIDER;  Location:  OR     ANESTHESIA CARDIOVERSION N/A 9/25/2018    Procedure: ANESTHESIA CARDIOVERSION;  ANESTHESIA CARDIOVERSION ;  Surgeon: GENERIC ANESTHESIA PROVIDER;  Location: [facility]     C KP (TRANSESOPHAGEAL ECHO)  12/6/16     CARDIOVERSION  12/14/15     H ABLATION ATRIAL FLUTTER  3/21/16     ORTHOPEDIC SURGERY Left 1957    foot injury repair       FAMILY HISTORY:  Family History   Problem Relation Age of Onset     C.A.D. Mother      Prostate Cancer Father 73       SOCIAL HISTORY:  Social History     Socioeconomic History     Marital status:      Spouse name: None     Number of children: None     Years of education: None     Highest education level: None   Occupational History     None   Social Needs     Financial resource strain: None      "Food insecurity:     Worry: None     Inability: None     Transportation needs:     Medical: None     Non-medical: None   Tobacco Use     Smoking status: Never Smoker     Smokeless tobacco: Never Used   Substance and Sexual Activity     Alcohol use: Yes     Comment: rare     Drug use: No     Sexual activity: Not Currently   Lifestyle     Physical activity:     Days per week: None     Minutes per session: None     Stress: None   Relationships     Social connections:     Talks on phone: None     Gets together: None     Attends Roman Catholic service: None     Active member of club or organization: None     Attends meetings of clubs or organizations: None     Relationship status: None     Intimate partner violence:     Fear of current or ex partner: None     Emotionally abused: None     Physically abused: None     Forced sexual activity: None   Other Topics Concern     Parent/sibling w/ CABG, MI or angioplasty before 65F 55M? Not Asked      Service Not Asked     Blood Transfusions Not Asked     Caffeine Concern No     Occupational Exposure No     Comment: none     Hobby Hazards Not Asked     Sleep Concern No     Stress Concern No     Weight Concern No     Special Diet Yes     Comment: low salt     Back Care Not Asked     Exercise Yes     Comment: walking     Bike Helmet Not Asked     Seat Belt Yes     Self-Exams Not Asked   Social History Narrative     None       Review of Systems:  Skin:  Negative     Eyes:  Positive for glasses  ENT:  Negative    Respiratory:  Negative    Cardiovascular:  Negative    Gastroenterology: Negative    Genitourinary:  Negative    Musculoskeletal:  Positive for joint pain  Neurologic:  Negative    Psychiatric:  Negative    Heme/Lymph/Imm:  Negative    Endocrine:  Positive for thyroid disorder    Physical Exam:  Vitals: /76 (BP Location: Right arm, Patient Position: Sitting, Cuff Size: Adult Regular)   Pulse 72   Ht 1.727 m (5' 8\")   Wt 64.4 kg (141 lb 14.4 oz)   BMI 21.58 kg/m   "     Recent Lab Results:  LIPID RESULTS:  Lab Results   Component Value Date    CHOL 141 10/16/2018    HDL 61 10/16/2018    LDL 70 10/16/2018    TRIG 52 10/16/2018       LIVER ENZYME RESULTS:  Lab Results   Component Value Date    AST 46 (H) 03/27/2019    ALT 52 03/27/2019       CBC RESULTS:  Lab Results   Component Value Date    WBC 5.1 09/20/2018    WBC 4.4 02/17/2018    RBC 5.20 09/20/2018    RBC 4.44 02/17/2018    HGB 16.6 04/11/2019    HCT 48.6 09/20/2018    MCV 93.5 09/20/2018    MCH 30.8 09/20/2018    MCHC 32.9 (A) 09/20/2018    RDW 14.6 02/17/2018     09/20/2018       BMP RESULTS:  Lab Results   Component Value Date     05/24/2019    POTASSIUM 4.5 05/24/2019    CHLORIDE 103 05/24/2019    CO2 30 05/24/2019    ANIONGAP 4 05/24/2019    GLC 95 05/24/2019    BUN 36 (H) 05/24/2019    CR 1.95 (H) 05/24/2019    GFRESTIMATED 33 (L) 05/24/2019    GFRESTBLACK 38 (L) 05/24/2019    TAYLOR 9.1 05/24/2019        A1C RESULTS:  Lab Results   Component Value Date    A1C 6.0 03/31/2016       INR RESULTS:  Lab Results   Component Value Date    INR 1.16 (H) 11/17/2017    INR 1.80 (H) 03/21/2016           CC  No referring provider defined for this encounter.                    Thank you for allowing me to participate in the care of your patient.      Sincerely,     Vidhya Snow MD     [facility]    cc:   No referring provider defined for this encounter.

## 2019-06-07 ENCOUNTER — TELEPHONE (OUTPATIENT)
Dept: CARDIOLOGY | Facility: CLINIC | Age: 74
End: 2019-06-07

## 2019-06-11 ENCOUNTER — HOSPITAL ENCOUNTER (OUTPATIENT)
Dept: CARDIOLOGY | Facility: CLINIC | Age: 74
End: 2019-06-11
Attending: INTERNAL MEDICINE | Admitting: INTERNAL MEDICINE
Payer: COMMERCIAL

## 2019-06-11 ENCOUNTER — HOSPITAL ENCOUNTER (OUTPATIENT)
Facility: CLINIC | Age: 74
Discharge: HOME | End: 2019-06-11
Attending: INTERNAL MEDICINE | Admitting: INTERNAL MEDICINE
Payer: COMMERCIAL

## 2019-06-11 VITALS
DIASTOLIC BLOOD PRESSURE: 65 MMHG | SYSTOLIC BLOOD PRESSURE: 102 MMHG | BODY MASS INDEX: 21.33 KG/M2 | TEMPERATURE: 98.3 F | RESPIRATION RATE: 13 BRPM | WEIGHT: 140.31 LBS | OXYGEN SATURATION: 95 % | HEART RATE: 56 BPM

## 2019-06-11 DIAGNOSIS — I50.22 CHRONIC SYSTOLIC HEART FAILURE (H): ICD-10-CM

## 2019-06-11 DIAGNOSIS — I34.0 NON-RHEUMATIC MITRAL REGURGITATION: ICD-10-CM

## 2019-06-11 PROCEDURE — 76376 3D RENDER W/INTRP POSTPROCES: CPT

## 2019-06-11 PROCEDURE — 40000235 ZZH STATISTIC TELEMETRY

## 2019-06-11 PROCEDURE — 93320 DOPPLER ECHO COMPLETE: CPT | Mod: 26 | Performed by: INTERNAL MEDICINE

## 2019-06-11 PROCEDURE — 25800030 ZZH RX IP 258 OP 636: Performed by: INTERNAL MEDICINE

## 2019-06-11 PROCEDURE — 40000857 ZZH STATISTIC TEE INCLUDES SEDATION

## 2019-06-11 PROCEDURE — 93312 ECHO TRANSESOPHAGEAL: CPT | Mod: 26 | Performed by: INTERNAL MEDICINE

## 2019-06-11 PROCEDURE — 25000125 ZZHC RX 250: Performed by: INTERNAL MEDICINE

## 2019-06-11 PROCEDURE — 93325 DOPPLER ECHO COLOR FLOW MAPG: CPT | Mod: 26 | Performed by: INTERNAL MEDICINE

## 2019-06-11 PROCEDURE — 25000128 H RX IP 250 OP 636: Performed by: INTERNAL MEDICINE

## 2019-06-11 RX ORDER — FENTANYL CITRATE 50 UG/ML
25 INJECTION, SOLUTION INTRAMUSCULAR; INTRAVENOUS ONCE
Status: COMPLETED | OUTPATIENT
Start: 2019-06-11 | End: 2019-06-11

## 2019-06-11 RX ORDER — NALOXONE HYDROCHLORIDE 0.4 MG/ML
.1-.4 INJECTION, SOLUTION INTRAMUSCULAR; INTRAVENOUS; SUBCUTANEOUS
Status: DISCONTINUED | OUTPATIENT
Start: 2019-06-11 | End: 2019-06-11 | Stop reason: HOSPADM

## 2019-06-11 RX ORDER — FENTANYL CITRATE 50 UG/ML
25 INJECTION, SOLUTION INTRAMUSCULAR; INTRAVENOUS
Status: DISCONTINUED | OUTPATIENT
Start: 2019-06-11 | End: 2019-06-11 | Stop reason: HOSPADM

## 2019-06-11 RX ORDER — GLYCOPYRROLATE 0.2 MG/ML
0.1 INJECTION, SOLUTION INTRAMUSCULAR; INTRAVENOUS ONCE
Status: COMPLETED | OUTPATIENT
Start: 2019-06-11 | End: 2019-06-11

## 2019-06-11 RX ORDER — ACYCLOVIR 200 MG/1
9.5 CAPSULE ORAL
Status: DISCONTINUED | OUTPATIENT
Start: 2019-06-11 | End: 2019-06-11 | Stop reason: HOSPADM

## 2019-06-11 RX ORDER — SODIUM CHLORIDE 9 MG/ML
INJECTION, SOLUTION INTRAVENOUS CONTINUOUS PRN
Status: DISCONTINUED | OUTPATIENT
Start: 2019-06-11 | End: 2019-06-11 | Stop reason: HOSPADM

## 2019-06-11 RX ORDER — LIDOCAINE HYDROCHLORIDE 40 MG/ML
1.5 SOLUTION TOPICAL ONCE
Status: COMPLETED | OUTPATIENT
Start: 2019-06-11 | End: 2019-06-11

## 2019-06-11 RX ORDER — FLUMAZENIL 0.1 MG/ML
0.2 INJECTION, SOLUTION INTRAVENOUS
Status: DISCONTINUED | OUTPATIENT
Start: 2019-06-11 | End: 2019-06-11 | Stop reason: HOSPADM

## 2019-06-11 RX ORDER — LIDOCAINE 40 MG/G
CREAM TOPICAL
Status: DISCONTINUED | OUTPATIENT
Start: 2019-06-11 | End: 2019-06-11 | Stop reason: HOSPADM

## 2019-06-11 RX ADMIN — MIDAZOLAM HYDROCHLORIDE 0.5 MG: 1 INJECTION, SOLUTION INTRAMUSCULAR; INTRAVENOUS at 09:06

## 2019-06-11 RX ADMIN — MIDAZOLAM HYDROCHLORIDE 0.5 MG: 1 INJECTION, SOLUTION INTRAMUSCULAR; INTRAVENOUS at 08:45

## 2019-06-11 RX ADMIN — GLYCOPYRROLATE 0.1 MG: 0.2 INJECTION, SOLUTION INTRAMUSCULAR; INTRAVENOUS at 08:10

## 2019-06-11 RX ADMIN — TOPICAL ANESTHETIC 0.5 ML: 200 SPRAY DENTAL; PERIODONTAL at 08:33

## 2019-06-11 RX ADMIN — FENTANYL CITRATE 25 MCG: 50 INJECTION, SOLUTION INTRAMUSCULAR; INTRAVENOUS at 08:45

## 2019-06-11 RX ADMIN — LIDOCAINE HYDROCHLORIDE 1.5 ML: 40 SOLUTION TOPICAL at 08:20

## 2019-06-11 RX ADMIN — SODIUM CHLORIDE: 9 INJECTION, SOLUTION INTRAVENOUS at 08:23

## 2019-06-11 RX ADMIN — MIDAZOLAM HYDROCHLORIDE 0.5 MG: 1 INJECTION, SOLUTION INTRAMUSCULAR; INTRAVENOUS at 08:47

## 2019-06-11 RX ADMIN — MIDAZOLAM HYDROCHLORIDE 0.5 MG: 1 INJECTION, SOLUTION INTRAMUSCULAR; INTRAVENOUS at 08:50

## 2019-06-11 RX ADMIN — TOPICAL ANESTHETIC 0.5 ML: 200 SPRAY DENTAL; PERIODONTAL at 08:43

## 2019-06-11 NOTE — PROGRESS NOTES
Care Suites Admission Nursing Note    Reason for admission: KP  CS arrival time: 0639  Accompanied by: Son  Medications held: Na   Consent signed: To be done prior to start of KP with MD  Abnormal assessment/labs: NA   Education/questions answered: Provided written and verbally  Plan: Proceed with KP

## 2019-06-11 NOTE — PROGRESS NOTES
Sedation Post Procedure Summary:    Immediately prior to starting the procedure a Time Out was conducted with procedural staff and re-confirmed the patient s name, procedure, and site/side. Md completed sedation assessment.     Consent obtained from patient after discussing the risks, benefits and alternatives.       Indication:  Sedation was required to allow for KP    Sedatives: Fentanyl 50 Mcg and Versed 1.5 Mg    Vital signs, airway, and pulse oximetry were monitored throughout the procedure and sedation was maintained until the procedure was complete.      Patient tolerance: Patient tolerated the procedure well with no immediate complications.      (See Doc Flow-sheets and MAR for additional information)    Cristi Moreno

## 2019-06-11 NOTE — PROGRESS NOTES
The risks and benefits of KP have been discussed with the patient and/or relatives/ family in detail including the risks of serious complications including rare risk of death, esophageal tear or trauma, emergent surgery, pharyngeal hematoma/ trauma, methemoglobinemia, gastrointestinal bleeding etc. Patient denies any active upper GI issues or bleeding or difficult swallowing. Denies prior sedation related problems. The patient understands the above mentioned risks and is willing to proceed with the KP.

## 2019-06-11 NOTE — DISCHARGE INSTRUCTIONS
KP  (Transesophageal Echocardiogram)  Discharge Instructions    After you go home:      Have an adult stay with you for 6 hours.       For 24 hours - due to the sedation you received:    Relax and take it easy.    Do NOT make any important or legal decisions.    Do NOT drive or operate machines at home or at work.    Do NOT drink alcohol.    Diet:    You may resume your normal diet, but no scratchy foods for two days.    If your throat is sore, eat cold, bland or soft foods.    You may have heartburn if the tube used in the exam entered your stomach.  If so:   - Do not eat acidic and spicy foods.   - Do not eat three hours before bedtime. Clear liquids are okay.   - When lying down, use two pillows to raise your head.    Medicines:      Take your medications, including blood thinners, unless your provider tells you not to.    If you have stopped any medicines, check with your provider about when to restart them.    You may take Tylenol (Acetaminophen) if your throat is sore.    You may take antacids if you have heartburn.      Follow Up Appointments:      Follow up with your cardiologist at [facility] of patient preference as instructed.    Follow up with your primary care provider as needed.    Call the clinic if:      You have heartburn that is severe or lasts more than 72 hours.    You have a sore throat that feels worse after 72 hours.    You have shortness of breath, neck pain, chest pain, fever, chills, coughing up blood, or other unusual signs.    Questions or concerns      [facility] [place]:    [phone] [facility] (7 days a week)

## 2019-06-11 NOTE — PROGRESS NOTES
Care Suites Discharge Summary    Discharge Criteria:   Discharge Criteria met per MD orders: Yes.   Vital signs stable.     Pt demonstrates ability to ambulate safely: Yes.  (See discharge questionnaire for additional information)    Discharge instructions & education:   Discharge instructions reviewed with patient and son. Patient verbalizes  understanding.   Additional patient education provided:  KP home care instructions    Medications:   Patient will be discharging on new medications- No. Patient verbalizes reason for use, start date, and side effects NA.    Items returned to patient:   Home and hospital acquired medications returned to patient NA   Listed belongings gathered and returned to patient: Yes    Patient discharged to home with Son.    Cristi Moreno

## 2019-06-17 ENCOUNTER — OFFICE VISIT (OUTPATIENT)
Dept: CARDIOLOGY | Facility: CLINIC | Age: 74
End: 2019-06-17
Attending: INTERNAL MEDICINE
Payer: COMMERCIAL

## 2019-06-17 ENCOUNTER — OFFICE VISIT (OUTPATIENT)
Dept: CARDIOLOGY | Facility: CLINIC | Age: 74
End: 2019-06-17
Payer: COMMERCIAL

## 2019-06-17 VITALS
WEIGHT: 144.8 LBS | HEIGHT: 68 IN | HEART RATE: 64 BPM | SYSTOLIC BLOOD PRESSURE: 118 MMHG | BODY MASS INDEX: 21.95 KG/M2 | DIASTOLIC BLOOD PRESSURE: 76 MMHG

## 2019-06-17 VITALS
HEIGHT: 68 IN | HEART RATE: 67 BPM | SYSTOLIC BLOOD PRESSURE: 143 MMHG | DIASTOLIC BLOOD PRESSURE: 88 MMHG | BODY MASS INDEX: 21.67 KG/M2 | WEIGHT: 143 LBS

## 2019-06-17 DIAGNOSIS — I50.22 CHRONIC SYSTOLIC CONGESTIVE HEART FAILURE (H): Primary | ICD-10-CM

## 2019-06-17 DIAGNOSIS — I36.1 NON-RHEUMATIC TRICUSPID VALVE INSUFFICIENCY: Primary | ICD-10-CM

## 2019-06-17 DIAGNOSIS — I50.22 CHRONIC SYSTOLIC HEART FAILURE (H): ICD-10-CM

## 2019-06-17 PROCEDURE — 99215 OFFICE O/P EST HI 40 MIN: CPT | Performed by: INTERNAL MEDICINE

## 2019-06-17 ASSESSMENT — MIFFLIN-ST. JEOR
SCORE: 1366.31
SCORE: 1358.01

## 2019-06-17 NOTE — PROGRESS NOTES
HPI and Plan:   See dictation [id number]    No orders of the defined types were placed in this encounter.    No orders of the defined types were placed in this encounter.    There are no discontinued medications.      No diagnosis found.    CURRENT MEDICATIONS:  Current Outpatient Medications   Medication Sig Dispense Refill     Acetaminophen (TYLENOL PO) Take 500 mg by mouth every morning       amiodarone (PACERONE/CODARONE) 200 MG tablet Take 1 tablet (200 mg) by mouth daily 90 tablet 3     apixaban ANTICOAGULANT (ELIQUIS) 5 MG tablet Take 1 tablet (5 mg) by mouth 2 times daily 180 tablet 3     isosorbide mononitrate (IMDUR) 30 MG 24 hr tablet Take 1 tablet (30 mg) by mouth daily 90 tablet 3     levothyroxine (SYNTHROID/LEVOTHROID) 100 MCG tablet Take 88 mcg by mouth 88mcg on all days; 44mcg on Fridays       metoprolol succinate ER (TOPROL-XL) 25 MG 24 hr tablet Take 0.5 tablets (12.5 mg) by mouth daily 30 tablet 3     torsemide (DEMADEX) 20 MG tablet Take 1 tablet (20 mg) by mouth daily As directed by the [facility] 90 tablet 3     VITAMIN D, CHOLECALCIFEROL, PO Take 1,000 Units by mouth daily          ALLERGIES     Allergies   Allergen Reactions     Ace Inhibitors      Due to CKD, renal insufficiency     Pravastatin      Muscle aches       PAST MEDICAL HISTORY:  Past Medical History:   Diagnosis Date     Atrial flutter (H) 11/23/15     AVNRT (AV jade re-entry tachycardia) (H)     status post     BPH (benign prostatic hyperplasia)      CAD (coronary artery disease)     Abnormal Nuc 2014     Cardiomyopathy (H)     Echo 11/2015- EF 35-40%     CHF (congestive heart failure) (H) 11/25/2015    Echo 11/2015- EF 35-40%      CKD (chronic kidney disease)     not on ACE/ARB due to insufficiency     Elevated PSA      Encounter for cardioversion procedure 09/25/2018    1 shock with 120 joules successful      Hypercholesterolemia 7/10/2014     Malaria      Mild pulmonic regurgitation and RV dysfunction by prior echocardiogram       Non-rheumatic tricuspid valve insufficiency      Paroxysmal A-fib (H)      Persistent atrial fibrillation with rapid ventricular response (H)      Pigment deposition      Systolic heart failure (H)     Echo 11/2015- EF 35-40%     Tricuspid regurgitation     Mod 2016       PAST SURGICAL HISTORY:  Past Surgical History:   Procedure Laterality Date     ANESTHESIA CARDIOVERSION N/A 12/6/2016    Procedure: ANESTHESIA CARDIOVERSION;  Surgeon: GENERIC ANESTHESIA PROVIDER;  Location: [facility]     ANESTHESIA CARDIOVERSION N/A 6/4/2018    Procedure: ANESTHESIA CARDIOVERSION;  ANESTHESIA CARDIOVERSION ;  Surgeon: GENERIC ANESTHESIA PROVIDER;  Location: [facility]     ANESTHESIA CARDIOVERSION N/A 9/25/2018    Procedure: ANESTHESIA CARDIOVERSION;  ANESTHESIA CARDIOVERSION ;  Surgeon: GENERIC ANESTHESIA PROVIDER;  Location: [facility] OR     C KP (TRANSESOPHAGEAL ECHO)  12/6/16     CARDIOVERSION  12/14/15     H ABLATION ATRIAL FLUTTER  3/21/16     ORTHOPEDIC SURGERY Left 1957    foot injury repair       FAMILY HISTORY:  Family History   Problem Relation Age of Onset     C.A.D. Mother      Prostate Cancer Father 73       SOCIAL HISTORY:  Social History     Socioeconomic History     Marital status:      Spouse name: None     Number of children: None     Years of education: None     Highest education level: None   Occupational History     None   Social Needs     Financial resource strain: None     Food insecurity:     Worry: None     Inability: None     Transportation needs:     Medical: None     Non-medical: None   Tobacco Use     Smoking status: Never Smoker     Smokeless tobacco: Never Used   Substance and Sexual Activity     Alcohol use: Yes     Comment: rare     Drug use: No     Sexual activity: Not Currently   Lifestyle     Physical activity:     Days per week: None     Minutes per session: None     Stress: None   Relationships     Social connections:     Talks on phone: None     Gets together: None     Attends Holiness service:  "None     Active member of club or organization: None     Attends meetings of clubs or organizations: None     Relationship status: None     Intimate partner violence:     Fear of current or ex partner: None     Emotionally abused: None     Physically abused: None     Forced sexual activity: None   Other Topics Concern     Parent/sibling w/ CABG, MI or angioplasty before 65F 55M? Not Asked      Service Not Asked     Blood Transfusions Not Asked     Caffeine Concern No     Occupational Exposure No     Comment: none     Hobby Hazards Not Asked     Sleep Concern No     Stress Concern No     Weight Concern No     Special Diet Yes     Comment: low salt     Back Care Not Asked     Exercise Yes     Comment: walking     Bike Helmet Not Asked     Seat Belt Yes     Self-Exams Not Asked   Social History Narrative     None       Review of Systems:  Skin:  Negative     Eyes:  Positive for glasses  ENT:  Negative    Respiratory:  Negative    Cardiovascular:  Negative    Gastroenterology: Negative    Genitourinary:  Negative    Musculoskeletal:  Positive for joint pain  Neurologic:  Negative    Psychiatric:  Negative    Heme/Lymph/Imm:  Negative    Endocrine:  Positive for thyroid disorder    Physical Exam:  Vitals: /88   Pulse 67   Ht 1.727 m (5' 7.99\")   Wt 64.9 kg (143 lb)   BMI 21.75 kg/m      Recent Lab Results:  LIPID RESULTS:  Lab Results   Component Value Date    CHOL 141 10/16/2018    HDL 61 10/16/2018    LDL 70 10/16/2018    TRIG 52 10/16/2018       LIVER ENZYME RESULTS:  Lab Results   Component Value Date    AST 46 (H) 03/27/2019    ALT 52 03/27/2019       CBC RESULTS:  Lab Results   Component Value Date    WBC 5.1 09/20/2018    WBC 4.4 02/17/2018    RBC 5.20 09/20/2018    RBC 4.44 02/17/2018    HGB 16.6 04/11/2019    HCT 48.6 09/20/2018    MCV 93.5 09/20/2018    MCH 30.8 09/20/2018    MCHC 32.9 (A) 09/20/2018    RDW 14.6 02/17/2018     09/20/2018       BMP RESULTS:  Lab Results   Component Value " Date     05/24/2019    POTASSIUM 4.5 05/24/2019    CHLORIDE 103 05/24/2019    CO2 30 05/24/2019    ANIONGAP 4 05/24/2019    GLC 95 05/24/2019    BUN 36 (H) 05/24/2019    CR 1.95 (H) 05/24/2019    GFRESTIMATED 33 (L) 05/24/2019    GFRESTBLACK 38 (L) 05/24/2019    TAYLOR 9.1 05/24/2019        A1C RESULTS:  Lab Results   Component Value Date    A1C 6.0 03/31/2016       INR RESULTS:  Lab Results   Component Value Date    INR 1.16 (H) 11/17/2017    INR 1.80 (H) 03/21/2016           CC  Kevan Bull MD  [address] [place] [address]

## 2019-06-17 NOTE — LETTER
6/17/2019    Kevan Bull MD  [address]  [facility] [address]    RE: Misael Fischer       Dear Colleague,    I had the pleasure of seeing Misael Fischer in the [facility].    HPI and Plan:   See dictation [id number]    No orders of the defined types were placed in this encounter.    No orders of the defined types were placed in this encounter.    There are no discontinued medications.      No diagnosis found.    CURRENT MEDICATIONS:  Current Outpatient Medications   Medication Sig Dispense Refill     Acetaminophen (TYLENOL PO) Take 500 mg by mouth every morning       amiodarone (PACERONE/CODARONE) 200 MG tablet Take 1 tablet (200 mg) by mouth daily 90 tablet 3     apixaban ANTICOAGULANT (ELIQUIS) 5 MG tablet Take 1 tablet (5 mg) by mouth 2 times daily 180 tablet 3     isosorbide mononitrate (IMDUR) 30 MG 24 hr tablet Take 1 tablet (30 mg) by mouth daily 90 tablet 3     levothyroxine (SYNTHROID/LEVOTHROID) 100 MCG tablet Take 88 mcg by mouth 88mcg on all days; 44mcg on Fridays       metoprolol succinate ER (TOPROL-XL) 25 MG 24 hr tablet Take 0.5 tablets (12.5 mg) by mouth daily 30 tablet 3     torsemide (DEMADEX) 20 MG tablet Take 1 tablet (20 mg) by mouth daily As directed by the [facility] 90 tablet 3     VITAMIN D, CHOLECALCIFEROL, PO Take 1,000 Units by mouth daily          ALLERGIES     Allergies   Allergen Reactions     Ace Inhibitors      Due to CKD, renal insufficiency     Pravastatin      Muscle aches       PAST MEDICAL HISTORY:  Past Medical History:   Diagnosis Date     Atrial flutter (H) 11/23/15     AVNRT (AV jade re-entry tachycardia) (H)     status post     BPH (benign prostatic hyperplasia)      CAD (coronary artery disease)     Abnormal Nuc 2014     Cardiomyopathy (H)     Echo 11/2015- EF 35-40%     CHF (congestive heart failure) (H) 11/25/2015    Echo 11/2015- EF 35-40%      CKD (chronic kidney disease)     not on ACE/ARB due to insufficiency     Elevated  PSA      Encounter for cardioversion procedure 09/25/2018    1 shock with 120 joules successful      Hypercholesterolemia 7/10/2014     Malaria      Mild pulmonic regurgitation and RV dysfunction by prior echocardiogram      Non-rheumatic tricuspid valve insufficiency      Paroxysmal A-fib (H)      Persistent atrial fibrillation with rapid ventricular response (H)      Pigment deposition      Systolic heart failure (H)     Echo 11/2015- EF 35-40%     Tricuspid regurgitation     Mod 2016       PAST SURGICAL HISTORY:  Past Surgical History:   Procedure Laterality Date     ANESTHESIA CARDIOVERSION N/A 12/6/2016    Procedure: ANESTHESIA CARDIOVERSION;  Surgeon: GENERIC ANESTHESIA PROVIDER;  Location:  OR     ANESTHESIA CARDIOVERSION N/A 6/4/2018    Procedure: ANESTHESIA CARDIOVERSION;  ANESTHESIA CARDIOVERSION ;  Surgeon: GENERIC ANESTHESIA PROVIDER;  Location: [facility]     ANESTHESIA CARDIOVERSION N/A 9/25/2018    Procedure: ANESTHESIA CARDIOVERSION;  ANESTHESIA CARDIOVERSION ;  Surgeon: GENERIC ANESTHESIA PROVIDER;  Location:  OR     C KP (TRANSESOPHAGEAL ECHO)  12/6/16     CARDIOVERSION  12/14/15     H ABLATION ATRIAL FLUTTER  3/21/16     ORTHOPEDIC SURGERY Left 1957    foot injury repair       FAMILY HISTORY:  Family History   Problem Relation Age of Onset     C.A.D. Mother      Prostate Cancer Father 73       SOCIAL HISTORY:  Social History     Socioeconomic History     Marital status:      Spouse name: None     Number of children: None     Years of education: None     Highest education level: None   Occupational History     None   Social Needs     Financial resource strain: None     Food insecurity:     Worry: None     Inability: None     Transportation needs:     Medical: None     Non-medical: None   Tobacco Use     Smoking status: Never Smoker     Smokeless tobacco: Never Used   Substance and Sexual Activity     Alcohol use: Yes     Comment: rare     Drug use: No     Sexual activity: Not Currently  "  Lifestyle     Physical activity:     Days per week: None     Minutes per session: None     Stress: None   Relationships     Social connections:     Talks on phone: None     Gets together: None     Attends Tenriism service: None     Active member of club or organization: None     Attends meetings of clubs or organizations: None     Relationship status: None     Intimate partner violence:     Fear of current or ex partner: None     Emotionally abused: None     Physically abused: None     Forced sexual activity: None   Other Topics Concern     Parent/sibling w/ CABG, MI or angioplasty before 65F 55M? Not Asked      Service Not Asked     Blood Transfusions Not Asked     Caffeine Concern No     Occupational Exposure No     Comment: none     Hobby Hazards Not Asked     Sleep Concern No     Stress Concern No     Weight Concern No     Special Diet Yes     Comment: low salt     Back Care Not Asked     Exercise Yes     Comment: walking     Bike Helmet Not Asked     Seat Belt Yes     Self-Exams Not Asked   Social History Narrative     None       Review of Systems:  Skin:  Negative     Eyes:  Positive for glasses  ENT:  Negative    Respiratory:  Negative    Cardiovascular:  Negative    Gastroenterology: Negative    Genitourinary:  Negative    Musculoskeletal:  Positive for joint pain  Neurologic:  Negative    Psychiatric:  Negative    Heme/Lymph/Imm:  Negative    Endocrine:  Positive for thyroid disorder    Physical Exam:  Vitals: /88   Pulse 67   Ht 1.727 m (5' 7.99\")   Wt 64.9 kg (143 lb)   BMI 21.75 kg/m       Recent Lab Results:  LIPID RESULTS:  Lab Results   Component Value Date    CHOL 141 10/16/2018    HDL 61 10/16/2018    LDL 70 10/16/2018    TRIG 52 10/16/2018       LIVER ENZYME RESULTS:  Lab Results   Component Value Date    AST 46 (H) 03/27/2019    ALT 52 03/27/2019       CBC RESULTS:  Lab Results   Component Value Date    WBC 5.1 09/20/2018    WBC 4.4 02/17/2018    RBC 5.20 09/20/2018    RBC " 4.44 02/17/2018    HGB 16.6 04/11/2019    HCT 48.6 09/20/2018    MCV 93.5 09/20/2018    MCH 30.8 09/20/2018    MCHC 32.9 (A) 09/20/2018    RDW 14.6 02/17/2018     09/20/2018       BMP RESULTS:  Lab Results   Component Value Date     05/24/2019    POTASSIUM 4.5 05/24/2019    CHLORIDE 103 05/24/2019    CO2 30 05/24/2019    ANIONGAP 4 05/24/2019    GLC 95 05/24/2019    BUN 36 (H) 05/24/2019    CR 1.95 (H) 05/24/2019    GFRESTIMATED 33 (L) 05/24/2019    GFRESTBLACK 38 (L) 05/24/2019    TAYLOR 9.1 05/24/2019        A1C RESULTS:  Lab Results   Component Value Date    A1C 6.0 03/31/2016       INR RESULTS:  Lab Results   Component Value Date    INR 1.16 (H) 11/17/2017    INR 1.80 (H) 03/21/2016           CC  Kevan Bull MD  [address] [place] [address]                    Service Date: 06/17/2019      REASON FOR VISIT:  Tricuspid regurgitation.      HISTORY OF PRESENTING ILLNESS:  Please see my note dated 05/24/2019 for details.  To summarize, I had seen the patient first after he transferred care from Dr. Castro, who left practice, to me in May of this year.  The patient has a complex set of medical problems.  He was diagnosed with atrial flutter in 2015, and at that time he has had new onset cardiomyopathy with EF being 35%-40%.  He was seen by Dr. 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  Since then, he continues to follow up with Dr. Larkin.  Subsequently, his EF improved from 40% up to 55%, at that time had mild RV dysfunction that got worse over the following month with worsening tricuspid regurgitation.  It appears around the 2411-1736 time, he was evaluated at [facility] as well and says that Dr. Mays had  seen him again, I do not have records of that, but that he was noted to have severe tricuspid regurgitation and it was recommended to have surgery but was quoted a fairly high risk, he says, and as such, he declined.  Unfortunately, around 2017 he had worsening LV dysfunction and recurrence of atrial arrhythmias, but this time it was atrial fibrillation or not flutter.  He was cardioverted and since then has been in normal rhythm.  Due to his worsening LV dysfunction in 2017, he also underwent a right and left heart catheterization which showed nonobstructive coronary artery disease but elevated filling pressures with a right atrial mean pressure of 14 with V waves up to 20.  His cardiac output was severely diminished.  No significant elevation in PA pressures.  Findings were consistent with significant tricuspid regurgitation.  He has been on chronic anticoagulation for his atrial arrhythmias and chronic suppression of atrial fibrillation and PVCs from amiodarone.  His last Zio Patch monitor was in 04/2019 which has shown 6% burden.  He has also had some thyroid issues related to amiodarone use and now he is on thyroid supplementation.  The patient's last echocardiogram was in 04/2019, which showed an EF of 35%-40% with moderately decreased right ventricular systolic function and moderate to severe dilatation of the RV in the setting of severe TR and there was moderate MR.      After all of this, Mr. Noss was seen by me for the first time.  We decided to do a transesophageal echo to better study his mitral regurgitation, which was done last week, which indeed shows moderate mitral regurgitation and moderate to severe, likely severe tricuspid regurgitation 3 to 4+.  RV is clearly dilated.  Annular dilatation is noted with central tricuspid regurgitation with some mild coaptation.  Importantly, the patient has New York Heart Association functional class II symptoms and clinically is not having signs of severe right  heart failure.  He is able to do whatever he wants.  He is here for followup after the KP and after meeting with Dr. Kidd today.      PHYSICAL EXAMINATION:   VITAL SIGNS:  Blood pressure is 143/88 with a pulse of 67.   GENERAL:  Alert and oriented x3, in no acute distress.   LUNGS:  Clear to auscultation bilaterally.   CARDIAC:  Sounds are regular.  There is a significant systolic murmur in the axilla and right parasternal border.   EXTREMITIES:  Warm without edema.      ASSESSMENT AND PLAN:  I had a detailed conversation with Mr. Fischer today about his tricuspid regurgitation.  Unfortunately, I do not have a clearcut answer at this time, but given the fact that his right ventricle is dilating to this point and the fact that his creatinine has been slowly creeping up over the years, my guess is that he is at a time where his tricuspid valve should be intervened upon.  Dr. Kidd called me after he saw him a few hours ago in clinic today and said that he agrees that tricuspid valve surgery would be undertaken and he was recommending a right mini thoracotomy approach.  However, when Mr. Fischer saw me in clinic today, he was quite skeptical about proceeding with surgery, especially given the fact that he clinically feels well.  I told him I am not 100% certain as to which way his valve is going to behave over the years to come.  There is a possibility that we may end up with dealing with severe tricuspid regurgitation with RV that gets extremely dilated and reaches a point where it is not fixable either due to worsening dysfunction or because of advancing age.  As such, the timing of tricuspid valve surgery in his case is debatable.  He says that [facility] had offered him surgery but he had declined many years ago.  At this point, he wants to hold off given the fact that he feels okay, but he wants to take second opinion with somebody that I would recommend.  I think, given his hesitancy about surgery, he should be  evaluated by Dr. Freedman at [facility] for consideration of tricuspid valve trials.  He would also want to meet one of the Heart Failure specialists to answer the question if anything even needs to be done about the tricuspid valve.  I think it will be best if he takes another appointment and meets with Dr. Gavin Mueller and Dr. Freedman at [facility], and depending on what he decides I will be happy to see him again in consultation based on what his wishes are.  Otherwise, if he decides not to do anything, then he is stable on his current doses of diuretics and I will see him 6 monthly with echocardiograms and renal function assessment.  Of course, if he starts developing any evidence of clinical heart failure, then that would be 1 reason to twist his arm and move forward even further.         VIDHYA SNOW MD             D: 2019   T: 2019   MT: SENA      Name:     TREY BURGER   MRN:      [id number]        Account:      [id number]   :      1945           Service Date: 2019      Document: [id number]       Thank you for allowing me to participate in the care of your patient.      Sincerely,     Vidhya Snow MD     [facility]    cc:   Kevan Bull MD  [address] [place] [address]

## 2019-06-17 NOTE — PROGRESS NOTES
Service Date: 06/17/2019      REASON FOR VISIT:  Tricuspid regurgitation.      HISTORY OF PRESENTING ILLNESS:  Please see my note dated 05/24/2019 for details.  To summarize, I had seen the patient first after he transferred care from Dr. Castro, who left practice, to me in May of this year.  The patient has a complex set of medical problems.  He was diagnosed with atrial flutter in 2015, and at that time he has had new onset cardiomyopathy with EF being 35%-40%.  He was seen by Dr. 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  Since then, he continues to follow up with Dr. Larkin.  Subsequently, his EF improved from 40% up to 55%, at that time had mild RV dysfunction that got worse over the following month with worsening tricuspid regurgitation.  It appears around the 5908-7770 time, he was evaluated at [facility] as well and says that Dr. Mays had seen him again, I do not have records of that, but that he was noted to have severe tricuspid regurgitation and it was recommended to have surgery but was quoted a fairly high risk, he says, and as such, he declined.  Unfortunately, around 2017 he had worsening LV dysfunction and recurrence of atrial arrhythmias, but this time it was atrial fibrillation or not flutter.  He was cardioverted and since then has been in normal rhythm.  Due to his worsening LV dysfunction in 2017, he also underwent a right and left heart catheterization which showed nonobstructive coronary artery disease but elevated filling pressures with a right atrial mean pressure of 14 with V waves up to 20.  His cardiac output was severely diminished.  No significant elevation in PA pressures.  Findings were consistent with  significant tricuspid regurgitation.  He has been on chronic anticoagulation for his atrial arrhythmias and chronic suppression of atrial fibrillation and PVCs from amiodarone.  His last Zio Patch monitor was in 04/2019 which has shown 6% burden.  He has also had some thyroid issues related to amiodarone use and now he is on thyroid supplementation.  The patient's last echocardiogram was in 04/2019, which showed an EF of 35%-40% with moderately decreased right ventricular systolic function and moderate to severe dilatation of the RV in the setting of severe TR and there was moderate MR.      After all of this, Mr. Fischer was seen by me for the first time.  We decided to do a transesophageal echo to better study his mitral regurgitation, which was done last week, which indeed shows moderate mitral regurgitation and moderate to severe, likely severe tricuspid regurgitation 3 to 4+.  RV is clearly dilated.  Annular dilatation is noted with central tricuspid regurgitation with some mild coaptation.  Importantly, the patient has New York Heart Association functional class II symptoms and clinically is not having signs of severe right heart failure.  He is able to do whatever he wants.  He is here for followup after the KP and after meeting with Dr. Kidd today.      PHYSICAL EXAMINATION:   VITAL SIGNS:  Blood pressure is 143/88 with a pulse of 67.   GENERAL:  Alert and oriented x3, in no acute distress.   LUNGS:  Clear to auscultation bilaterally.   CARDIAC:  Sounds are regular.  There is a significant systolic murmur in the axilla and right parasternal border.   EXTREMITIES:  Warm without edema.      ASSESSMENT AND PLAN:  I had a detailed conversation with Mr. Fischer today about his tricuspid regurgitation.  Unfortunately, I do not have a clearcut answer at this time, but given the fact that his right ventricle is dilating to this point and the fact that his creatinine has been slowly creeping up over the years, my guess  is that he is at a time where his tricuspid valve should be intervened upon.  Dr. Kidd called me after he saw him a few hours ago in clinic today and said that he agrees that tricuspid valve surgery would be undertaken and he was recommending a right mini thoracotomy approach.  However, when Mr. Fischer saw me in clinic today, he was quite skeptical about proceeding with surgery, especially given the fact that he clinically feels well.  I told him I am not 100% certain as to which way his valve is going to behave over the years to come.  There is a possibility that we may end up with dealing with severe tricuspid regurgitation with RV that gets extremely dilated and reaches a point where it is not fixable either due to worsening dysfunction or because of advancing age.  As such, the timing of tricuspid valve surgery in his case is debatable.  He says that [facility] had offered him surgery but he had declined many years ago.  At this point, he wants to hold off given the fact that he feels okay, but he wants to take second opinion with somebody that I would recommend.  I think, given his hesitancy about surgery, he should be evaluated by Dr. Freedman at [facility] for consideration of tricuspid valve trials.  He would also want to meet one of the Heart Failure specialists to answer the question if anything even needs to be done about the tricuspid valve.  I think it will be best if he takes another appointment and meets with Dr. Gavin Mueller and Dr. Freedman at [facility], and depending on what he decides I will be happy to see him again in consultation based on what his wishes are.  Otherwise, if he decides not to do anything, then he is stable on his current doses of diuretics and I will see him 6 monthly with echocardiograms and renal function assessment.  Of course, if he starts developing any evidence of clinical heart failure, then that would be 1 reason to twist his arm and move forward even further.          KEYSHA REA MD             D: 2019   T: 2019   MT: SENA      Name:     TREY BURGER   MRN:      [id number]        Account:      [id number]   :      1945           Service Date: 2019      Document: [id number]

## 2019-06-17 NOTE — LETTER
6/17/2019      Kevan Bull MD  [address]  [facility] [address]      RE: Misael Fischer       Dear Colleague,    I had the pleasure of seeing Misael Fischer in the [facility].    Service Date: 06/17/2019      REASON FOR VISIT:  Tricuspid regurgitation.      HISTORY OF PRESENTING ILLNESS:  Please see my note dated 05/24/2019 for details.  To summarize, I had seen the patient first after he transferred care from Dr. Castro, who left practice, to me in May of this year.  The patient has a complex set of medical problems.  He was diagnosed with atrial flutter in 2015, and at that time he has had new onset cardiomyopathy with EF being 35%-40%.  He was seen by Dr. 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  Since then, he continues to follow up with Dr. Larkin.  Subsequently, his EF improved from 40% up to 55%, at that time had mild RV dysfunction that got worse over the following month with worsening tricuspid regurgitation.  It appears around the 3461-1506 time, he was evaluated at [facility] as well and says that Dr. Mays had seen him again, I do not have records of that, but that he was noted to have severe tricuspid regurgitation and it was recommended to have surgery but was quoted a fairly high risk, he says, and as such, he declined.  Unfortunately, around 2017 he had worsening LV dysfunction and recurrence of atrial arrhythmias, but this time it was atrial fibrillation or not flutter.  He was cardioverted and since then has been in normal rhythm.  Due to his worsening LV dysfunction in 2017, he also underwent a right and left heart catheterization which showed nonobstructive coronary artery  disease but elevated filling pressures with a right atrial mean pressure of 14 with V waves up to 20.  His cardiac output was severely diminished.  No significant elevation in PA pressures.  Findings were consistent with significant tricuspid regurgitation.  He has been on chronic anticoagulation for his atrial arrhythmias and chronic suppression of atrial fibrillation and PVCs from amiodarone.  His last Zio Patch monitor was in 04/2019 which has shown 6% burden.  He has also had some thyroid issues related to amiodarone use and now he is on thyroid supplementation.  The patient's last echocardiogram was in 04/2019, which showed an EF of 35%-40% with moderately decreased right ventricular systolic function and moderate to severe dilatation of the RV in the setting of severe TR and there was moderate MR.      After all of this, Mr. Fischer was seen by me for the first time.  We decided to do a transesophageal echo to better study his mitral regurgitation, which was done last week, which indeed shows moderate mitral regurgitation and moderate to severe, likely severe tricuspid regurgitation 3 to 4+.  RV is clearly dilated.  Annular dilatation is noted with central tricuspid regurgitation with some mild coaptation.  Importantly, the patient has New York Heart Association functional class II symptoms and clinically is not having signs of severe right heart failure.  He is able to do whatever he wants.  He is here for followup after the KP and after meeting with Dr. Kidd today.      PHYSICAL EXAMINATION:   VITAL SIGNS:  Blood pressure is 143/88 with a pulse of 67.   GENERAL:  Alert and oriented x3, in no acute distress.   LUNGS:  Clear to auscultation bilaterally.   CARDIAC:  Sounds are regular.  There is a significant systolic murmur in the axilla and right parasternal border.   EXTREMITIES:  Warm without edema.      ASSESSMENT AND PLAN:  I had a detailed conversation with Mr. Fischer today about his tricuspid  regurgitation.  Unfortunately, I do not have a clearcut answer at this time, but given the fact that his right ventricle is dilating to this point and the fact that his creatinine has been slowly creeping up over the years, my guess is that he is at a time where his tricuspid valve should be intervened upon.  Dr. Kidd called me after he saw him a few hours ago in clinic today and said that he agrees that tricuspid valve surgery would be undertaken and he was recommending a right mini thoracotomy approach.  However, when Mr. Fischer saw me in clinic today, he was quite skeptical about proceeding with surgery, especially given the fact that he clinically feels well.  I told him I am not 100% certain as to which way his valve is going to behave over the years to come.  There is a possibility that we may end up with dealing with severe tricuspid regurgitation with RV that gets extremely dilated and reaches a point where it is not fixable either due to worsening dysfunction or because of advancing age.  As such, the timing of tricuspid valve surgery in his case is debatable.  He says that [facility] had offered him surgery but he had declined many years ago.  At this point, he wants to hold off given the fact that he feels okay, but he wants to take second opinion with somebody that I would recommend.  I think, given his hesitancy about surgery, he should be evaluated by Dr. Freedman at [facility] for consideration of tricuspid valve trials.  He would also want to meet one of the Heart Failure specialists to answer the question if anything even needs to be done about the tricuspid valve.  I think it will be best if he takes another appointment and meets with Dr. Gavin Mueller and Dr. Freedman at [facility], and depending on what he decides I will be happy to see him again in consultation based on what his wishes are.  Otherwise, if he decides not to do anything, then he is stable on his current doses of diuretics  and I will see him 6 monthly with echocardiograms and renal function assessment.  Of course, if he starts developing any evidence of clinical heart failure, then that would be 1 reason to twist his arm and move forward even further.         KEYSHA SNOW MD             D: 2019   T: 2019   MT: SENA      Name:     TREY BURGER   MRN:      [id number]        Account:      [id number]   :      1945           Service Date: 2019      Document: [id number]         Outpatient Encounter Medications as of 2019   Medication Sig Dispense Refill     Acetaminophen (TYLENOL PO) Take 500 mg by mouth every morning       amiodarone (PACERONE/CODARONE) 200 MG tablet Take 1 tablet (200 mg) by mouth daily 90 tablet 3     apixaban ANTICOAGULANT (ELIQUIS) 5 MG tablet Take 1 tablet (5 mg) by mouth 2 times daily 180 tablet 3     isosorbide mononitrate (IMDUR) 30 MG 24 hr tablet Take 1 tablet (30 mg) by mouth daily 90 tablet 3     levothyroxine (SYNTHROID/LEVOTHROID) 100 MCG tablet Take 88 mcg by mouth 88mcg on all days; 44mcg on        metoprolol succinate ER (TOPROL-XL) 25 MG 24 hr tablet Take 0.5 tablets (12.5 mg) by mouth daily 30 tablet 3     torsemide (DEMADEX) 20 MG tablet Take 1 tablet (20 mg) by mouth daily As directed by the [facility] 90 tablet 3     VITAMIN D, CHOLECALCIFEROL, PO Take 1,000 Units by mouth daily        No facility-administered encounter medications on file as of 2019.        Again, thank you for allowing me to participate in the care of your patient.      Sincerely,    Keysha Snow MD     [facility]

## 2019-06-17 NOTE — LETTER
6/17/2019      RE: Misael Fischer  [address] [facility] [id number]       Dear Colleague,    Thank you for the opportunity to participate in the care of your patient, Misael Fischer, at the [facility] CARDIOTH[facility]ACIC at [facility]. Please see a copy of my visit note below.    Service Date: 06/17/2019      REFERRING CARDIOLOGIST:  Vidhya Snow MD       REASON FOR CONSULTATION:  Evaluation for severe tricuspid valve regurgitation.      HISTORY OF PRESENT ILLNESS:  Mr. Fischer is a very pleasant 73-year-old gentleman with extensive cardiac history including biventricular heart failure and known tricuspid and mitral valve regurgitation.  He was found to have severe tricuspid regurgitation for quite some time which he was being followed in the Cardiology Clinic, initially by Dr. Castro, but more recently by Dr. Snow.  His LVEF is known to be around 40% with moderate mitral valve regurgitation.  A recent echocardiogram demonstrated his known severe tricuspid valve regurgitation.  However, his RV function has begun to diminish more and his right ventricle has now dilated to the moderate to severe range.  For these reasons, the patient was referred to me for surgical evaluation for possible tricuspid valve surgery.        Of note, the patient was seen by Dr. Jose Mays at the [facility] in 2017 for severe tricuspid valve regurgitation in the setting of worsening RV dysfunction.  Dr. Mays recommended tricuspid valve surgery at that time, but apparently the patient declined due to the quoted high risk of surgery.      PAST MEDICAL HISTORY:  Atrial flutter, BPH, coronary artery disease, cardiomyopathy, EF of 40%, chronic renal insufficiency, hyperlipidemia, history of malaria (the patient has lived in [place] throughout his adult life) and tricuspid valve regurgitation.      PAST SURGICAL HISTORY:  Left foot injury repair in 1957, cardiac ablation for atrial flutter and cardioversion.       FAMILY HISTORY:  Noncontributory except for coronary artery disease.      SOCIAL HISTORY:  He is .  He is retired, but does a lot of volunteer work in [place].  He has never smoked.  He drinks alcohol very rarely.      ALLERGIES:  INCLUDE ACE INHIBITORS AND PRAVASTATIN.      CURRENT MEDICATIONS:  Reviewed in Epic chart.  He is on:   1.  Torsemide.   2.  Metoprolol.   3.  Synthroid.   4.  Eliquis.   5.  Amiodarone.      REVIEW OF SYSTEMS:  As per HPI.  All other 10-point review of systems are completed and were otherwise negative unless stated above.  The patient reports that he is much more easily fatigued than compared to a year ago and does have some shortness of breath when he really pushes himself and he has a stable but a recurrent lower extremity edema which appears to be fairly well controlled on torsemide.      PHYSICAL EXAMINATION:   VITAL SIGNS:  Blood pressure is 118/76, pulse is 64.  He is 66 kg, 5 feet 8 inches.   GENERAL:  He appears well, in no acute distress.   HEENT:  Within normal limits.   NECK:  Supple, no lymphadenopathy.   CARDIOVASCULAR:  Regular rate and rhythm.  Normal S1, S2.  Grade 3/6 systolic murmur at the apex.   LUNGS:  Clear bilaterally.   ABDOMEN:  Soft, nontender, nondistended.   EXTREMITIES:  Positive for 1+ bilateral lower extremity edema.      LABORATORY DATA:  His most recent KP from 06/11/2019 demonstrates LVEF of 40%.  RV with moderate to severe dilatation and mildly reduced systolic function, moderate MR, severe tricuspid valve regurgitation that appears to be functional with a central jet due to annular dilatation, positive bubble study with bidirectional shunt, moderately dilated right atrium, severely dilated right atrium.  Aortic valve is trileaflet with no aortic stenosis or regurgitation.  His most recent creatinine level from 05/24/2019 is 1.95.      IMPRESSION AND PLAN:  Mr. Fischer is a very pleasant 73-year-old gentleman with known severe tricuspid valve  regurgitation for quite some time with slowly worsening RV dilatation and RV dysfunction.  He is mildly symptomatic and does have lower extremity edema that is being managed with diuretics.  He has chronic renal insufficiency with a baseline creatinine of around 2.  I discussed this patient's case with Dr. Snow extensively.  My recommendation is a tricuspid valve repair/replacement due to the severity of his tricuspid valve regurgitation with a slowly but definitely deteriorating RV function, which is now in the moderate range with severe RV dilatation as well.  I would not recommend intervening on his mitral valve, which is moderately regurgitant but stable.  He would still need a left heart catheterization and right heart catheterization to complete his preoperative workup and also a noncontrast chest, abdomen and pelvis CT scan to look at his aorta and iliofemoral anatomy to see whether he would be a good minimally invasive tricuspid valve surgery candidate via a right mini thoracotomy this approach and femoral arterial and venous cannulation.        I explained to the patient the reason for recommending tricuspid valve repair versus replacement, its risks and benefits and the potential complications associated with the surgery.  These complications include but are not strictly limited to infection, bleeding, stroke, postop MI, postop arrhythmias, pulmonary and renal complications.  I think overall he is a good surgical candidate, although he would be at a moderate surgical risk candidate and I quoted an operative mortality risk of around 5%.  The patient understood, but is quite hesitant to proceed right away that since he has a lot of summer plans with traveling, etc.  I told him my concern of postponing the surgery, especially given the fact that his RV function has significantly deteriorated, that my recommendation will be to have a tricuspid valve surgery sooner than later.  He seems to understand.  He  agreed to proceed with a right and left heart catheterization and a CT scan which I will follow up with the results.      It was a pleasure to meet Mr. Burger today, and thank you very much for this referral.  Given the high-risk nature of this case, my plan will be to do his operation at the [facility] when the patient ready for surgery.         TONY HUNTER MD             D: 2019   T: 2019   MT: JOYCE      Name:     TREY BURGER   MRN:      [id number]        Account:      [id number]   :      1945           Service Date: 2019      Document: [id number]

## 2019-06-18 ENCOUNTER — CARE COORDINATION (OUTPATIENT)
Dept: CARDIOLOGY | Facility: CLINIC | Age: 74
End: 2019-06-18

## 2019-06-18 NOTE — PROGRESS NOTES
Service Date: 06/17/2019      REFERRING CARDIOLOGIST:  Vidhya Snow MD       REASON FOR CONSULTATION:  Evaluation for severe tricuspid valve regurgitation.      HISTORY OF PRESENT ILLNESS:  Mr. Fischer is a very pleasant 73-year-old gentleman with extensive cardiac history including biventricular heart failure and known tricuspid and mitral valve regurgitation.  He was found to have severe tricuspid regurgitation for quite some time which he was being followed in the Cardiology Clinic, initially by Dr. Castro, but more recently by Dr. Snow.  His LVEF is known to be around 40% with moderate mitral valve regurgitation.  A recent echocardiogram demonstrated his known severe tricuspid valve regurgitation.  However, his RV function has begun to diminish more and his right ventricle has now dilated to the moderate to severe range.  For these reasons, the patient was referred to me for surgical evaluation for possible tricuspid valve surgery.        Of note, the patient was seen by Dr. Jose Mays at the [facility] in 2017 for severe tricuspid valve regurgitation in the setting of worsening RV dysfunction.  Dr. Mays recommended tricuspid valve surgery at that time, but apparently the patient declined due to the quoted high risk of surgery.      PAST MEDICAL HISTORY:  Atrial flutter, BPH, coronary artery disease, cardiomyopathy, EF of 40%, chronic renal insufficiency, hyperlipidemia, history of malaria (the patient has lived in [place] throughout his adult life) and tricuspid valve regurgitation.      PAST SURGICAL HISTORY:  Left foot injury repair in 1957, cardiac ablation for atrial flutter and cardioversion.      FAMILY HISTORY:  Noncontributory except for coronary artery disease.      SOCIAL HISTORY:  He is .  He is retired, but does a lot of volunteer work in [place].  He has never smoked.  He drinks alcohol very rarely.      ALLERGIES:  INCLUDE ACE INHIBITORS AND PRAVASTATIN.      CURRENT MEDICATIONS:  Reviewed  in Epic chart.  He is on:   1.  Torsemide.   2.  Metoprolol.   3.  Synthroid.   4.  Eliquis.   5.  Amiodarone.      REVIEW OF SYSTEMS:  As per HPI.  All other 10-point review of systems are completed and were otherwise negative unless stated above.  The patient reports that he is much more easily fatigued than compared to a year ago and does have some shortness of breath when he really pushes himself and he has a stable but a recurrent lower extremity edema which appears to be fairly well controlled on torsemide.      PHYSICAL EXAMINATION:   VITAL SIGNS:  Blood pressure is 118/76, pulse is 64.  He is 66 kg, 5 feet 8 inches.   GENERAL:  He appears well, in no acute distress.   HEENT:  Within normal limits.   NECK:  Supple, no lymphadenopathy.   CARDIOVASCULAR:  Regular rate and rhythm.  Normal S1, S2.  Grade 3/6 systolic murmur at the apex.   LUNGS:  Clear bilaterally.   ABDOMEN:  Soft, nontender, nondistended.   EXTREMITIES:  Positive for 1+ bilateral lower extremity edema.      LABORATORY DATA:  His most recent KP from 06/11/2019 demonstrates LVEF of 40%.  RV with moderate to severe dilatation and mildly reduced systolic function, moderate MR, severe tricuspid valve regurgitation that appears to be functional with a central jet due to annular dilatation, positive bubble study with bidirectional shunt, moderately dilated right atrium, severely dilated right atrium.  Aortic valve is trileaflet with no aortic stenosis or regurgitation.  His most recent creatinine level from 05/24/2019 is 1.95.      IMPRESSION AND PLAN:  Mr. Fischer is a very pleasant 73-year-old gentleman with known severe tricuspid valve regurgitation for quite some time with slowly worsening RV dilatation and RV dysfunction.  He is mildly symptomatic and does have lower extremity edema that is being managed with diuretics.  He has chronic renal insufficiency with a baseline creatinine of around 2.  I discussed this patient's case with Dr. Snow  extensively.  My recommendation is a tricuspid valve repair/replacement due to the severity of his tricuspid valve regurgitation with a slowly but definitely deteriorating RV function, which is now in the moderate range with severe RV dilatation as well.  I would not recommend intervening on his mitral valve, which is moderately regurgitant but stable.  He would still need a left heart catheterization and right heart catheterization to complete his preoperative workup and also a noncontrast chest, abdomen and pelvis CT scan to look at his aorta and iliofemoral anatomy to see whether he would be a good minimally invasive tricuspid valve surgery candidate via a right mini thoracotomy this approach and femoral arterial and venous cannulation.        I explained to the patient the reason for recommending tricuspid valve repair versus replacement, its risks and benefits and the potential complications associated with the surgery.  These complications include but are not strictly limited to infection, bleeding, stroke, postop MI, postop arrhythmias, pulmonary and renal complications.  I think overall he is a good surgical candidate, although he would be at a moderate surgical risk candidate and I quoted an operative mortality risk of around 5%.  The patient understood, but is quite hesitant to proceed right away that since he has a lot of summer plans with traveling, etc.  I told him my concern of postponing the surgery, especially given the fact that his RV function has significantly deteriorated, that my recommendation will be to have a tricuspid valve surgery sooner than later.  He seems to understand.  He agreed to proceed with a right and left heart catheterization and a CT scan which I will follow up with the results.      It was a pleasure to meet Mr. Fischer today, and thank you very much for this referral.  Given the high-risk nature of this case, my plan will be to do his operation at the [facility] when the patient ready for surgery.         TONY HUNTER MD             D: 2019   T: 2019   MT: JOYCE      Name:     TREY BURGER   MRN:      [id number]        Account:      [id number]   :      1945           Service Date: 2019      Document: [id number]

## 2019-06-18 NOTE — PROGRESS NOTES
would like for pt to be referred to  and  at [facility] to discuss treatment of his right sided heart failure and severe tricuspid valve regurgitation. I left message with heart failure clinic at [facility] to find out best way to get pt scheduled, and what information they need from our clinic. Jeffrey HORTON June 18, 2019, 11:05 AM

## 2019-06-21 NOTE — PROGRESS NOTES
Pt called back and stated that he has not heard from the Dr. Freedman or Dr. Mueller's teams at [facility] yet. Informed pt we would reach out to them again. Anna Shafer RN on 6/21/2019 at 2:55 PM

## 2019-06-21 NOTE — PROGRESS NOTES
I left message for pt to call back to find out if I has reached out to him to schedule OV with  and . I left VM at [facility] earlier this week, but have not heard back. Jeffrey HORTON June 21, 2019, 2:24 PM

## 2019-06-25 DIAGNOSIS — I50.22 CHRONIC SYSTOLIC CONGESTIVE HEART FAILURE (H): Primary | ICD-10-CM

## 2019-06-25 NOTE — PROGRESS NOTES
I called I and spoke to both 's team (advanced heart failure)and 's team (tricuspid valve specialist). I scheduled pt with  for 9/17/19 @ 2:00 (labs and EKG), and  @ 3:00. They recommended pt check with his insurance to make sure they are covered. Valentina was the  I spoke to regarding 's OV ([phone]).    I was then transferred to Abi, who works with . She said they can likely get pt in with  in August, but need to review his records first. They will contact pt once they receive all of his images. They requested:  1. Echo from 4/3/19  2. KP from 6/11/19  3. Cath from 12/4/17  4. MRI 7/14/16    They can review all of the other records in [facility], but need the images to be pushed through. I messaged an update to our HIM dept. I updated , and I also called pt with an update. Jeffrey HORTON June 25, 2019, 2:46 PM

## 2019-06-26 ENCOUNTER — TELEPHONE (OUTPATIENT)
Dept: CARDIOLOGY | Facility: CLINIC | Age: 74
End: 2019-06-26

## 2019-06-26 DIAGNOSIS — I50.22 CHRONIC SYSTOLIC CONGESTIVE HEART FAILURE (H): ICD-10-CM

## 2019-06-26 LAB
ANION GAP SERPL CALCULATED.3IONS-SCNC: 7 MMOL/L (ref 3–14)
BUN SERPL-MCNC: 43 MG/DL (ref 7–30)
CALCIUM SERPL-MCNC: 8.7 MG/DL (ref 8.5–10.1)
CHLORIDE SERPL-SCNC: 103 MMOL/L (ref 94–109)
CO2 SERPL-SCNC: 26 MMOL/L (ref 20–32)
CREAT SERPL-MCNC: 2.34 MG/DL (ref 0.66–1.25)
GFR SERPL CREATININE-BSD FRML MDRD: 26 ML/MIN/{1.73_M2}
GLUCOSE SERPL-MCNC: 94 MG/DL (ref 70–99)
POTASSIUM SERPL-SCNC: 4.5 MMOL/L (ref 3.4–5.3)
SODIUM SERPL-SCNC: 136 MMOL/L (ref 133–144)

## 2019-06-26 PROCEDURE — 80048 BASIC METABOLIC PNL TOTAL CA: CPT | Performed by: PHYSICIAN ASSISTANT

## 2019-06-26 PROCEDURE — 36415 COLL VENOUS BLD VENIPUNCTURE: CPT | Performed by: INTERNAL MEDICINE

## 2019-06-26 NOTE — TELEPHONE ENCOUNTER
Left voicemail requesting patient bring all current Rx bottles into appt 6/27/19 w/WYATT Sorensen LPN  [facility]  [phone]

## 2019-06-27 ENCOUNTER — OFFICE VISIT (OUTPATIENT)
Dept: CARDIOLOGY | Facility: CLINIC | Age: 74
End: 2019-06-27
Attending: INTERNAL MEDICINE
Payer: COMMERCIAL

## 2019-06-27 VITALS
RESPIRATION RATE: 18 BRPM | SYSTOLIC BLOOD PRESSURE: 98 MMHG | WEIGHT: 144.7 LBS | BODY MASS INDEX: 21.93 KG/M2 | HEIGHT: 68 IN | HEART RATE: 50 BPM | OXYGEN SATURATION: 95 % | DIASTOLIC BLOOD PRESSURE: 60 MMHG

## 2019-06-27 DIAGNOSIS — I48.19 PERSISTENT ATRIAL FIBRILLATION (H): ICD-10-CM

## 2019-06-27 DIAGNOSIS — I50.22 CHRONIC SYSTOLIC HEART FAILURE (H): ICD-10-CM

## 2019-06-27 DIAGNOSIS — I36.1 NON-RHEUMATIC TRICUSPID VALVE INSUFFICIENCY: ICD-10-CM

## 2019-06-27 PROCEDURE — 99214 OFFICE O/P EST MOD 30 MIN: CPT | Performed by: PHYSICIAN ASSISTANT

## 2019-06-27 RX ORDER — TORSEMIDE 20 MG/1
TABLET ORAL
Qty: 90 TABLET | Refills: 3
Start: 2019-06-27

## 2019-06-27 ASSESSMENT — PAIN SCALES - GENERAL: PAINLEVEL: NO PAIN (0)

## 2019-06-27 ASSESSMENT — MIFFLIN-ST. JEOR: SCORE: 1365.85

## 2019-06-27 NOTE — PROGRESS NOTES
Cardiology Progress Note    Date of Service: 06/27/2019      Reason for visit: [facility] clinic follow up, nonischemic cardiomyopathy, severe tricuspid regurgitation.    Primary cardiologist: Dr. Snow ([facility]), Dr. 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  He has known significant tricuspid regurgitation with RV annular dilatation, for which he was previously evaluated at [facility] though declined surgical intervention due to surgical risk. In addition, he has a cardiomyopathy that was initially felt secondary to his arrhythmias, as his EF at one point had near normalized with control of his rhythm. In 2017 he had a right and left heart catheterization performed showing trivial nonobstructive coronary disease, and PA pressures were normal with a mean of 19mmHg. His PCWP was elevated at 20mmHg, and his CI via Kirill (due to severe TR) was severely reduced at 1.9LPM. There was also question of a small right to left shunt and at least moderate mitral insufficiency.    He has had issues with recurrent afib with prior cardioversion and now on amiodarone (complicated by thyroid dysfunction, which is now controlled), and is followed by Dr. Larkin of EP. 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  Optimization of his cardiomyopathy regimen has been challenging, as he has been intolerant of beta blockers due to fatigue, and inability to use ACE-I/ARB due to his CKD. Over the last few years he had done quite well with the need for minimal diuretics, until this spring. When he saw Dr. Larkin the end of March, he noted increased pedal edema. Echo showed that his EF remained 35-40%, with continued moderately decreased RV function. His TR was reported as 4+, which was felt to be worse than previously. He  also has ongoing moderate 2+ MR. Mr Fischer was then referred to the [facility] clinic for continued management.    I met him in early April 2019. At our first visit he told me he had been adjusting his torsemide between 5mg and 15mg daily based on his swelling; he had been trying to maintain on lower doses when possible due to his kidney dysfunction. Interestingly, he tells me he doesn't require his diuretics at all when he is [place] work, and remains quite active when he goes there, with minimal symptoms. I have since slowly increased his torsemide, now up to 20mg daily, and added Imdur for afterload reduction.    I then referred him to Dr. Snow for further evaluation of his valvular dysfunction. He then performed a KP which confirmed moderate MR and likely severe tricuspid regurgitation. Annual dilation was noted with central TR and mild coaptation. At that visit, he asked that he retrial metoprolol at 12.5mg daily. Also, tricuspid valve surgery was recommended; he saw Dr. Kidd last week, who also suggested surgery. 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 He has requested a second opinion, and is currently working on arranging appts at [facility] with Dr. Freedman and Dr. Kessler. Today he is here for our routine planned [facility] clinic follow up.    Over the last few weeks he tells me he hasn't been feeling as well. He notes that his BP and heart rates have been running lower lately at home. Systolic pressure is nearly always less than 100 (here today as well, he is 98/60). His heart rate today is 50, and at home runs 50 to low 60s. He notes less energy than usual and gets somewhat dizzy when he first stands. From a swelling standpoint, he thinks he's doing well. Home weight ranges between 137-143 at home on 20mg daily of torsemide. He takes an occasional extra 5mg of  torsemide if he notes some abdominal bloating, which is where he retains his fluid first. Labs done yesterday show a mild decline in his renal function, with BUN/Scr up to 43/2.34 respectively. Fortunately, he continues to deny significant GANT or orthopnea.       ASSESSMENT/PLAN:    1. Nonischemic cardiomyopathy.   --Last echo (via KP) June 2019 with EF 40%, with moderately to severely dilated RV and moderately reduced function. TR 3-4+. He also has 2+MR. Angiogram in 2017 showed only trival coronary disease; at that time, [facility] showed no significant pulmonary hypertension with elevated filling pressures.   --His swelling is improved on higher doses of torsemide, but his renal function has declined slightly. Will reduce his torsemide to 10mg daily and he can take the extra 5mg on days he notes more edema or weight gain. 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 I've asked him to continue to be aware of sodium in his diet.    --He is feeling worse back on the beta blockers with fatigue, and mild hypotension and bradycardia. Will hold this once again. For now, continue the isosorbide without change.    2. Severe tricuspid regurgitation.    --Tricuspid valve surgery has been recommended by Dr. Kidd. Mr. Fischer admits he remains a bit hesitant to proceed given that he has done well functionally overall. A second opinion has been requested. Dr. Snow has recommended he see Dr. Freedman and Dr. Kessler at [facility]. He is working on arranging these appts in the near future.        3. Atrial fibrillation/flutter.   --Following with EP; he is on amiodarone (complicated by thyroid dysfunction, which is now controlled). 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 Most recent Ziopatch in April 2019 showed no afib, with PVC burden of 6%.    --Will touch base with  Dr. Larkin to see if we may be able to reduce his amio dose to allow for low dose beta blockers. I will contact Mr. Fischer if there are any further recommendations.   --Continue Eliquis, which he is tolerating well.     Addendum: 6/28: After input from Dr. Larkin and Dr. Snow: ok to reduce amio to 100mg daily. I called and informed patient of this change.  I will have our office call him in ~1 week and get an update. If able to see a drift up in HR/BP slightly, in a few weeks will consider reintroducing low dose beta blocker.       Follow up plan:  Return to see myself in [facility] in 2-3 weeks with labs prior to his return to [place] work.         Orders this Visit:  Orders Placed This Encounter   Procedures     Basic metabolic panel     N terminal pro BNP outpatient     Follow-Up with [facility] - [facility]ED visit     Orders Placed This Encounter   Medications     torsemide (DEMADEX) 20 MG tablet     Sig: Take 10mg (half tab) once a day. Take an extra 5mg on days you note swelling or increase in weight as directed by [facility] clinci.     Dispense:  90 tablet     Refill:  3     Medications Discontinued During This Encounter   Medication Reason     metoprolol succinate ER (TOPROL-XL) 25 MG 24 hr tablet Stop at Discharge     torsemide (DEMADEX) 20 MG tablet            CURRENT MEDICATIONS:  Current Outpatient Medications   Medication Sig Dispense Refill     Acetaminophen (TYLENOL PO) Take 500 mg by mouth every morning       amiodarone (PACERONE/CODARONE) 200 MG tablet Take 1 tablet (200 mg) by mouth daily 90 tablet 3     apixaban ANTICOAGULANT (ELIQUIS) 5 MG tablet Take 1 tablet (5 mg) by mouth 2 times daily 180 tablet 3     isosorbide mononitrate (IMDUR) 30 MG 24 hr tablet Take 1 tablet (30 mg) by mouth daily 90 tablet 3     levothyroxine (SYNTHROID/LEVOTHROID) 100 MCG tablet Take 88 mcg by mouth 88mcg on all days; 44mcg on Fridays       torsemide (DEMADEX) 20 MG tablet Take 10mg (half tab) once a day. Take an extra 5mg  on days you note swelling or increase in weight as directed by [facility] clinci. 90 tablet 3     VITAMIN D, CHOLECALCIFEROL, PO Take 1,000 Units by mouth daily          ALLERGIES     Allergies   Allergen Reactions     Ace Inhibitors      Due to CKD, renal insufficiency     Pravastatin      Muscle aches       PAST MEDICAL HISTORY:  Past Medical History:   Diagnosis Date     Atrial flutter (H) 11/23/15     AVNRT (AV jade re-entry tachycardia) (H)     status post     BPH (benign prostatic hyperplasia)      CAD (coronary artery disease)     Abnormal Nuc 2014     Cardiomyopathy (H)     Echo 11/2015- EF 35-40%     CHF (congestive heart failure) (H) 11/25/2015    Echo 11/2015- EF 35-40%      CKD (chronic kidney disease)     not on ACE/ARB due to insufficiency     Elevated PSA      Encounter for cardioversion procedure 09/25/2018    1 shock with 120 joules successful      Hypercholesterolemia 7/10/2014     Malaria      Mild pulmonic regurgitation and RV dysfunction by prior echocardiogram      Non-rheumatic tricuspid valve insufficiency      Paroxysmal A-fib (H)      Persistent atrial fibrillation with rapid ventricular response (H)      Pigment deposition      Systolic heart failure (H)     Echo 11/2015- EF 35-40%     Tricuspid regurgitation     Mod 2016       PAST SURGICAL HISTORY:  Past Surgical History:   Procedure Laterality Date     ANESTHESIA CARDIOVERSION N/A 12/6/2016    Procedure: ANESTHESIA CARDIOVERSION;  Surgeon: GENERIC ANESTHESIA PROVIDER;  Location:  OR     ANESTHESIA CARDIOVERSION N/A 6/4/2018    Procedure: ANESTHESIA CARDIOVERSION;  ANESTHESIA CARDIOVERSION ;  Surgeon: GENERIC ANESTHESIA PROVIDER;  Location:  OR     ANESTHESIA CARDIOVERSION N/A 9/25/2018    Procedure: ANESTHESIA CARDIOVERSION;  ANESTHESIA CARDIOVERSION ;  Surgeon: GENERIC ANESTHESIA PROVIDER;  Location:  OR     C KP (TRANSESOPHAGEAL ECHO)  12/6/16     CARDIOVERSION  12/14/15     H ABLATION ATRIAL FLUTTER  3/21/16     ORTHOPEDIC SURGERY  Left 1957    foot injury repair       FAMILY HISTORY:  Family History   Problem Relation Age of Onset     C.A.D. Mother      Prostate Cancer Father 73       SOCIAL HISTORY:  Social History     Socioeconomic History     Marital status:      Spouse name: None     Number of children: None     Years of education: None     Highest education level: None   Occupational History     None   Social Needs     Financial resource strain: None     Food insecurity:     Worry: None     Inability: None     Transportation needs:     Medical: None     Non-medical: None   Tobacco Use     Smoking status: Never Smoker     Smokeless tobacco: Never Used   Substance and Sexual Activity     Alcohol use: Yes     Comment: rare     Drug use: No     Sexual activity: Not Currently   Lifestyle     Physical activity:     Days per week: None     Minutes per session: None     Stress: None   Relationships     Social connections:     Talks on phone: None     Gets together: None     Attends Cheondoism service: None     Active member of club or organization: None     Attends meetings of clubs or organizations: None     Relationship status: None     Intimate partner violence:     Fear of current or ex partner: None     Emotionally abused: None     Physically abused: None     Forced sexual activity: None   Other Topics Concern     Parent/sibling w/ CABG, MI or angioplasty before 65F 55M? Not Asked      Service Not Asked     Blood Transfusions Not Asked     Caffeine Concern No     Occupational Exposure No     Comment: none     Hobby Hazards Not Asked     Sleep Concern No     Stress Concern No     Weight Concern No     Special Diet Yes     Comment: low salt     Back Care Not Asked     Exercise Yes     Comment: walking     Bike Helmet Not Asked     Seat Belt Yes     Self-Exams Not Asked   Social History Narrative     None       Review of Systems:  Cardiovascular: negative for chest pain, palpitations, pos LE edema, stable.  Constitutional:  "negative for chills, sweats, fevers. Pos fatigue.  Resp: Negative for dyspnea at rest, dyspnea on exertion, cough, known chronic lung disease  HEENT: Negative for new visual changes, frequent headaches  Gastrointestinal: negative for abdominal pain, pos occasional abd bloating. Neg diarrhea, nausea, vomiting  Hematologic/lymphatic: pos for current systemic anticoagulation, neg hx of blood clots  Musculoskeletal: negative for new back pain, pos knee pain.  Neurological: negative for focal weakness, LOC, seizures, syncope/presyncope. Pos dizziness when first stands.      Physical Exam:  Vitals: BP 98/60 (BP Location: Right arm, Patient Position: Sitting, Cuff Size: Adult Regular)   Pulse 50   Resp 18   Ht 1.727 m (5' 8\")   Wt 65.6 kg (144 lb 11.2 oz)   SpO2 95%   BMI 22.00 kg/m     Wt Readings from Last 4 Encounters:   06/27/19 65.6 kg (144 lb 11.2 oz)   06/17/19 64.9 kg (143 lb)   06/17/19 65.7 kg (144 lb 12.8 oz)   06/11/19 63.6 kg (140 lb 5 oz)       GEN:  In general, this is a well nourished  male in no acute distress on room air.  Patient ambulatory, unaccompanied today.  HEENT:  Pupils equal, round. Sclerae nonicteric.   NECK: Supple, no masses appreciated. Trachea midline, no obvious JVD.     C/V:  Appeared regular on exam again today. 2/6 MILDRED, heard best at RSB. No rub or gallop. No S3 or RV heave.   RESP: Respirations are unlabored. No use of accessory muscles. Clear to auscultation bilaterally without wheezing, rales, or rhonchi.  GI: Abdomen soft, nontender, nondistended. No HSM appreciated.   EXTREM: Trace bilateral pitting ankle edema.  No cyanosis or clubbing.  NEURO: Alert and oriented, cooperative. Gait not assessed. No obvious focal deficits.   PSYCH: Normal affect.  SKIN: Warm and dry. No rashes or petechiae appreciated.       Recent Lab Results:    BMP RESULTS:  Lab Results   Component Value Date     06/26/2019    POTASSIUM 4.5 06/26/2019    CHLORIDE 103 06/26/2019    CO2 26 " 06/26/2019    ANIONGAP 7 06/26/2019    GLC 94 06/26/2019    BUN 43 (H) 06/26/2019    CR 2.34 (H) 06/26/2019    GFRESTIMATED 26 (L) 06/26/2019    GFRESTBLACK 30 (L) 06/26/2019    TAYLOR 8.7 06/26/2019          Additional pertinent testing:  KP 6/11/19  Interpretation Summary     The visual ejection fraction is estimated at 40%.  The right ventricle is moderate to severely dilated. Moderately decreased  right ventricular systolic function  There is moderate (2+) mitral regurgitation.  There is mod-severe to severe (3-4+) tricuspid regurgitation. TR is central  due to RV annular dilatation. See 3D TV images, which were challenging,  towards the end of the study. GE probe was unavailable.  No thrombus is detected in the left atrial appendage.  A contrast injection (Bubble Study) was performed that was positive early  after the injection. Atrial bi-directional shunt on bubble and color.    Diana Zarate PA-C  [facility]  Pager [phone]

## 2019-06-27 NOTE — PROGRESS NOTES
Ideally would be good to have some BB on board given LV dysfunction and PVCs. See what Dr. Larkin says.

## 2019-06-27 NOTE — PATIENT INSTRUCTIONS
Call [facility] nurse for any questions or concerns Mon-Fri 8am-4pm:                                                  [phone]                                       For concerns after hours:                                                 [phone]     Medication changes:   1. STOP the metoprolol.  For now continue your amiodarone without change. I will contact you after I speak with Dr. Larkin about any changes.  2. DECREASE your torsemide to 10mg daily. You can take an extra 5mg as we discussed if more swelling or weight gain.     Plan from today: Return to see Diana in 2-3 weeks with labs.

## 2019-06-28 RX ORDER — AMIODARONE HYDROCHLORIDE 200 MG/1
100 TABLET ORAL DAILY
Qty: 90 TABLET | Refills: 3
Start: 2019-06-28 | End: 2019-07-17

## 2019-07-01 ENCOUNTER — CARE COORDINATION (OUTPATIENT)
Dept: CARDIOLOGY | Facility: CLINIC | Age: 74
End: 2019-07-01

## 2019-07-01 NOTE — PROGRESS NOTES
Diana Zarate PA Gerdowsky, Christina, RN   Caller: Unspecified (Today,  9:35 AM)             Thanks for the update.   As I mentioned to him, may take some time for the amio to wash out of his system.   He should stay off the metoprolol for now. If the dizziness worsens abruptly or he has presyncope/syncope, he should possibly be evaluated in the ED.   Thanks   Diana

## 2019-07-01 NOTE — PROGRESS NOTES
"Diana Zarate PA Gerdowsky, Christina, CLIFFORD   6/28/19          Can you call him in about a week and get a symptom update along with BP and HR at home?   I reduced his amio to see if his BP/HR would drift up, and allow us to retry a low dose BB.     Thanks   Diana      Pt seen in [facility] [place] On 6/27 and medication changes:   1. STOP the metoprolol.  For now continue your amiodarone without change. I will contact you after I speak with Dr. Larkin about any changes.  2. DECREASE your torsemide to 10mg daily. You can take an extra 5mg as we discussed if more swelling or weight gain.   Plan from today: Return to see Diana in 2-3 weeks with labs. 7/12    Dr Larkin reduce amio to 100 mg daily  On 6/27; wt was 143 lbs, varying from 137-143 lbs at home. BP was 98/60 & HR 50    Spoke to pt. Pt reports he is still \"very tired, slept most of the day yesterday.\". Reports BPs low 100s/60s and HR 60s. Confirmed he stopped metoprolol and decreased amio to 100 mg daily.   Pt states wt is 140 lbs today. Wt did go up to 144 lbs on fri He is taking torsemide 15 mg daily as he still feels he has \"maybe some\" abd bloating. He did state he now has a cold that started on Fri/sat and has a hard time determining if his fatigue is from the cold or \"the meds\".  He admits that he has had poor PO intake this wkend, having had to make himself drink and eat this wkend. Again, not sure if it is due to his cold. Pt reports drinking 4-5 8 oz glasses on sat.   Will update Diana.   Helen Peters, CLIFFORD 10:01 AM 07/01/19    "

## 2019-07-11 ENCOUNTER — TELEPHONE (OUTPATIENT)
Dept: CARDIOLOGY | Facility: CLINIC | Age: 74
End: 2019-07-11

## 2019-07-11 DIAGNOSIS — I50.22 CHRONIC SYSTOLIC HEART FAILURE (H): ICD-10-CM

## 2019-07-11 DIAGNOSIS — I36.1 NON-RHEUMATIC TRICUSPID VALVE INSUFFICIENCY: ICD-10-CM

## 2019-07-11 LAB
ANION GAP SERPL CALCULATED.3IONS-SCNC: 8 MMOL/L (ref 3–14)
BUN SERPL-MCNC: 26 MG/DL (ref 7–30)
CALCIUM SERPL-MCNC: 8.9 MG/DL (ref 8.5–10.1)
CHLORIDE SERPL-SCNC: 103 MMOL/L (ref 94–109)
CO2 SERPL-SCNC: 25 MMOL/L (ref 20–32)
CREAT SERPL-MCNC: 1.63 MG/DL (ref 0.66–1.25)
GFR SERPL CREATININE-BSD FRML MDRD: 40 ML/MIN/{1.73_M2}
GLUCOSE SERPL-MCNC: 98 MG/DL (ref 70–99)
NT-PROBNP SERPL-MCNC: 3617 PG/ML (ref 0–450)
POTASSIUM SERPL-SCNC: 4.6 MMOL/L (ref 3.4–5.3)
SODIUM SERPL-SCNC: 136 MMOL/L (ref 133–144)

## 2019-07-11 PROCEDURE — 83880 ASSAY OF NATRIURETIC PEPTIDE: CPT | Performed by: PHYSICIAN ASSISTANT

## 2019-07-11 PROCEDURE — 80048 BASIC METABOLIC PNL TOTAL CA: CPT | Performed by: PHYSICIAN ASSISTANT

## 2019-07-11 PROCEDURE — 36415 COLL VENOUS BLD VENIPUNCTURE: CPT | Performed by: INTERNAL MEDICINE

## 2019-07-11 NOTE — TELEPHONE ENCOUNTER
Called patient to request he bring Rx bottles to appt 7/12/19 w/WOODY Sorensen. Patient's line was busy, unable to leave voicemail.     Reyes Espino LPN  [facility]  [phone]

## 2019-07-12 ENCOUNTER — OFFICE VISIT (OUTPATIENT)
Dept: CARDIOLOGY | Facility: CLINIC | Age: 74
End: 2019-07-12
Attending: PHYSICIAN ASSISTANT
Payer: COMMERCIAL

## 2019-07-12 ENCOUNTER — CARE COORDINATION (OUTPATIENT)
Dept: CARDIOLOGY | Facility: CLINIC | Age: 74
End: 2019-07-12

## 2019-07-12 VITALS
OXYGEN SATURATION: 96 % | BODY MASS INDEX: 21.88 KG/M2 | WEIGHT: 143.9 LBS | RESPIRATION RATE: 20 BRPM | DIASTOLIC BLOOD PRESSURE: 78 MMHG | SYSTOLIC BLOOD PRESSURE: 102 MMHG | HEART RATE: 66 BPM

## 2019-07-12 DIAGNOSIS — I48.19 PERSISTENT ATRIAL FIBRILLATION (H): ICD-10-CM

## 2019-07-12 DIAGNOSIS — I36.1 NON-RHEUMATIC TRICUSPID VALVE INSUFFICIENCY: ICD-10-CM

## 2019-07-12 DIAGNOSIS — I50.22 CHRONIC SYSTOLIC HEART FAILURE (H): ICD-10-CM

## 2019-07-12 PROCEDURE — 99214 OFFICE O/P EST MOD 30 MIN: CPT | Performed by: PHYSICIAN ASSISTANT

## 2019-07-12 ASSESSMENT — PAIN SCALES - GENERAL: PAINLEVEL: NO PAIN (0)

## 2019-07-12 NOTE — PROGRESS NOTES
Cardiology Progress Note    Date of Service: 07/12/2019      Reason for visit: [facility] clinic follow up, nonischemic cardiomyopathy, severe tricuspid regurgitation.    Primary cardiologist: Dr. Snow ([facility]), Dr. 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  He also has longstanding significant tricuspid regurgitation with RV annular dilatation, for which he was previously evaluated at [facility]; he had declined surgical intervention due to surgical risk. He has a  cardiomyopathy that was initially felt secondary to his arrhythmias, as his EF at one point had near normalized with control of his rhythm. In 2017 he had a right and left heart catheterization performed showing trivial nonobstructive coronary disease, and PA pressures were normal with a mean of 19mmHg. His PCWP was elevated at 20mmHg, and his CI via Kirill (due to severe TR) was severely reduced at 1.9LPM. There was also question of a small right to left shunt and at least moderate mitral insufficiency.    He has had issues with recurrent afib with prior cardioversion and now on amiodarone (complicated by thyroid dysfunction, which is controlled), and is followed by Dr. Larkin of EP. 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  Optimization of his cardiomyopathy regimen has been challenging, as he has been intolerant of beta blockers due to fatigue, and inability to use ACE-I/ARB due to his CKD. Over the last few years he had done quite well with the need for minimal diuretics, until this spring. When he saw Dr. Larkin the end of March, he noted increased pedal edema. Echo showed that his EF remained impaired at 35-40%, with continued moderately decreased RV function. His TR was reported as 4+, which was felt to be worse  than previously. He continues with 2+ MR.     I first met Mr. Fischer in April 2019 when he was referred to the [facility] clinic for continued management. At our first visit he told me he had been adjusting his torsemide between 5mg and 15mg daily based on his swelling; he had been trying to maintain on lower doses when possible due to his kidney dysfunction. 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 Over the last few visits I have since slowly increased his torsemide, and added Imdur for afterload reduction.     He then established with Dr. Snow for [facility] and ongoing evaluation of his valvular dysfunction. KP confirmed moderate MR and likely severe tricuspid regurgitation. Annual dilation was noted with central TR and mild coaptation. At that visit, he asked that he retrial metoprolol at 12.5mg daily. Also, tricuspid valve surgery was recommended; and he saw Dr. Kidd shortly after, who also suggested surgery. 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 He has requested a second opinion, and is currently working on arranging appts at [facility] with Dr. Freedman and Dr. Kessler.     At our last visit in late June, he told me he hadn't been feeling as well lately with lower BP and heart rates at home, more fatigue, and dizziness. Volume wise he was doing well with stable weights, though his renal function had declined slightly. After that visit we made a few changes; his amio was reduced to 100mg daily, we held the metoprolol once again, and I asked him to take the Imdur in the evening before bed. In addition, we reduced his torsemide to 10mg daily with an additional 5mg PRN. He's back today in follow up of these changes.    Today Mr. Fischer tells me he is  "feeling \"great.\" He has no further dizziness, and more energy. He has no ankle edema, and his renal function is improved via labs, with a GFR of 40, the best it's been in awhile. NT-proBNP remains elevated, though stable from previous. His heart rates are now in the mid 60s for the most part (previously down in 50s and occasionally low 40s on his home checks), and systolic BP running 110-120s. He denies GANT, or orthopnea. Overall he is pleased with how he's been feeling; he has another trip to [place] and possibly [place] coming up in the next few weeks and is hoping he will continue to feel as well as he does currently.       ASSESSMENT/PLAN:    1. Nonischemic cardiomyopathy.   --Last echo (via KP) June 2019 with EF 40%, moderately to severely dilated RV and moderately reduced function. TR 3-4+. He also has 2+MR. Angiogram in 2017 showed only trival coronary disease; at that time, R[facility] showed no significant pulmonary hypertension with elevated filling pressures.   --Today he is doing very well, and is euvolemic on exam. Will continue torsemide at 10mg daily with an additional 5mg when he notes swelling. 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 I've asked him to continue to be aware of sodium in his diet.    --Continue the isosorbide without change. He is not on ACE-I/ARB due to CRI.   --He has now failed a trial of beta blockers x 2 with fatigue and mild hypotension. I made no changes today as he's feeling well, but now that he's on a lower amio dose as below, perhaps we could try again in the future. Will d/w Dr. Snow.      2. Severe tricuspid regurgitation.    --Tricuspid valve surgery has been recommended by Dr. Kidd. Mr. Fischer admits he remains a bit hesitant to proceed given that he has done well functionally overall. A second opinion has been requested and he is awaiting appt with Dr. Freedman and Dr. Kessler at [facility]. "        3. Atrial fibrillation/flutter.   --Following with EP; he is on amiodarone (complicated by thyroid dysfunction). 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 Most recent Ziopatch in April 2019 showed no afib, with PVC burden of 6%.    --Continue amio at 100mg daily; he is feeling better with this reduction and thus far appears to have good rate/rhythm control.   --Continue Eliquis, which he is tolerating well.       Follow up plan:  Return to see myself in [facility] [place] in ~6 weeks with labs after he returns from his mission trip.          Orders this Visit:  Orders Placed This Encounter   Procedures     Basic metabolic panel     Follow-Up with [facility] - MAGED visit     Orders Placed This Encounter   Medications     DISCONTD: apixaban ANTICOAGULANT (ELIQUIS) 5 MG tablet     Sig: Take 1 tablet (5 mg) by mouth 2 times daily     Dispense:  180 tablet     Refill:  3     apixaban ANTICOAGULANT (ELIQUIS) 5 MG tablet     Sig: Take 1 tablet (5 mg) by mouth 2 times daily     Dispense:  180 tablet     Refill:  3     Medications Discontinued During This Encounter   Medication Reason     apixaban ANTICOAGULANT (ELIQUIS) 5 MG tablet Reorder     apixaban ANTICOAGULANT (ELIQUIS) 5 MG tablet Reorder         CURRENT MEDICATIONS:  Current Outpatient Medications   Medication Sig Dispense Refill     Acetaminophen (TYLENOL PO) Take 500 mg by mouth every morning       amiodarone (PACERONE/CODARONE) 200 MG tablet Take 0.5 tablets (100 mg) by mouth daily 90 tablet 3     apixaban ANTICOAGULANT (ELIQUIS) 5 MG tablet Take 1 tablet (5 mg) by mouth 2 times daily 180 tablet 3     isosorbide mononitrate (IMDUR) 30 MG 24 hr tablet Take 1 tablet (30 mg) by mouth daily 90 tablet 3     levothyroxine (SYNTHROID/LEVOTHROID) 100 MCG tablet Take 88 mcg by mouth 88mcg on all days; 44mcg on Fridays       torsemide (DEMADEX) 20 MG tablet Take 10mg (half tab) once a day. Take an  extra 5mg on days you note swelling or increase in weight as directed by [facility] clinci. 90 tablet 3     VITAMIN D, CHOLECALCIFEROL, PO Take 1,000 Units by mouth daily          ALLERGIES     Allergies   Allergen Reactions     Ace Inhibitors      Due to CKD, renal insufficiency     Pravastatin      Muscle aches       PAST MEDICAL HISTORY:  Past Medical History:   Diagnosis Date     Atrial flutter (H) 11/23/15     AVNRT (AV jade re-entry tachycardia) (H)     status post     BPH (benign prostatic hyperplasia)      CAD (coronary artery disease)     Abnormal Nuc 2014     Cardiomyopathy (H)     Echo 11/2015- EF 35-40%     CHF (congestive heart failure) (H) 11/25/2015    Echo 11/2015- EF 35-40%      CKD (chronic kidney disease)     not on ACE/ARB due to insufficiency     Elevated PSA      Encounter for cardioversion procedure 09/25/2018    1 shock with 120 joules successful      Hypercholesterolemia 7/10/2014     Malaria      Mild pulmonic regurgitation and RV dysfunction by prior echocardiogram      Non-rheumatic tricuspid valve insufficiency      Paroxysmal A-fib (H)      Persistent atrial fibrillation with rapid ventricular response (H)      Pigment deposition      Systolic heart failure (H)     Echo 11/2015- EF 35-40%     Tricuspid regurgitation     Mod 2016       PAST SURGICAL HISTORY:  Past Surgical History:   Procedure Laterality Date     ANESTHESIA CARDIOVERSION N/A 12/6/2016    Procedure: ANESTHESIA CARDIOVERSION;  Surgeon: GENERIC ANESTHESIA PROVIDER;  Location:  OR     ANESTHESIA CARDIOVERSION N/A 6/4/2018    Procedure: ANESTHESIA CARDIOVERSION;  ANESTHESIA CARDIOVERSION ;  Surgeon: GENERIC ANESTHESIA PROVIDER;  Location:  OR     ANESTHESIA CARDIOVERSION N/A 9/25/2018    Procedure: ANESTHESIA CARDIOVERSION;  ANESTHESIA CARDIOVERSION ;  Surgeon: GENERIC ANESTHESIA PROVIDER;  Location:  OR      KP (TRANSESOPHAGEAL ECHO)  12/6/16     CARDIOVERSION  12/14/15     H ABLATION ATRIAL FLUTTER  3/21/16      ORTHOPEDIC SURGERY Left 1957    foot injury repair       FAMILY HISTORY:  Family History   Problem Relation Age of Onset     C.A.D. Mother      Prostate Cancer Father 73       SOCIAL HISTORY:  Social History     Socioeconomic History     Marital status:      Spouse name: Not on file     Number of children: Not on file     Years of education: Not on file     Highest education level: Not on file   Occupational History     Not on file   Social Needs     Financial resource strain: Not on file     Food insecurity:     Worry: Not on file     Inability: Not on file     Transportation needs:     Medical: Not on file     Non-medical: Not on file   Tobacco Use     Smoking status: Never Smoker     Smokeless tobacco: Never Used   Substance and Sexual Activity     Alcohol use: Yes     Comment: rare     Drug use: No     Sexual activity: Not Currently   Lifestyle     Physical activity:     Days per week: Not on file     Minutes per session: Not on file     Stress: Not on file   Relationships     Social connections:     Talks on phone: Not on file     Gets together: Not on file     Attends Worship service: Not on file     Active member of club or organization: Not on file     Attends meetings of clubs or organizations: Not on file     Relationship status: Not on file     Intimate partner violence:     Fear of current or ex partner: Not on file     Emotionally abused: Not on file     Physically abused: Not on file     Forced sexual activity: Not on file   Other Topics Concern     Parent/sibling w/ CABG, MI or angioplasty before 65F 55M? Not Asked      Service Not Asked     Blood Transfusions Not Asked     Caffeine Concern No     Occupational Exposure No     Comment: none     Hobby Hazards Not Asked     Sleep Concern No     Stress Concern No     Weight Concern No     Special Diet Yes     Comment: low salt     Back Care Not Asked     Exercise Yes     Comment: walking     Bike Helmet Not Asked     Seat Belt Yes      Self-Exams Not Asked   Social History Narrative     Not on file       Review of Systems:  Cardiovascular: negative for chest pain, palpitations, neg LE edema.   Constitutional: negative for chills, sweats, fevers, fatigue.   Resp: Negative for dyspnea at rest, dyspnea on exertion, cough, known chronic lung disease  HEENT: Negative for new visual changes, frequent headaches  Gastrointestinal: negative for abdominal pain, bloating. Neg diarrhea, nausea, vomiting  Hematologic/lymphatic: pos for current systemic anticoagulation, neg hx of blood clots  Musculoskeletal: negative for new back pain, pos knee pain, chronic.   Neurological: negative for focal weakness, LOC, seizures, syncope/presyncope/dizziness.      Physical Exam:  Vitals: /78 (BP Location: Right arm, Patient Position: Sitting, Cuff Size: Adult Regular)   Pulse 66   Resp 20   Wt 65.3 kg (143 lb 14.4 oz)   SpO2 96%   BMI 21.88 kg/m     Wt Readings from Last 4 Encounters:   07/12/19 65.3 kg (143 lb 14.4 oz)   06/27/19 65.6 kg (144 lb 11.2 oz)   06/17/19 64.9 kg (143 lb)   06/17/19 65.7 kg (144 lb 12.8 oz)       GEN:  In general, this is a well nourished  male in no acute distress on room air.  Patient ambulatory, unaccompanied today.  HEENT:  Pupils equal, round. Sclerae nonicteric.   NECK: Supple, no masses appreciated. Trachea midline, no JVD.     C/V:  Appeared regular on exam again today. 2/6 MILDRED, heard best at RSB. No rub or gallop. No S3 or RV heave.   RESP: Respirations are unlabored. No use of accessory muscles. Clear to auscultation bilaterally without wheezing, rales, or rhonchi.  GI: Abdomen soft, nontender, nondistended. No HSM appreciated.   EXTREM: No LE edema today. No cyanosis or clubbing.  NEURO: Alert and oriented, cooperative. Gait not formally assessed. No obvious focal deficits.   PSYCH: Normal affect.  SKIN: Warm and dry. No rashes or petechiae appreciated.       Recent Lab Results:    BMP RESULTS:  Lab Results    Component Value Date     07/11/2019    POTASSIUM 4.6 07/11/2019    CHLORIDE 103 07/11/2019    CO2 25 07/11/2019    ANIONGAP 8 07/11/2019    GLC 98 07/11/2019    BUN 26 07/11/2019    CR 1.63 (H) 07/11/2019    GFRESTIMATED 40 (L) 07/11/2019    GFRESTBLACK 47 (L) 07/11/2019    TAYLOR 8.9 07/11/2019      Results for TREY BURGER (MRN [id number]) as of 7/12/2019 08:04   Ref. Range 4/11/2019 08:43 4/17/2019 09:43 7/11/2019 09:55   N-Terminal Pro Bnp Latest Ref Range: 0 - 450 pg/mL 5,449 (H) 3,557 (H) 3,617 (H)       Additional pertinent testing:  KP 6/11/19  Interpretation Summary     The visual ejection fraction is estimated at 40%.  The right ventricle is moderate to severely dilated. Moderately decreased  right ventricular systolic function  There is moderate (2+) mitral regurgitation.  There is mod-severe to severe (3-4+) tricuspid regurgitation. TR is central  due to RV annular dilatation. See 3D TV images, which were challenging,  towards the end of the study. GE probe was unavailable.  No thrombus is detected in the left atrial appendage.  A contrast injection (Bubble Study) was performed that was positive early  after the injection. Atrial bi-directional shunt on bubble and color.    Diana Zarate PA-C  [facility]  Pager [phone]

## 2019-07-12 NOTE — LETTER
7/12/2019    Kevan Bull MD  [address]  [facility] [address]    RE: Misael Fischer       Dear Colleague,    I had the pleasure of seeing Misael Fischer in the [facility].      Cardiology Progress Note    Date of Service: 07/12/2019      Reason for visit: [facility] clinic follow up, nonischemic cardiomyopathy, severe tricuspid regurgitation.    Primary cardiologist: Dr. Snow ([facility]), Dr. 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  He also has longstanding significant tricuspid regurgitation with RV annular dilatation, for which he was previously evaluated at [facility]; he had declined surgical intervention due to surgical risk. He has a  cardiomyopathy that was initially felt secondary to his arrhythmias, as his EF at one point had near normalized with control of his rhythm. In 2017 he had a right and left heart catheterization performed showing trivial nonobstructive coronary disease, and PA pressures were normal with a mean of 19mmHg. His PCWP was elevated at 20mmHg, and his CI via Kirill (due to severe TR) was severely reduced at 1.9LPM. There was also question of a small right to left shunt and at least moderate mitral insufficiency.    He has had issues with recurrent afib with prior cardioversion and now on amiodarone (complicated by thyroid dysfunction, which is controlled), and is followed by Dr. Larkin of EP. 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  Optimization of his cardiomyopathy regimen has been challenging, as he has been intolerant of beta blockers due to fatigue, and inability to use ACE-I/ARB due to his CKD. Over the last few years he had done quite well with the need for minimal diuretics, until this spring. When he  saw Dr. Larkin the end of March, he noted increased pedal edema. Echo showed that his EF remained impaired at 35-40%, with continued moderately decreased RV function. His TR was reported as 4+, which was felt to be worse than previously. He continues with 2+ MR.     I first met Mr. Fischer in April 2019 when he was referred to the [facility] clinic for continued management. At our first visit he told me he had been adjusting his torsemide between 5mg and 15mg daily based on his swelling; he had been trying to maintain on lower doses when possible due to his kidney dysfunction. 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 Over the last few visits I have since slowly increased his torsemide, and added Imdur for afterload reduction.     He then established with Dr. Snow for [facility] and ongoing evaluation of his valvular dysfunction. KP confirmed moderate MR and likely severe tricuspid regurgitation. Annual dilation was noted with central TR and mild coaptation. At that visit, he asked that he retrial metoprolol at 12.5mg daily. Also, tricuspid valve surgery was recommended; and he saw Dr. Kidd shortly after, who also suggested surgery. 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 He has requested a second opinion, and is currently working on arranging appts at [facility] with Dr. Freedman and Dr. Kessler.     At our last visit in late June, he told me he hadn't been feeling as well lately with lower BP and heart rates at home, more fatigue, and dizziness. Volume wise he was doing well with stable weights, though his renal function had declined slightly. After that visit we made a few changes; his amio was reduced to 100mg daily, we held the metoprolol once again, and I  "asked him to take the Imdur in the evening before bed. In addition, we reduced his torsemide to 10mg daily with an additional 5mg PRN. He's back today in follow up of these changes.    Today Mr. Fischer tells me he is feeling \"great.\" He has no further dizziness, and more energy. He has no ankle edema, and his renal function is improved via labs, with a GFR of 40, the best it's been in awhile. NT-proBNP remains elevated, though stable from previous. His heart rates are now in the mid 60s for the most part (previously down in 50s and occasionally low 40s on his home checks), and systolic BP running 110-120s. He denies GANT, or orthopnea. Overall he is pleased with how he's been feeling; he has another trip to [place] and possibly [place] coming up in the next few weeks and is hoping he will continue to feel as well as he does currently.       ASSESSMENT/PLAN:    1. Nonischemic cardiomyopathy.   --Last echo (via KP) June 2019 with EF 40%, moderately to severely dilated RV and moderately reduced function. TR 3-4+. He also has 2+MR. Angiogram in 2017 showed only trival coronary disease; at that time, RHC showed no significant pulmonary hypertension with elevated filling pressures.   --Today he is doing very well, and is euvolemic on exam. Will continue torsemide at 10mg daily with an additional 5mg when he notes swelling. 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 I've asked him to continue to be aware of sodium in his diet.    --Continue the isosorbide without change. He is not on ACE-I/ARB due to CRI.   --He has now failed a trial of beta blockers x 2 with fatigue and mild hypotension. I made no changes today as he's feeling well, but now that he's on a lower amio dose as below, perhaps we could try again in the future. Will d/w Dr. Snow.      2. Severe tricuspid regurgitation.    --Tricuspid valve surgery has been recommended by " Dr. Kidd. Mr. Fischer admits he remains a bit hesitant to proceed given that he has done well functionally overall. A second opinion has been requested and he is awaiting appt with Dr. Freedman and Dr. Kessler at [facility].        3. Atrial fibrillation/flutter.   --Following with EP; he is on amiodarone (complicated by thyroid dysfunction). 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 Most recent Ziopatch in April 2019 showed no afib, with PVC burden of 6%.    --Continue amio at 100mg daily; he is feeling better with this reduction and thus far appears to have good rate/rhythm control.   --Continue Eliquis, which he is tolerating well.       Follow up plan:  Return to see myself in [facility] in ~6 weeks with labs after he returns from his mission trip.          Orders this Visit:  Orders Placed This Encounter   Procedures     Basic metabolic panel     Follow-Up with [facility] - MAGED visit     Orders Placed This Encounter   Medications     DISCONTD: apixaban ANTICOAGULANT (ELIQUIS) 5 MG tablet     Sig: Take 1 tablet (5 mg) by mouth 2 times daily     Dispense:  180 tablet     Refill:  3     apixaban ANTICOAGULANT (ELIQUIS) 5 MG tablet     Sig: Take 1 tablet (5 mg) by mouth 2 times daily     Dispense:  180 tablet     Refill:  3     Medications Discontinued During This Encounter   Medication Reason     apixaban ANTICOAGULANT (ELIQUIS) 5 MG tablet Reorder     apixaban ANTICOAGULANT (ELIQUIS) 5 MG tablet Reorder         CURRENT MEDICATIONS:  Current Outpatient Medications   Medication Sig Dispense Refill     Acetaminophen (TYLENOL PO) Take 500 mg by mouth every morning       amiodarone (PACERONE/CODARONE) 200 MG tablet Take 0.5 tablets (100 mg) by mouth daily 90 tablet 3     apixaban ANTICOAGULANT (ELIQUIS) 5 MG tablet Take 1 tablet (5 mg) by mouth 2 times daily 180 tablet 3     isosorbide mononitrate (IMDUR) 30 MG 24 hr tablet Take 1 tablet (30 mg) by  mouth daily 90 tablet 3     levothyroxine (SYNTHROID/LEVOTHROID) 100 MCG tablet Take 88 mcg by mouth 88mcg on all days; 44mcg on Fridays       torsemide (DEMADEX) 20 MG tablet Take 10mg (half tab) once a day. Take an extra 5mg on days you note swelling or increase in weight as directed by [facility] clinci. 90 tablet 3     VITAMIN D, CHOLECALCIFEROL, PO Take 1,000 Units by mouth daily          ALLERGIES     Allergies   Allergen Reactions     Ace Inhibitors      Due to CKD, renal insufficiency     Pravastatin      Muscle aches       PAST MEDICAL HISTORY:  Past Medical History:   Diagnosis Date     Atrial flutter (H) 11/23/15     AVNRT (AV jade re-entry tachycardia) (H)     status post     BPH (benign prostatic hyperplasia)      CAD (coronary artery disease)     Abnormal Nuc 2014     Cardiomyopathy (H)     Echo 11/2015- EF 35-40%     CHF (congestive heart failure) (H) 11/25/2015    Echo 11/2015- EF 35-40%      CKD (chronic kidney disease)     not on ACE/ARB due to insufficiency     Elevated PSA      Encounter for cardioversion procedure 09/25/2018    1 shock with 120 joules successful      Hypercholesterolemia 7/10/2014     Malaria      Mild pulmonic regurgitation and RV dysfunction by prior echocardiogram      Non-rheumatic tricuspid valve insufficiency      Paroxysmal A-fib (H)      Persistent atrial fibrillation with rapid ventricular response (H)      Pigment deposition      Systolic heart failure (H)     Echo 11/2015- EF 35-40%     Tricuspid regurgitation     Mod 2016       PAST SURGICAL HISTORY:  Past Surgical History:   Procedure Laterality Date     ANESTHESIA CARDIOVERSION N/A 12/6/2016    Procedure: ANESTHESIA CARDIOVERSION;  Surgeon: GENERIC ANESTHESIA PROVIDER;  Location:  OR     ANESTHESIA CARDIOVERSION N/A 6/4/2018    Procedure: ANESTHESIA CARDIOVERSION;  ANESTHESIA CARDIOVERSION ;  Surgeon: GENERIC ANESTHESIA PROVIDER;  Location:  OR     ANESTHESIA CARDIOVERSION N/A 9/25/2018    Procedure: ANESTHESIA  CARDIOVERSION;  ANESTHESIA CARDIOVERSION ;  Surgeon: GENERIC ANESTHESIA PROVIDER;  Location: [facility] OR     C KP (TRANSESOPHAGEAL ECHO)  12/6/16     CARDIOVERSION  12/14/15     H ABLATION ATRIAL FLUTTER  3/21/16     ORTHOPEDIC SURGERY Left 1957    foot injury repair       FAMILY HISTORY:  Family History   Problem Relation Age of Onset     C.A.D. Mother      Prostate Cancer Father 73       SOCIAL HISTORY:  Social History     Socioeconomic History     Marital status:      Spouse name: Not on file     Number of children: Not on file     Years of education: Not on file     Highest education level: Not on file   Occupational History     Not on file   Social Needs     Financial resource strain: Not on file     Food insecurity:     Worry: Not on file     Inability: Not on file     Transportation needs:     Medical: Not on file     Non-medical: Not on file   Tobacco Use     Smoking status: Never Smoker     Smokeless tobacco: Never Used   Substance and Sexual Activity     Alcohol use: Yes     Comment: rare     Drug use: No     Sexual activity: Not Currently   Lifestyle     Physical activity:     Days per week: Not on file     Minutes per session: Not on file     Stress: Not on file   Relationships     Social connections:     Talks on phone: Not on file     Gets together: Not on file     Attends Gnosticist service: Not on file     Active member of club or organization: Not on file     Attends meetings of clubs or organizations: Not on file     Relationship status: Not on file     Intimate partner violence:     Fear of current or ex partner: Not on file     Emotionally abused: Not on file     Physically abused: Not on file     Forced sexual activity: Not on file   Other Topics Concern     Parent/sibling w/ CABG, MI or angioplasty before 65F 55M? Not Asked      Service Not Asked     Blood Transfusions Not Asked     Caffeine Concern No     Occupational Exposure No     Comment: none     Hobby Hazards Not Asked     Sleep  Concern No     Stress Concern No     Weight Concern No     Special Diet Yes     Comment: low salt     Back Care Not Asked     Exercise Yes     Comment: walking     Bike Helmet Not Asked     Seat Belt Yes     Self-Exams Not Asked   Social History Narrative     Not on file       Review of Systems:  Cardiovascular: negative for chest pain, palpitations, neg LE edema.   Constitutional: negative for chills, sweats, fevers, fatigue.   Resp: Negative for dyspnea at rest, dyspnea on exertion, cough, known chronic lung disease  HEENT: Negative for new visual changes, frequent headaches  Gastrointestinal: negative for abdominal pain, bloating. Neg diarrhea, nausea, vomiting  Hematologic/lymphatic: pos for current systemic anticoagulation, neg hx of blood clots  Musculoskeletal: negative for new back pain, pos knee pain, chronic.   Neurological: negative for focal weakness, LOC, seizures, syncope/presyncope/dizziness.      Physical Exam:  Vitals: /78 (BP Location: Right arm, Patient Position: Sitting, Cuff Size: Adult Regular)   Pulse 66   Resp 20   Wt 65.3 kg (143 lb 14.4 oz)   SpO2 96%   BMI 21.88 kg/m      Wt Readings from Last 4 Encounters:   07/12/19 65.3 kg (143 lb 14.4 oz)   06/27/19 65.6 kg (144 lb 11.2 oz)   06/17/19 64.9 kg (143 lb)   06/17/19 65.7 kg (144 lb 12.8 oz)       GEN:  In general, this is a well nourished  male in no acute distress on room air.  Patient ambulatory, unaccompanied today.  HEENT:  Pupils equal, round. Sclerae nonicteric.   NECK: Supple, no masses appreciated. Trachea midline, no JVD.     C/V:  Appeared regular on exam again today. 2/6 MILDRED, heard best at RSB. No rub or gallop. No S3 or RV heave.   RESP: Respirations are unlabored. No use of accessory muscles. Clear to auscultation bilaterally without wheezing, rales, or rhonchi.  GI: Abdomen soft, nontender, nondistended. No HSM appreciated.   EXTREM: No LE edema today. No cyanosis or clubbing.  NEURO: Alert and oriented,  cooperative. Gait not formally assessed. No obvious focal deficits.   PSYCH: Normal affect.  SKIN: Warm and dry. No rashes or petechiae appreciated.       Recent Lab Results:    BMP RESULTS:  Lab Results   Component Value Date     07/11/2019    POTASSIUM 4.6 07/11/2019    CHLORIDE 103 07/11/2019    CO2 25 07/11/2019    ANIONGAP 8 07/11/2019    GLC 98 07/11/2019    BUN 26 07/11/2019    CR 1.63 (H) 07/11/2019    GFRESTIMATED 40 (L) 07/11/2019    GFRESTBLACK 47 (L) 07/11/2019    TAYLOR 8.9 07/11/2019      Results for TREY BURGER (MRN [id number]) as of 7/12/2019 08:04   Ref. Range 4/11/2019 08:43 4/17/2019 09:43 7/11/2019 09:55   N-Terminal Pro Bnp Latest Ref Range: 0 - 450 pg/mL 5,449 (H) 3,557 (H) 3,617 (H)       Additional pertinent testing:  KP 6/11/19  Interpretation Summary     The visual ejection fraction is estimated at 40%.  The right ventricle is moderate to severely dilated. Moderately decreased  right ventricular systolic function  There is moderate (2+) mitral regurgitation.  There is mod-severe to severe (3-4+) tricuspid regurgitation. TR is central  due to RV annular dilatation. See 3D TV images, which were challenging,  towards the end of the study. GE probe was unavailable.  No thrombus is detected in the left atrial appendage.  A contrast injection (Bubble Study) was performed that was positive early  after the injection. Atrial bi-directional shunt on bubble and color.    Diana Zarate PA-C  [facility]  Pager [phone]      Thank you for allowing me to participate in the care of your patient.    Sincerely,     JESSICA Berg     [facility]

## 2019-07-12 NOTE — PATIENT INSTRUCTIONS
Visit Summary:    Today we discussed:   I'm pleased you're feeling well.     Medication changes:    NONE    Test results:   Results for TREY BURGER (MRN [id number]) as of 7/12/2019 07:47   Ref. Range 7/11/2019 09:55   Sodium Latest Ref Range: 133 - 144 mmol/L 136   Potassium Latest Ref Range: 3.4 - 5.3 mmol/L 4.6   Chloride Latest Ref Range: 94 - 109 mmol/L 103   Carbon Dioxide Latest Ref Range: 20 - 32 mmol/L 25   Urea Nitrogen Latest Ref Range: 7 - 30 mg/dL 26   Creatinine Latest Ref Range: 0.66 - 1.25 mg/dL 1.63 (H)   GFR Estimate Latest Ref Range: >60 mL/min/1.73_m2 40 (L)   GFR Estimate If Black Latest Ref Range: >60 mL/min/1.73_m2 47 (L)       Follow up:   With Diana in [facility] clinic in ~6 weeks with labs.

## 2019-07-12 NOTE — PROGRESS NOTES
[facility]        Medication: Eliquis 5 mg twice daily    Samples Provided: Eliquis 5 mg tablets QTY: 4 weeks     Current Medication List Provided: Yes    Samples handed to Misael during his office visit today    Valencia Johnson RN    Heart Failure Coordinator, [place]  07/12/19 8:26 AM

## 2019-07-17 ENCOUNTER — TELEPHONE (OUTPATIENT)
Dept: CARDIOLOGY | Facility: CLINIC | Age: 74
End: 2019-07-17

## 2019-07-17 DIAGNOSIS — I48.19 PERSISTENT ATRIAL FIBRILLATION (H): ICD-10-CM

## 2019-07-17 RX ORDER — AMIODARONE HYDROCHLORIDE 200 MG/1
100 TABLET ORAL DAILY
Qty: 45 TABLET | Refills: 3 | Status: SHIPPED | OUTPATIENT
Start: 2019-07-17

## 2019-08-20 NOTE — PROGRESS NOTES
Cardiology Progress Note    Date of Service: 08/21/2019      Reason for visit: [facility] clinic follow up, nonischemic cardiomyopathy, severe tricuspid regurgitation.    Primary cardiologist: Dr. Snow ([facility]), Dr. 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  He also has longstanding significant tricuspid regurgitation with RV annular dilatation, for which he was previously evaluated at [facility]. He declined surgical intervention due to surgical risk. He has a cardiomyopathy that was initially felt secondary to his arrhythmias, as his EF at one point had near normalized with control of his rhythm. In 2017 he had a right and left heart catheterization performed showing trivial nonobstructive coronary disease, and PA pressures were normal with a mean of 19mmHg. His PCWP was elevated at 20mmHg, and his CI via Kirill (due to severe TR) was severely reduced at 1.9LPM. There was also question of a small right to left shunt and at least moderate mitral insufficiency.    He has had issues with recurrent afib with prior cardioversion and is on amiodarone (complicated by thyroid dysfunction, which is controlled), and is followed by Dr. Larkin of [facility]. 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  Optimization of his cardiomyopathy regimen has been challenging, as he has been intolerant of beta blockers due to fatigue, and inability to use ACE-I/ARB due to his CKD. Despite this, over the last few years he had done quite well with the need for minimal diuretics, until this spring. When he saw Dr. Larkin the end of March, he noted increased pedal edema. Echo showed that his EF remained impaired at 35-40%, with continued moderately decreased RV function. His TR was reported as 4+, which was felt to be  worse than previously. He continues with 2+ MR.     I first met Mr. Fischer in April 2019 when he was referred to the [facility] clinic for continued management. At our first visit he told me he had been adjusting his torsemide between 5mg and 15mg daily based on his swelling; he had been trying to maintain on lower doses when possible due to his CKD. 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 We added Imdur for afterload reduction. He then established with Dr. Snow for [facility], and a repeat KP in June of 2019 confirmed moderate MR and likely severe tricuspid regurgitation. Annual dilation was noted with central TR and mild coaptation. At that visit, he asked that he retrial metoprolol at 12.5mg daily. Tricuspid valve surgery again was recommended; he saw Dr. Kidd shortly after, though Mr. Fischer remains skeptical about proceeding as he feels clinically he does well. 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 He has requested a second opinion, and is currently working on arranging appts at [facility] with Dr. Freedman and Dr. Kessler.     At our visit in late June, he told me he hadn't been feeling as well with lower BP and heart rates at home, more fatigue, and dizziness. We reduced the amio to 100mg daily, and held the metoprolol once again. In addition, I asked him to take the Imdur in the evening before bed. We reduced his torsemide slightly to 10mg daily with an additional 5mg PRN. This resulted in overall improvement in his symptoms with more energy. His renal function had improved and his dizziness had resolved. At our follow up in July his heart rates were in the mid 60s for the most part (previously down in 50s and occasionally low 40s on his home checks), and systolic BP remained under good control. I made no changes that visit, as he had  "another trip to [place] in the near future.     I'm seeing him back today for our planned 6 week follow up. Since last visit he tells me he continues to feel well. He has again since traveled to [place] and [facility], and says he is not limited by his breathing in any way while there, despite having to be on his feet all day walking and climbing stairs. He did transiently have to increase his torsemide back to 15mg daily after his recent return to the [facility], but now feels he is back to his baseline. He has no new chest discomfort or palpitations, and takes his pulse frequently. It stays reliably in the 60-70s. He has no dizziness or presyncope. His only complaint revolves around some soreness on the lateral aspects of his lower legs which results in feeling \"wobbly\" when he first stands. He has not fallen. Bp today remains under excellent control. His [facility] appts are currently scheduled for mid September, but he admits to me today he may have to push them back due to planned Missionary travels once again.       ASSESSMENT/PLAN:    1. Nonischemic cardiomyopathy.   --Last echo (via KP) June 2019 with EF 40%, moderately to severely dilated RV and moderately reduced function. TR 3-4+. He also has 2+MR. Angiogram in 2017 showed only trivial nonobstructive coronary disease; at that time, RHC showed no significant pulmonary hypertension with elevated filling pressures.   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 He remains euvolemic on exam and renal function is at baseline. Will continue torsemide at 10mg daily with an additional 5mg when he notes swelling. He is traveling again next week. I've asked him to continue to be aware of sodium in his diet.    --Continue the Imdur at 30mg without change, which he takes in the evening before bed. He is not on ACE-I/ARB due to CKD.    --He has failed a trial of low dose beta blockers x 2 with fatigue and mild " hypotension. I made no changes today as he's feeling well.    2. Severe tricuspid regurgitation.    --Tricuspid valve surgery has been recommended by Dr. Kidd. Mr. Fischer remains hesitant to proceed given that he has done well functionally overall. A second opinion has been requested and he is awaiting appt with Dr. Freedman and Dr. Kessler at [facility], hopefully next month.       3. Atrial fibrillation/flutter.   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 Most recent Ziopatch in April 2019 showed no afib, with PVC burden of 6%.    --He is on amiodarone (complicated by thyroid dysfunction), and feels better with recent dose reduction now at 100mg daily. He appears to continue with excellent rate control despite inability to tolerate beta blockers.   --Continue TSH/LFT monitoring while on amio. He is overdue for his annual CXR which he can schedule at his convenience.   --Continue Eliquis, which he is tolerating well.       Follow up plan:  Return to see Dr. Snow in ~2 months after he meets with the team at [facility]. I will continue to follow him in [facility] going forward as well.       Orders this Visit:  Orders Placed This Encounter   Procedures     X-ray Chest 2 vws*     Basic metabolic panel     N terminal pro BNP outpatient     Follow-Up with [facility] - Return MD visit     No orders of the defined types were placed in this encounter.    There are no discontinued medications.      CURRENT MEDICATIONS:  Current Outpatient Medications   Medication Sig Dispense Refill     Acetaminophen (TYLENOL PO) Take 500 mg by mouth every morning       amiodarone (PACERONE/CODARONE) 200 MG tablet Take 0.5 tablets (100 mg) by mouth daily 45 tablet 3     apixaban ANTICOAGULANT (ELIQUIS) 5 MG tablet Take 1 tablet (5 mg) by mouth 2 times daily 180 tablet 3     isosorbide mononitrate (IMDUR) 30 MG 24 hr tablet Take 1 tablet (30 mg) by mouth daily 90 tablet 3      levothyroxine (SYNTHROID/LEVOTHROID) 100 MCG tablet Take 88 mcg by mouth 88mcg on all days; 44mcg on Fridays       torsemide (DEMADEX) 20 MG tablet Take 10mg (half tab) once a day. Take an extra 5mg on days you note swelling or increase in weight as directed by [facility] clinci. 90 tablet 3     VITAMIN D, CHOLECALCIFEROL, PO Take 1,000 Units by mouth daily          ALLERGIES     Allergies   Allergen Reactions     Ace Inhibitors      Due to CKD, renal insufficiency     Pravastatin      Muscle aches       PAST MEDICAL HISTORY:  Past Medical History:   Diagnosis Date     Atrial flutter (H) 11/23/15     AVNRT (AV jade re-entry tachycardia) (H)     status post     BPH (benign prostatic hyperplasia)      CAD (coronary artery disease)     Abnormal Nuc 2014     Cardiomyopathy (H)     Echo 11/2015- EF 35-40%     CHF (congestive heart failure) (H) 11/25/2015    Echo 11/2015- EF 35-40%      CKD (chronic kidney disease)     not on ACE/ARB due to insufficiency     Elevated PSA      Encounter for cardioversion procedure 09/25/2018    1 shock with 120 joules successful      Hypercholesterolemia 7/10/2014     Malaria      Mild pulmonic regurgitation and RV dysfunction by prior echocardiogram      Non-rheumatic tricuspid valve insufficiency      Paroxysmal A-fib (H)      Persistent atrial fibrillation with rapid ventricular response (H)      Pigment deposition      Systolic heart failure (H)     Echo 11/2015- EF 35-40%     Tricuspid regurgitation     Mod 2016       PAST SURGICAL HISTORY:  Past Surgical History:   Procedure Laterality Date     ANESTHESIA CARDIOVERSION N/A 12/6/2016    Procedure: ANESTHESIA CARDIOVERSION;  Surgeon: GENERIC ANESTHESIA PROVIDER;  Location:  OR     ANESTHESIA CARDIOVERSION N/A 6/4/2018    Procedure: ANESTHESIA CARDIOVERSION;  ANESTHESIA CARDIOVERSION ;  Surgeon: GENERIC ANESTHESIA PROVIDER;  Location:  OR     ANESTHESIA CARDIOVERSION N/A 9/25/2018    Procedure: ANESTHESIA CARDIOVERSION;  ANESTHESIA  CARDIOVERSION ;  Surgeon: GENERIC ANESTHESIA PROVIDER;  Location: [facility] OR     C KP (TRANSESOPHAGEAL ECHO)  12/6/16     CARDIOVERSION  12/14/15     H ABLATION ATRIAL FLUTTER  3/21/16     ORTHOPEDIC SURGERY Left 1957    foot injury repair       FAMILY HISTORY:  Family History   Problem Relation Age of Onset     C.A.D. Mother      Prostate Cancer Father 73       SOCIAL HISTORY:  Social History     Socioeconomic History     Marital status:      Spouse name: None     Number of children: None     Years of education: None     Highest education level: None   Occupational History     None   Social Needs     Financial resource strain: None     Food insecurity:     Worry: None     Inability: None     Transportation needs:     Medical: None     Non-medical: None   Tobacco Use     Smoking status: Never Smoker     Smokeless tobacco: Never Used   Substance and Sexual Activity     Alcohol use: Yes     Comment: rare     Drug use: No     Sexual activity: Not Currently   Lifestyle     Physical activity:     Days per week: None     Minutes per session: None     Stress: None   Relationships     Social connections:     Talks on phone: None     Gets together: None     Attends Church service: None     Active member of club or organization: None     Attends meetings of clubs or organizations: None     Relationship status: None     Intimate partner violence:     Fear of current or ex partner: None     Emotionally abused: None     Physically abused: None     Forced sexual activity: None   Other Topics Concern     Parent/sibling w/ CABG, MI or angioplasty before 65F 55M? Not Asked      Service Not Asked     Blood Transfusions Not Asked     Caffeine Concern No     Occupational Exposure No     Comment: none     Hobby Hazards Not Asked     Sleep Concern No     Stress Concern No     Weight Concern No     Special Diet Yes     Comment: low salt     Back Care Not Asked     Exercise Yes     Comment: walking     Bike Helmet Not Asked  "    Seat Belt Yes     Self-Exams Not Asked   Social History Narrative     None       Review of Systems:  Cardiovascular: negative for chest pain, palpitations, pos occasional ankle edema.   Constitutional: negative for chills, sweats, fevers, fatigue.   Resp: Negative for dyspnea at rest, dyspnea on exertion, cough, known chronic lung disease  HEENT: Negative for new visual changes, frequent headaches  Gastrointestinal: negative for abdominal pain, bloating. Neg diarrhea, nausea, vomiting  Hematologic/lymphatic: pos for current systemic anticoagulation, neg hx of blood clots  Musculoskeletal: negative for new back pain, pos intermittent knee and leg pain, chronic.   Neurological: negative for focal weakness, LOC, seizures, syncope/presyncope/dizziness.      Physical Exam:  Vitals: /70 (BP Location: Right arm, Patient Position: Chair, Cuff Size: Adult Small)   Pulse 70   Ht 1.727 m (5' 8\")   Wt 66.5 kg (146 lb 8 oz)   SpO2 93%   BMI 22.28 kg/m     Wt Readings from Last 4 Encounters:   08/21/19 66.5 kg (146 lb 8 oz)   07/12/19 65.3 kg (143 lb 14.4 oz)   06/27/19 65.6 kg (144 lb 11.2 oz)   06/17/19 64.9 kg (143 lb)       GEN:  In general, this is a well nourished  male in no acute distress on room air.  Patient ambulatory, unaccompanied today.  HEENT:  Pupils equal, round. Sclerae nonicteric.   NECK: Supple, no masses appreciated. Trachea midline, no JVD.     C/V:  Appeared regular on exam again today. 2/6 MILDRED, heard best at RSB. No rub or gallop. No S3 or RV heave.   RESP: Respirations are unlabored. No use of accessory muscles. Clear to auscultation bilaterally without wheezing, rales, or rhonchi.  GI: Abdomen soft, nontender, nondistended. No HSM appreciated.   EXTREM: No LE edema today. No cyanosis or clubbing.  NEURO: Alert and oriented, cooperative. Gait not formally assessed. No obvious focal deficits.   PSYCH: Normal affect.  SKIN: Warm and dry. No rashes or petechiae appreciated. "       Recent Lab Results:    BMP RESULTS:  Lab Results   Component Value Date     08/21/2019    POTASSIUM 4.5 08/21/2019    CHLORIDE 105 08/21/2019    CO2 27 08/21/2019    ANIONGAP 4 08/21/2019    GLC 94 08/21/2019    BUN 31 (H) 08/21/2019    CR 1.89 (H) 08/21/2019    GFRESTIMATED 34 (L) 08/21/2019    GFRESTBLACK 39 (L) 08/21/2019    TAYLOR 9.3 08/21/2019      Results for TREY BURGER (MRN [id number]) as of 7/12/2019 08:04   Ref. Range 4/11/2019 08:43 4/17/2019 09:43 7/11/2019 09:55   N-Terminal Pro Bnp Latest Ref Range: 0 - 450 pg/mL 5,449 (H) 3,557 (H) 3,617 (H)       Additional pertinent testing:  KP 6/11/19  Interpretation Summary     The visual ejection fraction is estimated at 40%.  The right ventricle is moderate to severely dilated. Moderately decreased  right ventricular systolic function  There is moderate (2+) mitral regurgitation.  There is mod-severe to severe (3-4+) tricuspid regurgitation. TR is central  due to RV annular dilatation. See 3D TV images, which were challenging,  towards the end of the study. GE probe was unavailable.  No thrombus is detected in the left atrial appendage.  A contrast injection (Bubble Study) was performed that was positive early  after the injection. Atrial bi-directional shunt on bubble and color.    Diana Zarate PA-C  [facility]  Pager [phone]

## 2019-08-21 ENCOUNTER — OFFICE VISIT (OUTPATIENT)
Dept: CARDIOLOGY | Facility: CLINIC | Age: 74
End: 2019-08-21
Attending: PHYSICIAN ASSISTANT
Payer: COMMERCIAL

## 2019-08-21 VITALS
WEIGHT: 146.5 LBS | SYSTOLIC BLOOD PRESSURE: 118 MMHG | HEIGHT: 68 IN | DIASTOLIC BLOOD PRESSURE: 70 MMHG | BODY MASS INDEX: 22.2 KG/M2 | HEART RATE: 70 BPM | OXYGEN SATURATION: 93 %

## 2019-08-21 DIAGNOSIS — I50.22 CHRONIC SYSTOLIC HEART FAILURE (H): ICD-10-CM

## 2019-08-21 DIAGNOSIS — I48.19 PERSISTENT ATRIAL FIBRILLATION (H): ICD-10-CM

## 2019-08-21 LAB
ANION GAP SERPL CALCULATED.3IONS-SCNC: 4 MMOL/L (ref 3–14)
BUN SERPL-MCNC: 31 MG/DL (ref 7–30)
CALCIUM SERPL-MCNC: 9.3 MG/DL (ref 8.5–10.1)
CHLORIDE SERPL-SCNC: 105 MMOL/L (ref 94–109)
CO2 SERPL-SCNC: 27 MMOL/L (ref 20–32)
CREAT SERPL-MCNC: 1.89 MG/DL (ref 0.66–1.25)
GFR SERPL CREATININE-BSD FRML MDRD: 34 ML/MIN/{1.73_M2}
GLUCOSE SERPL-MCNC: 94 MG/DL (ref 70–99)
POTASSIUM SERPL-SCNC: 4.5 MMOL/L (ref 3.4–5.3)
SODIUM SERPL-SCNC: 136 MMOL/L (ref 133–144)

## 2019-08-21 PROCEDURE — 36415 COLL VENOUS BLD VENIPUNCTURE: CPT | Performed by: INTERNAL MEDICINE

## 2019-08-21 PROCEDURE — 99214 OFFICE O/P EST MOD 30 MIN: CPT | Performed by: PHYSICIAN ASSISTANT

## 2019-08-21 PROCEDURE — 80048 BASIC METABOLIC PNL TOTAL CA: CPT | Performed by: PHYSICIAN ASSISTANT

## 2019-08-21 ASSESSMENT — MIFFLIN-ST. JEOR: SCORE: 1374.02

## 2019-08-21 NOTE — PATIENT INSTRUCTIONS
Call C.O.[email or web address]. nurse for any questions or concerns Mon-Fri 8am-4pm: [phone] For concerns after hours: [phone]    Medication changes:  NONE TODAY     Plan from today:  Return in Oct to see Dr Snow after you've met with the team at [facility].     Check a chest xray for routine monitoring while on amiodarone.     Lab results:   Component      Latest Ref Rng & Units 8/21/2019   Sodium      133 - 144 mmol/L 136   Potassium      3.4 - 5.3 mmol/L 4.5   Chloride      94 - 109 mmol/L 105   Carbon Dioxide      20 - 32 mmol/L 27   Anion Gap      3 - 14 mmol/L 4   Glucose      70 - 99 mg/dL 94   Urea Nitrogen      7 - 30 mg/dL 31 (H)   Creatinine      0.66 - 1.25 mg/dL 1.89 (H)   GFR Estimate      >60 mL/min/1.73:m2 34 (L)   GFR Estimate If Black      >60 mL/min/1.73:m2 39 (L)   Calcium      8.5 - 10.1 mg/dL 9.3

## 2019-08-21 NOTE — LETTER
8/21/2019    Kevan Bull MD  [address]  [facility] [id number]    RE: Misael Fischer       Dear Colleague,    I had the pleasure of seeing Misael Fischer in the [facility].      Cardiology Progress Note    Date of Service: 08/21/2019      Reason for visit: [facility] clinic follow up, nonischemic cardiomyopathy, severe tricuspid regurgitation.    Primary cardiologist: Dr. Snow ([facility]), Dr. 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  He also has longstanding significant tricuspid regurgitation with RV annular dilatation, for which he was previously evaluated at [facility]. He declined surgical intervention due to surgical risk. He has a cardiomyopathy that was initially felt secondary to his arrhythmias, as his EF at one point had near normalized with control of his rhythm. In 2017 he had a right and left heart catheterization performed showing trivial nonobstructive coronary disease, and PA pressures were normal with a mean of 19mmHg. His PCWP was elevated at 20mmHg, and his CI via Kirill (due to severe TR) was severely reduced at 1.9LPM. There was also question of a small right to left shunt and at least moderate mitral insufficiency.    He has had issues with recurrent afib with prior cardioversion and is on amiodarone (complicated by thyroid dysfunction, which is controlled), and is followed by Dr. Larkin of [facility]. 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  Optimization of his cardiomyopathy regimen has been challenging, as he has been intolerant of beta blockers due to fatigue, and inability to use ACE-I/ARB due to his CKD. Despite this, over the last few years he had done quite well with the need for minimal diuretics, until this spring.  When he saw Dr. Larkin the end of March, he noted increased pedal edema. Echo showed that his EF remained impaired at 35-40%, with continued moderately decreased RV function. His TR was reported as 4+, which was felt to be worse than previously. He continues with 2+ MR.     I first met Mr. Fischer in April 2019 when he was referred to the [facility] clinic for continued management. At our first visit he told me he had been adjusting his torsemide between 5mg and 15mg daily based on his swelling; he had been trying to maintain on lower doses when possible due to his CKD. 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 We added Imdur for afterload reduction. He then established with Dr. Snow for [facility], and a repeat KP in June of 2019 confirmed moderate MR and likely severe tricuspid regurgitation. Annual dilation was noted with central TR and mild coaptation. At that visit, he asked that he retrial metoprolol at 12.5mg daily. Tricuspid valve surgery again was recommended; he saw Dr. Kidd shortly after, though Mr. Fischer remains skeptical about proceeding as he feels clinically he does well. 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 He has requested a second opinion, and is currently working on arranging appts at [facility] with Dr. Freedman and Dr. Kessler.     At our visit in late June, he told me he hadn't been feeling as well with lower BP and heart rates at home, more fatigue, and dizziness. We reduced the amio to 100mg daily, and held the metoprolol once again. In addition, I asked him to take the Imdur in the evening before bed. We reduced his torsemide slightly to 10mg daily with an additional 5mg PRN. This resulted in overall improvement in his symptoms with more energy. His renal function had improved and his dizziness had resolved. At our follow  "up in July his heart rates were in the mid 60s for the most part (previously down in 50s and occasionally low 40s on his home checks), and systolic BP remained under good control. I made no changes that visit, as he had another trip to [place] in the near future.     I'm seeing him back today for our planned 6 week follow up. Since last visit he tells me he continues to feel well. He has again since traveled to [place] and [facility], and says he is not limited by his breathing in any way while there, despite having to be on his feet all day walking and climbing stairs. He did transiently have to increase his torsemide back to 15mg daily after his recent return to the [facility], but now feels he is back to his baseline. He has no new chest discomfort or palpitations, and takes his pulse frequently. It stays reliably in the 60-70s. He has no dizziness or presyncope. His only complaint revolves around some soreness on the lateral aspects of his lower legs which results in feeling \"wobbly\" when he first stands. He has not fallen. Bp today remains under excellent control. His [facility] appts are currently scheduled for mid September, but he admits to me today he may have to push them back due to planned Missionary travels once again.       ASSESSMENT/PLAN:    1. Nonischemic cardiomyopathy.   --Last echo (via KP) June 2019 with EF 40%, moderately to severely dilated RV and moderately reduced function. TR 3-4+. He also has 2+MR. Angiogram in 2017 showed only trivial nonobstructive coronary disease; at that time, [facility] showed no significant pulmonary hypertension with elevated filling pressures.   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 He remains euvolemic on exam and renal function is at baseline. Will continue torsemide at 10mg daily with an additional 5mg when he notes swelling. He is traveling again next week. I've asked him to continue to be aware of " sodium in his diet.    --Continue the Imdur at 30mg without change, which he takes in the evening before bed. He is not on ACE-I/ARB due to CKD.    --He has failed a trial of low dose beta blockers x 2 with fatigue and mild hypotension. I made no changes today as he's feeling well.    2. Severe tricuspid regurgitation.    --Tricuspid valve surgery has been recommended by Dr. Kidd. Mr. Fischer remains hesitant to proceed given that he has done well functionally overall. A second opinion has been requested and he is awaiting appt with Dr. Freedman and Dr. Kessler at [facility], hopefully next month.       3. Atrial fibrillation/flutter.   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 Most recent Ziopatch in April 2019 showed no afib, with PVC burden of 6%.    --He is on amiodarone (complicated by thyroid dysfunction), and feels better with recent dose reduction now at 100mg daily. He appears to continue with excellent rate control despite inability to tolerate beta blockers.   --Continue TSH/LFT monitoring while on amio. He is overdue for his annual CXR which he can schedule at his convenience.   --Continue Eliquis, which he is tolerating well.       Follow up plan:  Return to see Dr. Snow in ~2 months after he meets with the team at [facility]. I will continue to follow him in [facility] going forward as well.       Orders this Visit:  Orders Placed This Encounter   Procedures     X-ray Chest 2 vws*     Basic metabolic panel     N terminal pro BNP outpatient     Follow-Up with [facility] - Return MD visit     No orders of the defined types were placed in this encounter.    There are no discontinued medications.      CURRENT MEDICATIONS:  Current Outpatient Medications   Medication Sig Dispense Refill     Acetaminophen (TYLENOL PO) Take 500 mg by mouth every morning       amiodarone (PACERONE/CODARONE) 200 MG tablet Take 0.5 tablets (100 mg) by mouth daily 45  tablet 3     apixaban ANTICOAGULANT (ELIQUIS) 5 MG tablet Take 1 tablet (5 mg) by mouth 2 times daily 180 tablet 3     isosorbide mononitrate (IMDUR) 30 MG 24 hr tablet Take 1 tablet (30 mg) by mouth daily 90 tablet 3     levothyroxine (SYNTHROID/LEVOTHROID) 100 MCG tablet Take 88 mcg by mouth 88mcg on all days; 44mcg on Fridays       torsemide (DEMADEX) 20 MG tablet Take 10mg (half tab) once a day. Take an extra 5mg on days you note swelling or increase in weight as directed by [facility] clinci. 90 tablet 3     VITAMIN D, CHOLECALCIFEROL, PO Take 1,000 Units by mouth daily          ALLERGIES     Allergies   Allergen Reactions     Ace Inhibitors      Due to CKD, renal insufficiency     Pravastatin      Muscle aches       PAST MEDICAL HISTORY:  Past Medical History:   Diagnosis Date     Atrial flutter (H) 11/23/15     AVNRT (AV jade re-entry tachycardia) (H)     status post     BPH (benign prostatic hyperplasia)      CAD (coronary artery disease)     Abnormal Nuc 2014     Cardiomyopathy (H)     Echo 11/2015- EF 35-40%     CHF (congestive heart failure) (H) 11/25/2015    Echo 11/2015- EF 35-40%      CKD (chronic kidney disease)     not on ACE/ARB due to insufficiency     Elevated PSA      Encounter for cardioversion procedure 09/25/2018    1 shock with 120 joules successful      Hypercholesterolemia 7/10/2014     Malaria      Mild pulmonic regurgitation and RV dysfunction by prior echocardiogram      Non-rheumatic tricuspid valve insufficiency      Paroxysmal A-fib (H)      Persistent atrial fibrillation with rapid ventricular response (H)      Pigment deposition      Systolic heart failure (H)     Echo 11/2015- EF 35-40%     Tricuspid regurgitation     Mod 2016       PAST SURGICAL HISTORY:  Past Surgical History:   Procedure Laterality Date     ANESTHESIA CARDIOVERSION N/A 12/6/2016    Procedure: ANESTHESIA CARDIOVERSION;  Surgeon: GENERIC ANESTHESIA PROVIDER;  Location:  OR     ANESTHESIA CARDIOVERSION N/A 6/4/2018     Procedure: ANESTHESIA CARDIOVERSION;  ANESTHESIA CARDIOVERSION ;  Surgeon: GENERIC ANESTHESIA PROVIDER;  Location: [facility] OR     ANESTHESIA CARDIOVERSION N/A 9/25/2018    Procedure: ANESTHESIA CARDIOVERSION;  ANESTHESIA CARDIOVERSION ;  Surgeon: GENERIC ANESTHESIA PROVIDER;  Location: [facility] OR     C KP (TRANSESOPHAGEAL ECHO)  12/6/16     CARDIOVERSION  12/14/15     H ABLATION ATRIAL FLUTTER  3/21/16     ORTHOPEDIC SURGERY Left 1957    foot injury repair       FAMILY HISTORY:  Family History   Problem Relation Age of Onset     C.A.D. Mother      Prostate Cancer Father 73       SOCIAL HISTORY:  Social History     Socioeconomic History     Marital status:      Spouse name: None     Number of children: None     Years of education: None     Highest education level: None   Occupational History     None   Social Needs     Financial resource strain: None     Food insecurity:     Worry: None     Inability: None     Transportation needs:     Medical: None     Non-medical: None   Tobacco Use     Smoking status: Never Smoker     Smokeless tobacco: Never Used   Substance and Sexual Activity     Alcohol use: Yes     Comment: rare     Drug use: No     Sexual activity: Not Currently   Lifestyle     Physical activity:     Days per week: None     Minutes per session: None     Stress: None   Relationships     Social connections:     Talks on phone: None     Gets together: None     Attends [employer] service: None     Active member of club or organization: None     Attends meetings of clubs or organizations: None     Relationship status: None     Intimate partner violence:     Fear of current or ex partner: None     Emotionally abused: None     Physically abused: None     Forced sexual activity: None   Other Topics Concern     Parent/sibling w/ CABG, MI or angioplasty before 65F 55M? Not Asked      Service Not Asked     Blood Transfusions Not Asked     Caffeine Concern No     Occupational Exposure No     Comment: none     Hobby  "Hazards Not Asked     Sleep Concern No     Stress Concern No     Weight Concern No     Special Diet Yes     Comment: low salt     Back Care Not Asked     Exercise Yes     Comment: walking     Bike Helmet Not Asked     Seat Belt Yes     Self-Exams Not Asked   Social History Narrative     None       Review of Systems:  Cardiovascular: negative for chest pain, palpitations, pos occasional ankle edema.   Constitutional: negative for chills, sweats, fevers, fatigue.   Resp: Negative for dyspnea at rest, dyspnea on exertion, cough, known chronic lung disease  HEENT: Negative for new visual changes, frequent headaches  Gastrointestinal: negative for abdominal pain, bloating. Neg diarrhea, nausea, vomiting  Hematologic/lymphatic: pos for current systemic anticoagulation, neg hx of blood clots  Musculoskeletal: negative for new back pain, pos intermittent knee and leg pain, chronic.   Neurological: negative for focal weakness, LOC, seizures, syncope/presyncope/dizziness.      Physical Exam:  Vitals: /70 (BP Location: Right arm, Patient Position: Chair, Cuff Size: Adult Small)   Pulse 70   Ht 1.727 m (5' 8\")   Wt 66.5 kg (146 lb 8 oz)   SpO2 93%   BMI 22.28 kg/m      Wt Readings from Last 4 Encounters:   08/21/19 66.5 kg (146 lb 8 oz)   07/12/19 65.3 kg (143 lb 14.4 oz)   06/27/19 65.6 kg (144 lb 11.2 oz)   06/17/19 64.9 kg (143 lb)       GEN:  In general, this is a well nourished  male in no acute distress on room air.  Patient ambulatory, unaccompanied today.  HEENT:  Pupils equal, round. Sclerae nonicteric.   NECK: Supple, no masses appreciated. Trachea midline, no JVD.     C/V:  Appeared regular on exam again today. 2/6 MILDRED, heard best at RSB. No rub or gallop. No S3 or RV heave.   RESP: Respirations are unlabored. No use of accessory muscles. Clear to auscultation bilaterally without wheezing, rales, or rhonchi.  GI: Abdomen soft, nontender, nondistended. No HSM appreciated.   EXTREM: No LE edema " today. No cyanosis or clubbing.  NEURO: Alert and oriented, cooperative. Gait not formally assessed. No obvious focal deficits.   PSYCH: Normal affect.  SKIN: Warm and dry. No rashes or petechiae appreciated.       Recent Lab Results:    BMP RESULTS:  Lab Results   Component Value Date     08/21/2019    POTASSIUM 4.5 08/21/2019    CHLORIDE 105 08/21/2019    CO2 27 08/21/2019    ANIONGAP 4 08/21/2019    GLC 94 08/21/2019    BUN 31 (H) 08/21/2019    CR 1.89 (H) 08/21/2019    GFRESTIMATED 34 (L) 08/21/2019    GFRESTBLACK 39 (L) 08/21/2019    TAYLOR 9.3 08/21/2019      Results for TREY BURGER (MRN [id number]) as of 7/12/2019 08:04   Ref. Range 4/11/2019 08:43 4/17/2019 09:43 7/11/2019 09:55   N-Terminal Pro Bnp Latest Ref Range: 0 - 450 pg/mL 5,449 (H) 3,557 (H) 3,617 (H)       Additional pertinent testing:  KP 6/11/19  Interpretation Summary     The visual ejection fraction is estimated at 40%.  The right ventricle is moderate to severely dilated. Moderately decreased  right ventricular systolic function  There is moderate (2+) mitral regurgitation.  There is mod-severe to severe (3-4+) tricuspid regurgitation. TR is central  due to RV annular dilatation. See 3D TV images, which were challenging,  towards the end of the study. GE probe was unavailable.  No thrombus is detected in the left atrial appendage.  A contrast injection (Bubble Study) was performed that was positive early  after the injection. Atrial bi-directional shunt on bubble and color.    Diana Zarate PA-C  [facility]  Pager [phone]      Thank you for allowing me to participate in the care of your patient.      Sincerely,     JESSICA Berg     [facility]    cc:   JESSICA Berg  [facility]  [address] [place] [phone]

## 2019-08-21 NOTE — LETTER
8/21/2019    Kevan Bull MD  [address]  [facility] [id number]    RE: Misael Fischer       Dear Colleague,    I had the pleasure of seeing Misael Fischer in the [facility].      Cardiology Progress Note    Date of Service: 08/21/2019      Reason for visit: [facility] clinic follow up, nonischemic cardiomyopathy, severe tricuspid regurgitation.    Primary cardiologist: Dr. Snow ([facility]), Dr. 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  He also has longstanding significant tricuspid regurgitation with RV annular dilatation, for which he was previously evaluated at [facility]. He declined surgical intervention due to surgical risk. He has a cardiomyopathy that was initially felt secondary to his arrhythmias, as his EF at one point had near normalized with control of his rhythm. In 2017 he had a right and left heart catheterization performed showing trivial nonobstructive coronary disease, and PA pressures were normal with a mean of 19mmHg. His PCWP was elevated at 20mmHg, and his CI via Kirill (due to severe TR) was severely reduced at 1.9LPM. There was also question of a small right to left shunt and at least moderate mitral insufficiency.    He has had issues with recurrent afib with prior cardioversion and is on amiodarone (complicated by thyroid dysfunction, which is controlled), and is followed by Dr. Larkin of EP. 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  Optimization of his cardiomyopathy regimen has been challenging, as he has been intolerant of beta blockers due to fatigue, and inability to use ACE-I/ARB due to his CKD. Despite this, over the last few years he had done quite well with the need for minimal diuretics, until this spring.  When he saw Dr. Larkin the end of March, he noted increased pedal edema. Echo showed that his EF remained impaired at 35-40%, with continued moderately decreased RV function. His TR was reported as 4+, which was felt to be worse than previously. He continues with 2+ MR.     I first met Mr. Fischer in April 2019 when he was referred to the [facility] clinic for continued management. At our first visit he told me he had been adjusting his torsemide between 5mg and 15mg daily based on his swelling; he had been trying to maintain on lower doses when possible due to his CKD. 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 We added Imdur for afterload reduction. He then established with Dr. Snow for [facility], and a repeat KP in June of 2019 confirmed moderate MR and likely severe tricuspid regurgitation. Annual dilation was noted with central TR and mild coaptation. At that visit, he asked that he retrial metoprolol at 12.5mg daily. Tricuspid valve surgery again was recommended; he saw Dr. Kidd shortly after, though Mr. Fischer remains skeptical about proceeding as he feels clinically he does well. 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 He has requested a second opinion, and is currently working on arranging appts at [facility] with Dr. Freedman and Dr. Kessler.     At our visit in late June, he told me he hadn't been feeling as well with lower BP and heart rates at home, more fatigue, and dizziness. We reduced the amio to 100mg daily, and held the metoprolol once again. In addition, I asked him to take the Imdur in the evening before bed. We reduced his torsemide slightly to 10mg daily with an additional 5mg PRN. This resulted in overall improvement in his symptoms with more energy. His renal function had improved and his dizziness had resolved. At our follow  "up in July his heart rates were in the mid 60s for the most part (previously down in 50s and occasionally low 40s on his home checks), and systolic BP remained under good control. I made no changes that visit, as he had another trip to [place] in the near future.     I'm seeing him back today for our planned 6 week follow up. Since last visit he tells me he continues to feel well. He has again since traveled to [place] and [facility], and says he is not limited by his breathing in any way while there, despite having to be on his feet all day walking and climbing stairs. He did transiently have to increase his torsemide back to 15mg daily after his recent return to the [facility], but now feels he is back to his baseline. He has no new chest discomfort or palpitations, and takes his pulse frequently. It stays reliably in the 60-70s. He has no dizziness or presyncope. His only complaint revolves around some soreness on the lateral aspects of his lower legs which results in feeling \"wobbly\" when he first stands. He has not fallen. Bp today remains under excellent control. His [facility] appts are currently scheduled for mid September, but he admits to me today he may have to push them back due to planned Missionary travels once again.       ASSESSMENT/PLAN:    1. Nonischemic cardiomyopathy.   --Last echo (via KP) June 2019 with EF 40%, moderately to severely dilated RV and moderately reduced function. TR 3-4+. He also has 2+MR. Angiogram in 2017 showed only trivial nonobstructive coronary disease; at that time, RHC showed no significant pulmonary hypertension with elevated filling pressures.   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 He remains euvolemic on exam and renal function is at baseline. Will continue torsemide at 10mg daily with an additional 5mg when he notes swelling. He is traveling again next week. I've asked him to continue to be aware of " sodium in his diet.    --Continue the Imdur at 30mg without change, which he takes in the evening before bed. He is not on ACE-I/ARB due to CKD.    --He has failed a trial of low dose beta blockers x 2 with fatigue and mild hypotension. I made no changes today as he's feeling well.    2. Severe tricuspid regurgitation.    --Tricuspid valve surgery has been recommended by Dr. Kidd. Mr. Fischer remains hesitant to proceed given that he has done well functionally overall. A second opinion has been requested and he is awaiting appt with Dr. Freedman and Dr. Kessler at [facility], hopefully next month.       3. Atrial fibrillation/flutter.   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 Most recent Ziopatch in April 2019 showed no afib, with PVC burden of 6%.    --He is on amiodarone (complicated by thyroid dysfunction), and feels better with recent dose reduction now at 100mg daily. He appears to continue with excellent rate control despite inability to tolerate beta blockers.   --Continue TSH/LFT monitoring while on amio. He is overdue for his annual CXR which he can schedule at his convenience.   --Continue Eliquis, which he is tolerating well.       Follow up plan:  Return to see Dr. Snow in ~2 months after he meets with the team at [facility]. I will continue to follow him in [facility] going forward as well.       Orders this Visit:  Orders Placed This Encounter   Procedures     X-ray Chest 2 vws*     Basic metabolic panel     N terminal pro BNP outpatient     Follow-Up with [facility] - Return MD visit     No orders of the defined types were placed in this encounter.    There are no discontinued medications.      CURRENT MEDICATIONS:  Current Outpatient Medications   Medication Sig Dispense Refill     Acetaminophen (TYLENOL PO) Take 500 mg by mouth every morning       amiodarone (PACERONE/CODARONE) 200 MG tablet Take 0.5 tablets (100 mg) by mouth daily 45  tablet 3     apixaban ANTICOAGULANT (ELIQUIS) 5 MG tablet Take 1 tablet (5 mg) by mouth 2 times daily 180 tablet 3     isosorbide mononitrate (IMDUR) 30 MG 24 hr tablet Take 1 tablet (30 mg) by mouth daily 90 tablet 3     levothyroxine (SYNTHROID/LEVOTHROID) 100 MCG tablet Take 88 mcg by mouth 88mcg on all days; 44mcg on Fridays       torsemide (DEMADEX) 20 MG tablet Take 10mg (half tab) once a day. Take an extra 5mg on days you note swelling or increase in weight as directed by [facility] clinci. 90 tablet 3     VITAMIN D, CHOLECALCIFEROL, PO Take 1,000 Units by mouth daily          ALLERGIES     Allergies   Allergen Reactions     Ace Inhibitors      Due to CKD, renal insufficiency     Pravastatin      Muscle aches       PAST MEDICAL HISTORY:  Past Medical History:   Diagnosis Date     Atrial flutter (H) 11/23/15     AVNRT (AV jade re-entry tachycardia) (H)     status post     BPH (benign prostatic hyperplasia)      CAD (coronary artery disease)     Abnormal Nuc 2014     Cardiomyopathy (H)     Echo 11/2015- EF 35-40%     CHF (congestive heart failure) (H) 11/25/2015    Echo 11/2015- EF 35-40%      CKD (chronic kidney disease)     not on ACE/ARB due to insufficiency     Elevated PSA      Encounter for cardioversion procedure 09/25/2018    1 shock with 120 joules successful      Hypercholesterolemia 7/10/2014     Malaria      Mild pulmonic regurgitation and RV dysfunction by prior echocardiogram      Non-rheumatic tricuspid valve insufficiency      Paroxysmal A-fib (H)      Persistent atrial fibrillation with rapid ventricular response (H)      Pigment deposition      Systolic heart failure (H)     Echo 11/2015- EF 35-40%     Tricuspid regurgitation     Mod 2016       PAST SURGICAL HISTORY:  Past Surgical History:   Procedure Laterality Date     ANESTHESIA CARDIOVERSION N/A 12/6/2016    Procedure: ANESTHESIA CARDIOVERSION;  Surgeon: GENERIC ANESTHESIA PROVIDER;  Location:  OR     ANESTHESIA CARDIOVERSION N/A 6/4/2018     Procedure: ANESTHESIA CARDIOVERSION;  ANESTHESIA CARDIOVERSION ;  Surgeon: GENERIC ANESTHESIA PROVIDER;  Location: [facility] OR     ANESTHESIA CARDIOVERSION N/A 9/25/2018    Procedure: ANESTHESIA CARDIOVERSION;  ANESTHESIA CARDIOVERSION ;  Surgeon: GENERIC ANESTHESIA PROVIDER;  Location: [facility] OR     C KP (TRANSESOPHAGEAL ECHO)  12/6/16     CARDIOVERSION  12/14/15     H ABLATION ATRIAL FLUTTER  3/21/16     ORTHOPEDIC SURGERY Left 1957    foot injury repair       FAMILY HISTORY:  Family History   Problem Relation Age of Onset     C.A.D. Mother      Prostate Cancer Father 73       SOCIAL HISTORY:  Social History     Socioeconomic History     Marital status:      Spouse name: None     Number of children: None     Years of education: None     Highest education level: None   Occupational History     None   Social Needs     Financial resource strain: None     Food insecurity:     Worry: None     Inability: None     Transportation needs:     Medical: None     Non-medical: None   Tobacco Use     Smoking status: Never Smoker     Smokeless tobacco: Never Used   Substance and Sexual Activity     Alcohol use: Yes     Comment: rare     Drug use: No     Sexual activity: Not Currently   Lifestyle     Physical activity:     Days per week: None     Minutes per session: None     Stress: None   Relationships     Social connections:     Talks on phone: None     Gets together: None     Attends Yazidism service: None     Active member of club or organization: None     Attends meetings of clubs or organizations: None     Relationship status: None     Intimate partner violence:     Fear of current or ex partner: None     Emotionally abused: None     Physically abused: None     Forced sexual activity: None   Other Topics Concern     Parent/sibling w/ CABG, MI or angioplasty before 65F 55M? Not Asked      Service Not Asked     Blood Transfusions Not Asked     Caffeine Concern No     Occupational Exposure No     Comment: none     Hobby  "Hazards Not Asked     Sleep Concern No     Stress Concern No     Weight Concern No     Special Diet Yes     Comment: low salt     Back Care Not Asked     Exercise Yes     Comment: walking     Bike Helmet Not Asked     Seat Belt Yes     Self-Exams Not Asked   Social History Narrative     None       Review of Systems:  Cardiovascular: negative for chest pain, palpitations, pos occasional ankle edema.   Constitutional: negative for chills, sweats, fevers, fatigue.   Resp: Negative for dyspnea at rest, dyspnea on exertion, cough, known chronic lung disease  HEENT: Negative for new visual changes, frequent headaches  Gastrointestinal: negative for abdominal pain, bloating. Neg diarrhea, nausea, vomiting  Hematologic/lymphatic: pos for current systemic anticoagulation, neg hx of blood clots  Musculoskeletal: negative for new back pain, pos intermittent knee and leg pain, chronic.   Neurological: negative for focal weakness, LOC, seizures, syncope/presyncope/dizziness.      Physical Exam:  Vitals: /70 (BP Location: Right arm, Patient Position: Chair, Cuff Size: Adult Small)   Pulse 70   Ht 1.727 m (5' 8\")   Wt 66.5 kg (146 lb 8 oz)   SpO2 93%   BMI 22.28 kg/m      Wt Readings from Last 4 Encounters:   08/21/19 66.5 kg (146 lb 8 oz)   07/12/19 65.3 kg (143 lb 14.4 oz)   06/27/19 65.6 kg (144 lb 11.2 oz)   06/17/19 64.9 kg (143 lb)       GEN:  In general, this is a well nourished  male in no acute distress on room air.  Patient ambulatory, unaccompanied today.  HEENT:  Pupils equal, round. Sclerae nonicteric.   NECK: Supple, no masses appreciated. Trachea midline, no JVD.     C/V:  Appeared regular on exam again today. 2/6 MILDRED, heard best at RSB. No rub or gallop. No S3 or RV heave.   RESP: Respirations are unlabored. No use of accessory muscles. Clear to auscultation bilaterally without wheezing, rales, or rhonchi.  GI: Abdomen soft, nontender, nondistended. No HSM appreciated.   EXTREM: No LE edema " today. No cyanosis or clubbing.  NEURO: Alert and oriented, cooperative. Gait not formally assessed. No obvious focal deficits.   PSYCH: Normal affect.  SKIN: Warm and dry. No rashes or petechiae appreciated.       Recent Lab Results:    BMP RESULTS:  Lab Results   Component Value Date     08/21/2019    POTASSIUM 4.5 08/21/2019    CHLORIDE 105 08/21/2019    CO2 27 08/21/2019    ANIONGAP 4 08/21/2019    GLC 94 08/21/2019    BUN 31 (H) 08/21/2019    CR 1.89 (H) 08/21/2019    GFRESTIMATED 34 (L) 08/21/2019    GFRESTBLACK 39 (L) 08/21/2019    TAYLOR 9.3 08/21/2019      Results for TREY BURGER (MRN [id number]) as of 7/12/2019 08:04   Ref. Range 4/11/2019 08:43 4/17/2019 09:43 7/11/2019 09:55   N-Terminal Pro Bnp Latest Ref Range: 0 - 450 pg/mL 5,449 (H) 3,557 (H) 3,617 (H)       Additional pertinent testing:  KP 6/11/19  Interpretation Summary     The visual ejection fraction is estimated at 40%.  The right ventricle is moderate to severely dilated. Moderately decreased  right ventricular systolic function  There is moderate (2+) mitral regurgitation.  There is mod-severe to severe (3-4+) tricuspid regurgitation. TR is central  due to RV annular dilatation. See 3D TV images, which were challenging,  towards the end of the study. GE probe was unavailable.  No thrombus is detected in the left atrial appendage.  A contrast injection (Bubble Study) was performed that was positive early  after the injection. Atrial bi-directional shunt on bubble and color.    Diana Zarate PA-C  [facility]  Pager [phone]      Thank you for allowing me to participate in the care of your patient.    Sincerely,     JESSICA Berg     [facility]

## 2019-08-22 ENCOUNTER — HOSPITAL ENCOUNTER (OUTPATIENT)
Dept: GENERAL RADIOLOGY | Facility: CLINIC | Age: 74
Discharge: HOME | End: 2019-08-22
Attending: PHYSICIAN ASSISTANT | Admitting: PHYSICIAN ASSISTANT
Payer: COMMERCIAL

## 2019-08-22 DIAGNOSIS — I48.19 PERSISTENT ATRIAL FIBRILLATION (H): ICD-10-CM

## 2019-08-22 DIAGNOSIS — I50.22 CHRONIC SYSTOLIC HEART FAILURE (H): ICD-10-CM

## 2019-08-22 PROCEDURE — 71046 X-RAY EXAM CHEST 2 VIEWS: CPT

## 2019-08-23 ENCOUNTER — TELEPHONE (OUTPATIENT)
Dept: CARDIOLOGY | Facility: CLINIC | Age: 74
End: 2019-08-23

## 2019-08-23 NOTE — TELEPHONE ENCOUNTER
Spoke to pt regarding CXR. Pt thankful for the call. He has appts coming up at [facility] with Dr Cary and Dr Kessler and then with Dr Snow 11/27. Pt would like writer to let Diana know about upcoming appts at [facility]. Will let her know  Helen Peters RN 3:49 PM 08/23/19

## 2019-08-23 NOTE — TELEPHONE ENCOUNTER
----- Message from JESSICA Berg sent at 8/22/2019 11:08 AM CDT -----  Regarding: cxr  Please call Mr Fischer and let him know I looked at his CXR. We did this as routine amio monitoring.  The official read suggested perhaps a small amount of fluid in his lungs, but it is minimal, and when compared to a few years ago, it was about the same.  No changes/intervention needed.  matt Ortiz

## 2019-11-02 NOTE — LETTER
6/27/2019    Kevan Bull MD  [address]  [facility] [address]    RE: Misael Fischer       Dear Colleague,    I had the pleasure of seeing Misael Fischer in the [facility].      Cardiology Progress Note    Date of Service: 06/27/2019      Reason for visit: [facility] clinic follow up, nonischemic cardiomyopathy, severe tricuspid regurgitation.    Primary cardiologist: Dr. Snow (CORE), Dr. 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  He has known significant tricuspid regurgitation with RV annular dilatation, for which he was previously evaluated at [facility] though declined surgical intervention due to surgical risk. In addition, he has a cardiomyopathy that was initially felt secondary to his arrhythmias, as his EF at one point had near normalized with control of his rhythm. In 2017 he had a right and left heart catheterization performed showing trivial nonobstructive coronary disease, and PA pressures were normal with a mean of 19mmHg. His PCWP was elevated at 20mmHg, and his CI via Kirill (due to severe TR) was severely reduced at 1.9LPM. There was also question of a small right to left shunt and at least moderate mitral insufficiency.    He has had issues with recurrent afib with prior cardioversion and now on amiodarone (complicated by thyroid dysfunction, which is now controlled), and is followed by Dr. Larkin of EP. 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  Optimization of his cardiomyopathy regimen has been challenging, as he has been intolerant of beta blockers due to fatigue, and inability to use ACE-I/ARB due to his CKD. Over the last few years he had done quite well with the need for minimal diuretics, until this spring. When he saw  Dr. Larkin the end of March, he noted increased pedal edema. Echo showed that his EF remained 35-40%, with continued moderately decreased RV function. His TR was reported as 4+, which was felt to be worse than previously. He also has ongoing moderate 2+ MR. Mr Fischer was then referred to the CORE clinic for continued management.    I met him in early April 2019. At our first visit he told me he had been adjusting his torsemide between 5mg and 15mg daily based on his swelling; he had been trying to maintain on lower doses when possible due to his kidney dysfunction. Interestingly, he tells me he doesn't require his diuretics at all when [place] is [place] work, and remains quite active when he goes there, with minimal symptoms. I have since slowly increased his torsemide, now up to 20mg daily, and added Imdur for afterload reduction.    I then referred him to Dr. Snow for further evaluation of his valvular dysfunction. He then performed a KP which confirmed moderate MR and likely severe tricuspid regurgitation. Annual dilation was noted with central TR and mild coaptation. At that visit, he asked that he retrial metoprolol at 12.5mg daily. Also, tricuspid valve surgery was recommended; he saw Dr. Kidd last week, who also suggested surgery. 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 He has requested a second opinion, and is currently working on arranging appts at [facility] with Dr. Freedman and Dr. Kessler. Today he is here for our routine planned CORE clinic follow up.    Over the last few weeks he tells me he hasn't been feeling as well. He notes that his BP and heart rates have been running lower lately at home. Systolic pressure is nearly always less than 100 (here today as well, he is 98/60). His heart rate today is 50, and at home runs 50 to low 60s. He notes less  energy than usual and gets somewhat dizzy when he first stands. From a swelling standpoint, he thinks he's doing well. Home weight ranges between 137-143 at home on 20mg daily of torsemide. He takes an occasional extra 5mg of torsemide if he notes some abdominal bloating, which is where he retains his fluid first. Labs done yesterday show a mild decline in his renal function, with BUN/Scr up to 43/2.34 respectively. Fortunately, he continues to deny significant GANT or orthopnea.       ASSESSMENT/PLAN:    1. Nonischemic cardiomyopathy.   --Last echo (via KP) June 2019 with EF 40%, with moderately to severely dilated RV and moderately reduced function. TR 3-4+. He also has 2+MR. Angiogram in 2017 showed only trival coronary disease; at that time, RHC showed no significant pulmonary hypertension with elevated filling pressures.   --His swelling is improved on higher doses of torsemide, but his renal function has declined slightly. Will reduce his torsemide to 10mg daily and he can take the extra 5mg on days he notes more edema or weight gain. 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 I've asked him to continue to be aware of sodium in his diet.    --He is feeling worse back on the beta blockers with fatigue, and mild hypotension and bradycardia. Will hold this once again. For now, continue the isosorbide without change.    2. Severe tricuspid regurgitation.    --Tricuspid valve surgery has been recommended by Dr. Kidd. Mr. Fischer admits he remains a bit hesitant to proceed given that he has done well functionally overall. A second opinion has been requested. Dr. Snow has recommended he see Dr. Freedman and Dr. Kessler at [facility]. He is working on arranging these appts in the near future.        3. Atrial fibrillation/flutter.   --Following with EP; he is on amiodarone (complicated by thyroid dysfunction, which is now  controlled). 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 Most recent Ziopatch in April 2019 showed no afib, with PVC burden of 6%.    --Will touch base with Dr. Larkin to see if we may be able to reduce his amio dose to allow for low dose beta blockers. I will contact Mr. Fischer if there are any further recommendations.   --Continue Eliquis, which he is tolerating well.     Addendum: 6/28: After input from Dr. Larkin and Dr. Snow: ok to reduce amio to 100mg daily. I called and informed patient of this change.  I will have our office call him in ~1 week and get an update. If able to see a drift up in HR/BP slightly, in a few weeks will consider reintroducing low dose beta blocker.       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         Orders this Visit:  Orders Placed This Encounter   Procedures     Basic metabolic panel     N terminal pro BNP outpatient     Follow-Up with [facility] - MAGED visit     Orders Placed This Encounter   Medications     torsemide (DEMADEX) 20 MG tablet     Sig: Take 10mg (half tab) once a day. Take an extra 5mg on days you note swelling or increase in weight as directed by CORE clinci.     Dispense:  90 tablet     Refill:  3     Medications Discontinued During This Encounter   Medication Reason     metoprolol succinate ER (TOPROL-XL) 25 MG 24 hr tablet Stop at Discharge     torsemide (DEMADEX) 20 MG tablet            CURRENT MEDICATIONS:  Current Outpatient Medications   Medication Sig Dispense Refill     Acetaminophen (TYLENOL PO) Take 500 mg by mouth every morning       amiodarone (PACERONE/CODARONE) 200 MG tablet Take 1 tablet (200 mg) by mouth daily 90 tablet 3     apixaban ANTICOAGULANT (ELIQUIS) 5 MG tablet Take 1 tablet (5 mg) by mouth 2 times daily 180 tablet 3     isosorbide mononitrate (IMDUR) 30 MG 24 hr tablet Take 1 tablet  (30 mg) by mouth daily 90 tablet 3     levothyroxine (SYNTHROID/LEVOTHROID) 100 MCG tablet Take 88 mcg by mouth 88mcg on all days; 44mcg on Fridays       torsemide (DEMADEX) 20 MG tablet Take 10mg (half tab) once a day. Take an extra 5mg on days you note swelling or increase in weight as directed by CORE clinci. 90 tablet 3     VITAMIN D, CHOLECALCIFEROL, PO Take 1,000 Units by mouth daily          ALLERGIES     Allergies   Allergen Reactions     Ace Inhibitors      Due to CKD, renal insufficiency     Pravastatin      Muscle aches       PAST MEDICAL HISTORY:  Past Medical History:   Diagnosis Date     Atrial flutter (H) 11/23/15     AVNRT (AV jade re-entry tachycardia) (H)     status post     BPH (benign prostatic hyperplasia)      CAD (coronary artery disease)     Abnormal Nuc 2014     Cardiomyopathy (H)     Echo 11/2015- EF 35-40%     CHF (congestive heart failure) (H) 11/25/2015    Echo 11/2015- EF 35-40%      CKD (chronic kidney disease)     not on ACE/ARB due to insufficiency     Elevated PSA      Encounter for cardioversion procedure 09/25/2018    1 shock with 120 joules successful      Hypercholesterolemia 7/10/2014     Malaria      Mild pulmonic regurgitation and RV dysfunction by prior echocardiogram      Non-rheumatic tricuspid valve insufficiency      Paroxysmal A-fib (H)      Persistent atrial fibrillation with rapid ventricular response (H)      Pigment deposition      Systolic heart failure (H)     Echo 11/2015- EF 35-40%     Tricuspid regurgitation     Mod 2016       PAST SURGICAL HISTORY:  Past Surgical History:   Procedure Laterality Date     ANESTHESIA CARDIOVERSION N/A 12/6/2016    Procedure: ANESTHESIA CARDIOVERSION;  Surgeon: GENERIC ANESTHESIA PROVIDER;  Location:  OR     ANESTHESIA CARDIOVERSION N/A 6/4/2018    Procedure: ANESTHESIA CARDIOVERSION;  ANESTHESIA CARDIOVERSION ;  Surgeon: GENERIC ANESTHESIA PROVIDER;  Location:  OR     ANESTHESIA CARDIOVERSION N/A 9/25/2018    Procedure:  ANESTHESIA CARDIOVERSION;  ANESTHESIA CARDIOVERSION ;  Surgeon: GENERIC ANESTHESIA PROVIDER;  Location: [facility] OR     C KP (TRANSESOPHAGEAL ECHO)  12/6/16     CARDIOVERSION  12/14/15     H ABLATION ATRIAL FLUTTER  3/21/16     ORTHOPEDIC SURGERY Left 1957    foot injury repair       FAMILY HISTORY:  Family History   Problem Relation Age of Onset     C.A.D. Mother      Prostate Cancer Father 73       SOCIAL HISTORY:  Social History     Socioeconomic History     Marital status:      Spouse name: None     Number of children: None     Years of education: None     Highest education level: None   Occupational History     None   Social Needs     Financial resource strain: None     Food insecurity:     Worry: None     Inability: None     Transportation needs:     Medical: None     Non-medical: None   Tobacco Use     Smoking status: Never Smoker     Smokeless tobacco: Never Used   Substance and Sexual Activity     Alcohol use: Yes     Comment: rare     Drug use: No     Sexual activity: Not Currently   Lifestyle     Physical activity:     Days per week: None     Minutes per session: None     Stress: None   Relationships     Social connections:     Talks on phone: None     Gets together: None     Attends Hindu service: None     Active member of club or organization: None     Attends meetings of clubs or organizations: None     Relationship status: None     Intimate partner violence:     Fear of current or ex partner: None     Emotionally abused: None     Physically abused: None     Forced sexual activity: None   Other Topics Concern     Parent/sibling w/ CABG, MI or angioplasty before 65F 55M? Not Asked      Service Not Asked     Blood Transfusions Not Asked     Caffeine Concern No     Occupational Exposure No     Comment: none     Hobby Hazards Not Asked     Sleep Concern No     Stress Concern No     Weight Concern No     Special Diet Yes     Comment: low salt     Back Care Not Asked     Exercise Yes      "Comment: walking     Bike Helmet Not Asked     Seat Belt Yes     Self-Exams Not Asked   Social History Narrative     None       Review of Systems:  Cardiovascular: negative for chest pain, palpitations, pos LE edema, stable.  Constitutional: negative for chills, sweats, fevers. Pos fatigue.  Resp: Negative for dyspnea at rest, dyspnea on exertion, cough, known chronic lung disease  HEENT: Negative for new visual changes, frequent headaches  Gastrointestinal: negative for abdominal pain, pos occasional abd bloating. Neg diarrhea, nausea, vomiting  Hematologic/lymphatic: pos for current systemic anticoagulation, neg hx of blood clots  Musculoskeletal: negative for new back pain, pos knee pain.  Neurological: negative for focal weakness, LOC, seizures, syncope/presyncope. Pos dizziness when first stands.      Physical Exam:  Vitals: BP 98/60 (BP Location: Right arm, Patient Position: Sitting, Cuff Size: Adult Regular)   Pulse 50   Resp 18   Ht 1.727 m (5' 8\")   Wt 65.6 kg (144 lb 11.2 oz)   SpO2 95%   BMI 22.00 kg/m      Wt Readings from Last 4 Encounters:   06/27/19 65.6 kg (144 lb 11.2 oz)   06/17/19 64.9 kg (143 lb)   06/17/19 65.7 kg (144 lb 12.8 oz)   06/11/19 63.6 kg (140 lb 5 oz)       GEN:  In general, this is a well nourished  male in no acute distress on room air.  Patient ambulatory, unaccompanied today.  HEENT:  Pupils equal, round. Sclerae nonicteric.   NECK: Supple, no masses appreciated. Trachea midline, no obvious JVD.     C/V:  Appeared regular on exam again today. 2/6 MILDRED, heard best at RSB. No rub or gallop. No S3 or RV heave.   RESP: Respirations are unlabored. No use of accessory muscles. Clear to auscultation bilaterally without wheezing, rales, or rhonchi.  GI: Abdomen soft, nontender, nondistended. No HSM appreciated.   EXTREM: Trace bilateral pitting ankle edema.  No cyanosis or clubbing.  NEURO: Alert and oriented, cooperative. Gait not assessed. No obvious focal deficits. "   PSYCH: Normal affect.  SKIN: Warm and dry. No rashes or petechiae appreciated.       Recent Lab Results:    BMP RESULTS:  Lab Results   Component Value Date     06/26/2019    POTASSIUM 4.5 06/26/2019    CHLORIDE 103 06/26/2019    CO2 26 06/26/2019    ANIONGAP 7 06/26/2019    GLC 94 06/26/2019    BUN 43 (H) 06/26/2019    CR 2.34 (H) 06/26/2019    GFRESTIMATED 26 (L) 06/26/2019    GFRESTBLACK 30 (L) 06/26/2019    TAYLOR 8.7 06/26/2019          Additional pertinent testing:  KP 6/11/19  Interpretation Summary     The visual ejection fraction is estimated at 40%.  The right ventricle is moderate to severely dilated. Moderately decreased  right ventricular systolic function  There is moderate (2+) mitral regurgitation.  There is mod-severe to severe (3-4+) tricuspid regurgitation. TR is central  due to RV annular dilatation. See 3D TV images, which were challenging,  towards the end of the study. GE probe was unavailable.  No thrombus is detected in the left atrial appendage.  A contrast injection (Bubble Study) was performed that was positive early  after the injection. Atrial bi-directional shunt on bubble and color.    Diana Zarate PA-C  [facility]  Pager [phone]      Ideally would be good to have some BB on board given LV dysfunction and PVCs. See what Dr. Larkin says.    Reduce amio to 100 mg daily. Thanks, qp    Thanks. I am guessing you will inform the patient of Dr. Larkin's plan    Thank you for allowing me to participate in the care of your patient.    Sincerely,     JESSICA Berg     [facility]     Adult

## 2019-11-03 ENCOUNTER — HEALTH MAINTENANCE LETTER (OUTPATIENT)
Age: 74
End: 2019-11-03

## 2019-11-20 ENCOUNTER — TRANSFERRED RECORDS (OUTPATIENT)
Dept: HEALTH INFORMATION MANAGEMENT | Facility: CLINIC | Age: 74
End: 2019-11-20

## 2019-11-25 DIAGNOSIS — I48.3 TYPICAL ATRIAL FLUTTER (H): Primary | ICD-10-CM

## 2019-11-25 DIAGNOSIS — I50.9 HEART FAILURE, UNSPECIFIED (H): ICD-10-CM

## 2019-11-25 NOTE — PROGRESS NOTES
TSH, Hepatic panel orders placed per 6 month amiodarone protocol. To be drawn 11/27/19 prior to CORE MD visit w/Dr. Snow. Provider to review and sign.    Reyes Espino LPN  [facility]  [phone]

## 2019-11-27 ENCOUNTER — OFFICE VISIT (OUTPATIENT)
Dept: CARDIOLOGY | Facility: CLINIC | Age: 74
End: 2019-11-27
Attending: PHYSICIAN ASSISTANT
Payer: COMMERCIAL

## 2019-11-27 VITALS
BODY MASS INDEX: 22.39 KG/M2 | DIASTOLIC BLOOD PRESSURE: 58 MMHG | HEART RATE: 68 BPM | WEIGHT: 147.7 LBS | SYSTOLIC BLOOD PRESSURE: 96 MMHG | HEIGHT: 68 IN | OXYGEN SATURATION: 97 %

## 2019-11-27 DIAGNOSIS — I50.22 CHRONIC SYSTOLIC HEART FAILURE (H): ICD-10-CM

## 2019-11-27 DIAGNOSIS — I48.19 PERSISTENT ATRIAL FIBRILLATION (H): ICD-10-CM

## 2019-11-27 DIAGNOSIS — I50.9 HEART FAILURE, UNSPECIFIED (H): ICD-10-CM

## 2019-11-27 DIAGNOSIS — I48.3 TYPICAL ATRIAL FLUTTER (H): ICD-10-CM

## 2019-11-27 LAB
ALBUMIN SERPL-MCNC: 3.8 G/DL (ref 3.4–5)
ALP SERPL-CCNC: 130 U/L (ref 40–150)
ALT SERPL W P-5'-P-CCNC: 23 U/L (ref 0–70)
ANION GAP SERPL CALCULATED.3IONS-SCNC: 5 MMOL/L (ref 3–14)
AST SERPL W P-5'-P-CCNC: 32 U/L (ref 0–45)
BILIRUB DIRECT SERPL-MCNC: 0.5 MG/DL (ref 0–0.2)
BILIRUB SERPL-MCNC: 1.2 MG/DL (ref 0.2–1.3)
BUN SERPL-MCNC: 41 MG/DL (ref 7–30)
CALCIUM SERPL-MCNC: 9.1 MG/DL (ref 8.5–10.1)
CHLORIDE SERPL-SCNC: 106 MMOL/L (ref 94–109)
CO2 SERPL-SCNC: 28 MMOL/L (ref 20–32)
CREAT SERPL-MCNC: 2.05 MG/DL (ref 0.66–1.25)
GFR SERPL CREATININE-BSD FRML MDRD: 31 ML/MIN/{1.73_M2}
GLUCOSE SERPL-MCNC: 93 MG/DL (ref 70–99)
NT-PROBNP SERPL-MCNC: 5822 PG/ML (ref 0–450)
POTASSIUM SERPL-SCNC: 4.4 MMOL/L (ref 3.4–5.3)
PROT SERPL-MCNC: 7.2 G/DL (ref 6.8–8.8)
SODIUM SERPL-SCNC: 139 MMOL/L (ref 133–144)
TSH SERPL DL<=0.005 MIU/L-ACNC: 1.76 MU/L (ref 0.4–4)

## 2019-11-27 PROCEDURE — 36415 COLL VENOUS BLD VENIPUNCTURE: CPT | Performed by: INTERNAL MEDICINE

## 2019-11-27 PROCEDURE — 99214 OFFICE O/P EST MOD 30 MIN: CPT | Performed by: INTERNAL MEDICINE

## 2019-11-27 PROCEDURE — 80048 BASIC METABOLIC PNL TOTAL CA: CPT | Performed by: INTERNAL MEDICINE

## 2019-11-27 PROCEDURE — 80076 HEPATIC FUNCTION PANEL: CPT | Performed by: INTERNAL MEDICINE

## 2019-11-27 PROCEDURE — 83880 ASSAY OF NATRIURETIC PEPTIDE: CPT | Performed by: INTERNAL MEDICINE

## 2019-11-27 PROCEDURE — 84443 ASSAY THYROID STIM HORMONE: CPT | Performed by: INTERNAL MEDICINE

## 2019-11-27 ASSESSMENT — MIFFLIN-ST. JEOR: SCORE: 1374.33

## 2019-11-27 NOTE — PROGRESS NOTES
Service Date: 11/27/2019      CARDIOLOGY CLINIC CONSULTATION       REASON FOR VISIT:  Tricuspid insufficiency.      HISTORY OF PRESENTING ILLNESS:  A very pleasant 74-year-old gentleman.  He is here for followup.  He was seen by me initially in 05/2019 after he transferred care to me after Dr. Castro left practice.  He has a history of atrial flutter status post ablation in 2015 and cardiomyopathy around that time with an EF of 35%.  He underwent ablation of his flutter and EP study at that time also showed AVNRT which was also ablated.  He also has PVCs which are under conservative management with long-term amiodarone and he follows up with Dr. Larkin for that.  His EF subsequently had improved from 40% up to 55% but RV function over the last few years has gotten worse including annular dilatation and severe tricuspid regurgitation.  Coronary angiography in 2017 has been nonobstructive and right heart catheterization showed findings consistent with severe tricuspid regurgitation back then.  Unfortunately, his RV function has continued to get worse and RV dilatation has ensued with severe tricuspid regurgitation and moderate MR, as noted on most recent echocardiography.  He had also met with Dr. Kidd who recommended a right mini thoracotomy approach earlier this year but the patient has been hesitant about wanting to get that done and wanted a second opinion for which I referred him for a tricuspid valve clip consideration to [facility].  He met with Dr. Abe Link and Dr. Freedman and the plan is for him to be evaluated by Dr. Gavin Mueller for another heart failure consultation and get a right heart catheterization.  He has already had his KP and potentially is going to get included in the TRILUMINATE trial.  Today, he is here for routine followup.  Denies any major symptoms.      PHYSICAL EXAMINATION:   VITAL SIGNS:  Blood pressure is 96/58 with a pulse of 68.   GENERAL:  Alert and oriented x3, in no acute distress.    NECK:  Supple.  JVP is elevated.  V waves are present.    LUNGS:  Clear.   CARDIAC:  Sounds reveal a systolic murmur.   EXTREMITIES:  Warm without edema.      ASSESSMENT AND PLAN:  Mr. Burger is here just for followup and wanted to close the loop and keep me in the loop for what is ongoing with him.  He denies any significant complaints today.  His creatinine is at baseline at about 2.05 today.  Denies any cardiovascular symptoms.  He is scheduled to see Dr. Mueller and get a right heart catheterization in the coming few weeks and then he is going to be involved in the tricuspid valve clip trial.  If that is not successful and the TR severity does not come down, I still think he should be considered for surgery and in that case, we will be happy to evaluate him here.  He has already met with Dr. Kidd before.  Otherwise, assuming things remain stable and his TR gets better, I will see him back in about a year in clinic with routine followup.  If anything changes, I have told him not to hesitate and contact us immediately.  At this point, [facility]       cc:   Kevan Bull MD    [facility]   [address] [place] [address]         KEYSHA REA MD             D: 2019   T: 2019   MT: JESSY      Name:     TREY BURGER   MRN:      [id number]        Account:      [id number]   :      1945           Service Date: 2019      Document: [id number]

## 2019-11-27 NOTE — LETTER
11/27/2019    Keavn Bull MD  [address]  [facility] [address]    RE: Misael Fischer       Dear Colleague,    I had the pleasure of seeing Misael Fischer in the [facility].    HPI and Plan:   See dictation [id number]    No orders of the defined types were placed in this encounter.    No orders of the defined types were placed in this encounter.    There are no discontinued medications.      Encounter Diagnoses   Name Primary?     Chronic systolic heart failure (H)      Persistent atrial fibrillation        CURRENT MEDICATIONS:  Current Outpatient Medications   Medication Sig Dispense Refill     Acetaminophen (TYLENOL PO) Take 500 mg by mouth every morning       amiodarone (PACERONE/CODARONE) 200 MG tablet Take 0.5 tablets (100 mg) by mouth daily 45 tablet 3     apixaban ANTICOAGULANT (ELIQUIS) 5 MG tablet Take 1 tablet (5 mg) by mouth 2 times daily 180 tablet 3     isosorbide mononitrate (IMDUR) 30 MG 24 hr tablet Take 1 tablet (30 mg) by mouth daily 90 tablet 3     levothyroxine (SYNTHROID/LEVOTHROID) 100 MCG tablet Take 88 mcg by mouth 88mcg on all days; 44mcg on Fridays       torsemide (DEMADEX) 20 MG tablet Take 10mg (half tab) once a day. Take an extra 5mg on days you note swelling or increase in weight as directed by CORE clinci. 90 tablet 3     VITAMIN D, CHOLECALCIFEROL, PO Take 1,000 Units by mouth daily          ALLERGIES     Allergies   Allergen Reactions     Ace Inhibitors      Due to CKD, renal insufficiency     Pravastatin      Muscle aches       PAST MEDICAL HISTORY:  Past Medical History:   Diagnosis Date     Atrial flutter (H) 11/23/15     AVNRT (AV jade re-entry tachycardia) (H)     status post     BPH (benign prostatic hyperplasia)      CAD (coronary artery disease)     Abnormal Nuc 2014     Cardiomyopathy (H)     Echo 11/2015- EF 35-40%     CHF (congestive heart failure) (H) 11/25/2015    Echo 11/2015- EF 35-40%      CKD (chronic kidney disease)     not  on ACE/ARB due to insufficiency     Elevated PSA      Encounter for cardioversion procedure 09/25/2018    1 shock with 120 joules successful      Hypercholesterolemia 7/10/2014     Malaria      Mild pulmonic regurgitation and RV dysfunction by prior echocardiogram      Non-rheumatic tricuspid valve insufficiency      Paroxysmal A-fib (H)      Persistent atrial fibrillation with rapid ventricular response      Pigment deposition      Systolic heart failure (H)     Echo 11/2015- EF 35-40%     Tricuspid regurgitation     Mod 2016       PAST SURGICAL HISTORY:  Past Surgical History:   Procedure Laterality Date     ANESTHESIA CARDIOVERSION N/A 12/6/2016    Procedure: ANESTHESIA CARDIOVERSION;  Surgeon: GENERIC ANESTHESIA PROVIDER;  Location:  OR     ANESTHESIA CARDIOVERSION N/A 6/4/2018    Procedure: ANESTHESIA CARDIOVERSION;  ANESTHESIA CARDIOVERSION ;  Surgeon: GENERIC ANESTHESIA PROVIDER;  Location:  OR     ANESTHESIA CARDIOVERSION N/A 9/25/2018    Procedure: ANESTHESIA CARDIOVERSION;  ANESTHESIA CARDIOVERSION ;  Surgeon: GENERIC ANESTHESIA PROVIDER;  Location:  OR      KP (TRANSESOPHAGEAL ECHO)  12/6/16     CARDIOVERSION  12/14/15     H ABLATION ATRIAL FLUTTER  3/21/16     ORTHOPEDIC SURGERY Left 1957    foot injury repair       FAMILY HISTORY:  Family History   Problem Relation Age of Onset     C.A.D. Mother      Prostate Cancer Father 73       SOCIAL HISTORY:  Social History     Socioeconomic History     Marital status:      Spouse name: None     Number of children: None     Years of education: None     Highest education level: None   Occupational History     None   Social Needs     Financial resource strain: None     Food insecurity:     Worry: None     Inability: None     Transportation needs:     Medical: None     Non-medical: None   Tobacco Use     Smoking status: Never Smoker     Smokeless tobacco: Never Used   Substance and Sexual Activity     Alcohol use: Yes     Comment: rare     Drug use: No  "    Sexual activity: Not Currently   Lifestyle     Physical activity:     Days per week: None     Minutes per session: None     Stress: None   Relationships     Social connections:     Talks on phone: None     Gets together: None     Attends Confucianist service: None     Active member of club or organization: None     Attends meetings of clubs or organizations: None     Relationship status: None     Intimate partner violence:     Fear of current or ex partner: None     Emotionally abused: None     Physically abused: None     Forced sexual activity: None   Other Topics Concern     Parent/sibling w/ CABG, MI or angioplasty before 65F 55M? Not Asked      Service Not Asked     Blood Transfusions Not Asked     Caffeine Concern No     Occupational Exposure No     Comment: none     Hobby Hazards Not Asked     Sleep Concern No     Stress Concern No     Weight Concern No     Special Diet Yes     Comment: low salt     Back Care Not Asked     Exercise Yes     Comment: walking     Bike Helmet Not Asked     Seat Belt Yes     Self-Exams Not Asked   Social History Narrative     None       Review of Systems:  Skin:  Negative     Eyes:  Positive for glasses  ENT:  Negative    Respiratory:  Negative    Cardiovascular:  Negative Positive for;edema  Gastroenterology: Negative    Genitourinary:  Negative prostate problem;nocturia  Musculoskeletal:  Positive for joint pain  Neurologic:  Negative    Psychiatric:  Negative    Heme/Lymph/Imm:  Negative easy bruising  Endocrine:  Negative thyroid disorder    Physical Exam:  Vitals: BP 96/58 (BP Location: Right arm, Patient Position: Sitting, Cuff Size: Adult Regular)   Pulse 68   Ht 1.727 m (5' 7.99\")   Wt 67 kg (147 lb 11.2 oz)   SpO2 97%   BMI 22.46 kg/m       Recent Lab Results:  LIPID RESULTS:  Lab Results   Component Value Date    CHOL 141 10/16/2018    HDL 61 10/16/2018    LDL 70 10/16/2018    TRIG 52 10/16/2018       LIVER ENZYME RESULTS:  Lab Results   Component Value Date "    AST 32 11/27/2019    ALT 23 11/27/2019       CBC RESULTS:  Lab Results   Component Value Date    WBC 5.1 09/20/2018    WBC 4.4 02/17/2018    RBC 5.20 09/20/2018    RBC 4.44 02/17/2018    HGB 16.6 04/11/2019    HCT 48.6 09/20/2018    MCV 93.5 09/20/2018    MCH 30.8 09/20/2018    MCHC 32.9 (A) 09/20/2018    RDW 14.6 02/17/2018     09/20/2018       BMP RESULTS:  Lab Results   Component Value Date     11/27/2019    POTASSIUM 4.4 11/27/2019    CHLORIDE 106 11/27/2019    CO2 28 11/27/2019    ANIONGAP 5 11/27/2019    GLC 93 11/27/2019    BUN 41 (H) 11/27/2019    CR 2.05 (H) 11/27/2019    GFRESTIMATED 31 (L) 11/27/2019    GFRESTBLACK 35 (L) 11/27/2019    TAYLOR 9.1 11/27/2019        A1C RESULTS:  Lab Results   Component Value Date    A1C 6.0 03/31/2016       INR RESULTS:  Lab Results   Component Value Date    INR 1.16 (H) 11/17/2017    INR 1.80 (H) 03/21/2016           CC  JESSICA Berg  [place] [id number]                    Thank you for allowing me to participate in the care of your patient.      Sincerely,     Vidhya Snow MD     [facility]    cc:   JESSICA Berg  [address] [address]

## 2019-11-27 NOTE — LETTER
11/27/2019      Kevan Bull MD  [address]  [facility] [address]      RE: Misael Fischer       Dear Colleague,    [facility] had the [facility].    Service Date: 11/27/2019      CARDIOLOGY CLINIC CONSULTATION       REASON FOR VISIT:  Tricuspid insufficiency.      HISTORY OF PRESENTING ILLNESS:  A very pleasant 74-year-old gentleman.  He is here for followup.  He was seen by me initially in 05/2019 after he transferred care to me after Dr. Castro left practice.  He has a history of atrial flutter status post ablation in 2015 and cardiomyopathy around that time with an EF of 35%.  He underwent ablation of his flutter and EP study at that time also showed AVNRT which was also ablated.  He also has PVCs which are under conservative management with long-term amiodarone and he follows up with Dr. Larkin for that.  His EF subsequently had improved from 40% up to 55% but RV function over the last few years has gotten worse including annular dilatation and severe tricuspid regurgitation.  Coronary angiography in 2017 has been nonobstructive and right heart catheterization showed findings consistent with severe tricuspid regurgitation back then.  Unfortunately, his RV function has continued to get worse and RV dilatation has ensued with severe tricuspid regurgitation and moderate MR, as noted on most recent echocardiography.  He had also met with Dr. Kidd who recommended a right mini thoracotomy approach earlier this year but the patient has been hesitant about wanting to get that done and wanted a second opinion for which I referred him for a tricuspid valve clip consideration to [facility].  He met with Dr. Abe Link and Dr. Freedman and the plan is for him to be evaluated by Dr. Gavin Mueller for another heart failure consultation and get a right heart catheterization.  He has already had his KP and potentially is going to get included in the  TRILUMINATE trial.  Today, he is here for routine followup.  Denies any major symptoms.      PHYSICAL EXAMINATION:   VITAL SIGNS:  Blood pressure is 96/58 with a pulse of 68.   GENERAL:  Alert and oriented x3, in no acute distress.   NECK:  Supple.  JVP is elevated.  V waves are present.    LUNGS:  Clear.   CARDIAC:  Sounds reveal a systolic murmur.   EXTREMITIES:  Warm without edema.      ASSESSMENT AND PLAN:  Mr. Burger is here just for followup and wanted to close the loop and keep me in the loop for what is ongoing with him.  He denies any significant complaints today.  His creatinine is at baseline at about 2.05 today.  Denies any cardiovascular symptoms.  He is scheduled to see Dr. Mueller and get a right heart catheterization in the coming few weeks and then he is going to be involved in the tricuspid valve clip trial.  If that is not successful and the TR severity does not come down, I still think he should be considered for surgery and in that case, we will be happy to evaluate him here.  He has already met with Dr. Kidd before.  Otherwise, assuming things remain stable and his TR gets better, I will see him back in about a year in clinic with routine followup.  If anything changes, I have told him not to hesitate and contact us immediately.  At this point, he wishes to continue following up at [facility].       cc:   Kevan Bull MD    [facility]   [address] [place] [address]         KEYSHA REA MD             D: 2019   T: 2019   MT: JESSY      Name:     TREY BURGER   MRN:      [id number]        Account:      [id number]   :      1945           Service Date: 2019      Document: [id number]         Outpatient Encounter Medications as of 2019   Medication Sig Dispense Refill     Acetaminophen (TYLENOL PO) Take 500 mg by mouth every morning       amiodarone (PACERONE/CODARONE) 200 MG tablet Take 0.5 tablets (100 mg) by mouth daily 45 tablet 3      apixaban ANTICOAGULANT (ELIQUIS) 5 MG tablet Take 1 tablet (5 mg) by mouth 2 times daily 180 tablet 3     isosorbide mononitrate (IMDUR) 30 MG 24 hr tablet Take 1 tablet (30 mg) by mouth daily 90 tablet 3     levothyroxine (SYNTHROID/LEVOTHROID) 100 MCG tablet Take 88 mcg by mouth 88mcg on all days; 44mcg on Fridays       torsemide (DEMADEX) 20 MG tablet Take 10mg (half tab) once a day. Take an extra 5mg on days you note swelling or increase in weight as directed by CORE clinci. 90 tablet 3     VITAMIN D, CHOLECALCIFEROL, PO Take 1,000 Units by mouth daily        No facility-administered encounter medications on file as of 11/27/2019.        Again, thank you for allowing me to participate in the care of your patient.      Sincerely,    Vidhya Snow MD     [facility]

## 2019-11-27 NOTE — PROGRESS NOTES
HPI and Plan:   See dictation [id number]    No orders of the defined types were placed in this encounter.    No orders of the defined types were placed in this encounter.    There are no discontinued medications.      Encounter Diagnoses   Name Primary?     Chronic systolic heart failure (H)      Persistent atrial fibrillation        CURRENT MEDICATIONS:  Current Outpatient Medications   Medication Sig Dispense Refill     Acetaminophen (TYLENOL PO) Take 500 mg by mouth every morning       amiodarone (PACERONE/CODARONE) 200 MG tablet Take 0.5 tablets (100 mg) by mouth daily 45 tablet 3     apixaban ANTICOAGULANT (ELIQUIS) 5 MG tablet Take 1 tablet (5 mg) by mouth 2 times daily 180 tablet 3     isosorbide mononitrate (IMDUR) 30 MG 24 hr tablet Take 1 tablet (30 mg) by mouth daily 90 tablet 3     levothyroxine (SYNTHROID/LEVOTHROID) 100 MCG tablet Take 88 mcg by mouth 88mcg on all days; 44mcg on Fridays       torsemide (DEMADEX) 20 MG tablet Take 10mg (half tab) once a day. Take an extra 5mg on days you note swelling or increase in weight as directed by CORE clinci. 90 tablet 3     VITAMIN D, CHOLECALCIFEROL, PO Take 1,000 Units by mouth daily          ALLERGIES     Allergies   Allergen Reactions     Ace Inhibitors      Due to CKD, renal insufficiency     Pravastatin      Muscle aches       PAST MEDICAL HISTORY:  Past Medical History:   Diagnosis Date     Atrial flutter (H) 11/23/15     AVNRT (AV jade re-entry tachycardia) (H)     status post     BPH (benign prostatic hyperplasia)      CAD (coronary artery disease)     Abnormal Nuc 2014     Cardiomyopathy (H)     Echo 11/2015- EF 35-40%     CHF (congestive heart failure) (H) 11/25/2015    Echo 11/2015- EF 35-40%      CKD (chronic kidney disease)     not on ACE/ARB due to insufficiency     Elevated PSA      Encounter for cardioversion procedure 09/25/2018    1 shock with 120 joules successful      Hypercholesterolemia 7/10/2014     Malaria      Mild pulmonic regurgitation  and RV dysfunction by prior echocardiogram      Non-rheumatic tricuspid valve insufficiency      Paroxysmal A-fib (H)      Persistent atrial fibrillation with rapid ventricular response      Pigment deposition      Systolic heart failure (H)     Echo 11/2015- EF 35-40%     Tricuspid regurgitation     Mod 2016       PAST SURGICAL HISTORY:  Past Surgical History:   Procedure Laterality Date     ANESTHESIA CARDIOVERSION N/A 12/6/2016    Procedure: ANESTHESIA CARDIOVERSION;  Surgeon: GENERIC ANESTHESIA PROVIDER;  Location: [facility] OR     ANESTHESIA CARDIOVERSION N/A 6/4/2018    Procedure: ANESTHESIA CARDIOVERSION;  ANESTHESIA CARDIOVERSION ;  Surgeon: GENERIC ANESTHESIA PROVIDER;  Location: [facility] OR     ANESTHESIA CARDIOVERSION N/A 9/25/2018    Procedure: ANESTHESIA CARDIOVERSION;  ANESTHESIA CARDIOVERSION ;  Surgeon: GENERIC ANESTHESIA PROVIDER;  Location: [facility] OR     C KP (TRANSESOPHAGEAL ECHO)  12/6/16     CARDIOVERSION  12/14/15     H ABLATION ATRIAL FLUTTER  3/21/16     ORTHOPEDIC SURGERY Left 1957    foot injury repair       FAMILY HISTORY:  Family History   Problem Relation Age of Onset     C.A.D. Mother      Prostate Cancer Father 73       SOCIAL HISTORY:  Social History     Socioeconomic History     Marital status:      Spouse name: None     Number of children: None     Years of education: None     Highest education level: None   Occupational History     None   Social Needs     Financial resource strain: None     Food insecurity:     Worry: None     Inability: None     Transportation needs:     Medical: None     Non-medical: None   Tobacco Use     Smoking status: Never Smoker     Smokeless tobacco: Never Used   Substance and Sexual Activity     Alcohol use: Yes     Comment: rare     Drug use: No     Sexual activity: Not Currently   Lifestyle     Physical activity:     Days per week: None     Minutes per session: None     Stress: None   Relationships     Social connections:     Talks on phone: None     Gets  "together: None     Attends [employer] service: None     Active member of club or organization: None     Attends meetings of clubs or organizations: None     Relationship status: None     Intimate partner violence:     Fear of current or ex partner: None     Emotionally abused: None     Physically abused: None     Forced sexual activity: None   Other Topics Concern     Parent/sibling w/ CABG, MI or angioplasty before 65F 55M? Not Asked      Service Not Asked     Blood Transfusions Not Asked     Caffeine Concern No     Occupational Exposure No     Comment: none     Hobby Hazards Not Asked     Sleep Concern No     Stress Concern No     Weight Concern No     Special Diet Yes     Comment: low salt     Back Care Not Asked     Exercise Yes     Comment: walking     Bike Helmet Not Asked     Seat Belt Yes     Self-Exams Not Asked   Social History Narrative     None       Review of Systems:  Skin:  Negative     Eyes:  Positive for glasses  ENT:  Negative    Respiratory:  Negative    Cardiovascular:  Negative Positive for;edema  Gastroenterology: Negative    Genitourinary:  Negative prostate problem;nocturia  Musculoskeletal:  Positive for joint pain  Neurologic:  Negative    Psychiatric:  Negative    Heme/Lymph/Imm:  Negative easy bruising  Endocrine:  Negative thyroid disorder    Physical Exam:  Vitals: BP 96/58 (BP Location: Right arm, Patient Position: Sitting, Cuff Size: Adult Regular)   Pulse 68   Ht 1.727 m (5' 7.99\")   Wt 67 kg (147 lb 11.2 oz)   SpO2 97%   BMI 22.46 kg/m      Recent Lab Results:  LIPID RESULTS:  Lab Results   Component Value Date    CHOL 141 10/16/2018    HDL 61 10/16/2018    LDL 70 10/16/2018    TRIG 52 10/16/2018       LIVER ENZYME RESULTS:  Lab Results   Component Value Date    AST 32 11/27/2019    ALT 23 11/27/2019       CBC RESULTS:  Lab Results   Component Value Date    WBC 5.1 09/20/2018    WBC 4.4 02/17/2018    RBC 5.20 09/20/2018    RBC 4.44 02/17/2018    HGB 16.6 04/11/2019    " HCT 48.6 09/20/2018    MCV 93.5 09/20/2018    MCH 30.8 09/20/2018    MCHC 32.9 (A) 09/20/2018    RDW 14.6 02/17/2018     09/20/2018       BMP RESULTS:  Lab Results   Component Value Date     11/27/2019    POTASSIUM 4.4 11/27/2019    CHLORIDE 106 11/27/2019    CO2 28 11/27/2019    ANIONGAP 5 11/27/2019    GLC 93 11/27/2019    BUN 41 (H) 11/27/2019    CR 2.05 (H) 11/27/2019    GFRESTIMATED 31 (L) 11/27/2019    GFRESTBLACK 35 (L) 11/27/2019    TAYLOR 9.1 11/27/2019        A1C RESULTS:  Lab Results   Component Value Date    A1C 6.0 03/31/2016       INR RESULTS:  Lab Results   Component Value Date    INR 1.16 (H) 11/17/2017    INR 1.80 (H) 03/21/2016           CC  JESSICA Berg  [facility] HE[facility] [address] [address]

## 2019-12-10 ENCOUNTER — TRANSFERRED RECORDS (OUTPATIENT)
Dept: HEALTH INFORMATION MANAGEMENT | Facility: CLINIC | Age: 74
End: 2019-12-10

## 2019-12-18 ENCOUNTER — TRANSFERRED RECORDS (OUTPATIENT)
Dept: FAMILY MEDICINE | Facility: CLINIC | Age: 74
End: 2019-12-18

## 2020-02-10 ENCOUNTER — HEALTH MAINTENANCE LETTER (OUTPATIENT)
Age: 75
End: 2020-02-10

## 2020-03-11 ENCOUNTER — MEDICAL CORRESPONDENCE (OUTPATIENT)
Dept: FAMILY MEDICINE | Facility: CLINIC | Age: 75
End: 2020-03-11

## 2020-04-01 DIAGNOSIS — I36.1 NON-RHEUMATIC TRICUSPID VALVE INSUFFICIENCY: ICD-10-CM

## 2020-04-01 DIAGNOSIS — I50.20 SYSTOLIC CONGESTIVE HEART FAILURE, UNSPECIFIED HF CHRONICITY (H): ICD-10-CM

## 2020-04-01 RX ORDER — ISOSORBIDE MONONITRATE 30 MG/1
30 TABLET, EXTENDED RELEASE ORAL DAILY
Qty: 90 TABLET | Refills: 1 | Status: SHIPPED | OUTPATIENT
Start: 2020-04-01

## 2020-07-30 ENCOUNTER — TELEPHONE (OUTPATIENT)
Dept: CARDIOLOGY | Facility: CLINIC | Age: 75
End: 2020-07-30

## 2020-07-30 NOTE — TELEPHONE ENCOUNTER
7/30/20 Msg received from refill line:  Hello, received a refill request for Amiodarone, but is overdue for EKG. LOV 11/27/19. Can you look at this?   Attempted to call pt for more information but line busy. Will attempt another time.  Lashay 4 pm

## 2020-08-03 NOTE — TELEPHONE ENCOUNTER
Spoke to patient regarding need for visit as he was overdue.  Patient informed this writer that he does his cardiology care in the [facility].  He no longer follows in the [facility] system.  Patient will call his pharmacy and update his profile to reflect correct cardiology team.  CLIFFORD Ball

## 2020-11-16 ENCOUNTER — HEALTH MAINTENANCE LETTER (OUTPATIENT)
Age: 75
End: 2020-11-16

## 2020-11-27 DIAGNOSIS — I50.20 SYSTOLIC CONGESTIVE HEART FAILURE, UNSPECIFIED HF CHRONICITY (H): ICD-10-CM

## 2020-11-27 DIAGNOSIS — I36.1 NON-RHEUMATIC TRICUSPID VALVE INSUFFICIENCY: ICD-10-CM

## 2020-12-01 RX ORDER — ISOSORBIDE MONONITRATE 30 MG/1
TABLET, EXTENDED RELEASE ORAL
Qty: 30 TABLET | Refills: 2 | OUTPATIENT
Start: 2020-12-01

## 2020-12-07 ENCOUNTER — TRANSFERRED RECORDS (OUTPATIENT)
Dept: HEALTH INFORMATION MANAGEMENT | Facility: CLINIC | Age: 75
End: 2020-12-07

## 2020-12-08 LAB — EJECTION FRACTION: NORMAL %

## 2020-12-17 ENCOUNTER — HOSPITAL ENCOUNTER (OUTPATIENT)
Dept: CARDIAC REHAB | Facility: CLINIC | Age: 75
End: 2020-12-17
Payer: COMMERCIAL

## 2020-12-17 PROCEDURE — 93797 PHYS/QHP OP CAR RHAB WO ECG: CPT | Mod: 59

## 2020-12-17 PROCEDURE — 93798 PHYS/QHP OP CAR RHAB W/ECG: CPT

## 2020-12-17 PROCEDURE — 999N000109 HC STATISTIC OP CR VISIT

## 2020-12-17 PROCEDURE — 999N000108 HC STATISTIC OP CARDIAC VISIT #2

## 2020-12-24 ENCOUNTER — HOSPITAL ENCOUNTER (OUTPATIENT)
Dept: CARDIAC REHAB | Facility: CLINIC | Age: 75
End: 2020-12-24
Attending: FAMILY MEDICINE
Payer: COMMERCIAL

## 2020-12-24 PROCEDURE — 999N000109 HC STATISTIC OP CR VISIT: Performed by: REHABILITATION PRACTITIONER

## 2020-12-28 ENCOUNTER — HOSPITAL ENCOUNTER (OUTPATIENT)
Facility: CLINIC | Age: 75
Setting detail: SPECIMEN
Discharge: HOME | End: 2020-12-28
Attending: INTERNAL MEDICINE | Admitting: INTERNAL MEDICINE
Payer: COMMERCIAL

## 2020-12-28 ENCOUNTER — INFUSION THERAPY VISIT (OUTPATIENT)
Dept: INFUSION THERAPY | Facility: CLINIC | Age: 75
End: 2020-12-28
Attending: INTERNAL MEDICINE
Payer: COMMERCIAL

## 2020-12-28 ENCOUNTER — THERAPY VISIT (OUTPATIENT)
Dept: PHYSICAL THERAPY | Facility: CLINIC | Age: 75
End: 2020-12-28
Payer: COMMERCIAL

## 2020-12-28 DIAGNOSIS — I50.22 CHRONIC SYSTOLIC CONGESTIVE HEART FAILURE (H): ICD-10-CM

## 2020-12-28 DIAGNOSIS — M25.572 ARTHRALGIA OF LEFT FOOT: ICD-10-CM

## 2020-12-28 LAB
ANION GAP SERPL CALCULATED.3IONS-SCNC: 1 MMOL/L (ref 3–14)
BUN SERPL-MCNC: 42 MG/DL (ref 7–30)
CALCIUM SERPL-MCNC: 8.2 MG/DL (ref 8.5–10.1)
CHLORIDE SERPL-SCNC: 105 MMOL/L (ref 94–109)
CO2 SERPL-SCNC: 33 MMOL/L (ref 20–32)
CREAT SERPL-MCNC: 2 MG/DL (ref 0.66–1.25)
GFR SERPL CREATININE-BSD FRML MDRD: 31 ML/MIN/{1.73_M2}
GLUCOSE SERPL-MCNC: 84 MG/DL (ref 70–99)
POTASSIUM SERPL-SCNC: 3.9 MMOL/L (ref 3.4–5.3)
SODIUM SERPL-SCNC: 139 MMOL/L (ref 133–144)

## 2020-12-28 PROCEDURE — 97110 THERAPEUTIC EXERCISES: CPT | Mod: GP | Performed by: PHYSICAL THERAPIST

## 2020-12-28 PROCEDURE — 97161 PT EVAL LOW COMPLEX 20 MIN: CPT | Mod: GP | Performed by: PHYSICAL THERAPIST

## 2020-12-28 PROCEDURE — 80048 BASIC METABOLIC PNL TOTAL CA: CPT | Performed by: INTERNAL MEDICINE

## 2020-12-28 PROCEDURE — 36592 COLLECT BLOOD FROM PICC: CPT

## 2020-12-28 NOTE — PROGRESS NOTES
Infusion Nursing Note:  Misael Fischer presents today for dressing change and labs.    Patient seen by provider today: No   present during visit today: Not Applicable.    Note: Upon arrival patient had an infusion pump attached to PICC line infusing a cardiac medication per his report.  Changed dressing per protocol.  Labs drawn from peripheral site.    Intravenous Access:  PICC.    Treatment Conditions:  Not Applicable.      Post Infusion Assessment:  Patient tolerated dressing change without incident.       Discharge Plan:   Patient discharged in stable condition accompanied by: self.  Departure Mode: Ambulatory.    Tamra Lima RN

## 2020-12-28 NOTE — PROGRESS NOTES
[facility] Initial Evaluation  Subjective:  The history is provided by the patient. No  was used.   Patient Health History  Misael Fischer being seen for Foot pain.     Date of Onset: 2 years.   Problem occurred: unknown   Pain is reported as 4/10 on pain scale.  General health as reported by patient is fair.  Pertinent medical history includes: heart problems.   Red flags:  None as reported by patient.  Medical allergies: other. Other medical allergies details: See EPIC.   Surgeries include:  Heart surgery (tricuspid repair).    Current medications:  Cardiac medication.    Current occupation is Retired.   Primary job tasks include:  Computer work.   Other job/home tasks details: home duties.                Therapist Generated HPI Evaluation  Problem details: Pt c/o L foot pain x 2 years.  Denies recent injury.  MD order date 12/17/2020.  Pt is currently in cardiac rehab following tricuspid valve repair.  Had multi-tendon injury in 1957 and surgical repair, but feels he may have reinjured on of the tendons many years ago..         Type of problem:  Left foot.    This is a chronic condition.  Condition occurred with:  Insidious onset.  Where condition occurred: for unknown reasons.  Patient reports pain:  Great toe and metatarsal heads.  Pain is described as aching and shooting and is intermittent.  Pain radiates to:  Ankle and lower leg. Pain is the same all the time.  Since onset symptoms are unchanged.  Associated symptoms:  Loss of motion/stiffness, loss of strength and edema (cardiac related swelling). Symptoms are exacerbated by ascending stairs, bending/squatting, standing, weight bearing, walking and descending stairs  and relieved by rest.      Barriers include:  None as reported by patient.                        Objective:  Standing Alignment:                Ankle/Foot:  Pes planus L    Gait:    Gait Type:  Antalgic   Assistive Devices:  None  Deviations:  Ankle:   Push off decr L    Flexibility/Screens:       Lower Extremity:  Decreased left lower extremity flexibility:Gastroc and Soleus            Ankle/Foot Evaluation  ROM:  Arom ankle eval: WFL.Prom wnl ankle: ERT L ankle generally.      Strength wnl ankle: R ankle 5/5, L grossly 5-/5.  LIGAMENT TESTING: normal                  EDEMA: Edema ankle: mild/moderate general swelling L>R lower leg.          MOBILITY TESTING:             First Ray Left: hypomobile      FUNCTIONAL TESTS:           Proprioception:  Stork Balance Test: Left: 5sec  Right: 15sec                                                     General     ROS    Assessment/Plan:    Patient is a 75 year old male with left side ankle complaints.    Patient has the following significant findings with corresponding treatment plan.                Diagnosis 1:  L foot pain  Pain -  hot/cold therapy and home program  Decreased ROM/flexibility - manual therapy and therapeutic exercise  Decreased joint mobility - manual therapy and therapeutic exercise  Decreased strength - therapeutic exercise and therapeutic activities  Decreased proprioception - neuro re-education and therapeutic activities  Impaired gait - gait training  Impaired muscle performance - neuro re-education  Decreased function - therapeutic activities    Therapy Evaluation Codes:   Cumulative Therapy Evaluation is: Low complexity.    Previous and current functional limitations:  (See Goal Flow Sheet for this information)    Short term and Long term goals: (See Goal Flow Sheet for this information)     Communication ability:  Patient appears to be able to clearly communicate and understand verbal and written communication and follow directions correctly.  Treatment Explanation - The following has been discussed with the patient:   RX ordered/plan of care  Anticipated outcomes  Possible risks and side effects  This patient would benefit from PT intervention to resume normal activities.   Rehab potential is  good.    Frequency:  1 X week, once daily  Duration:  for 8 weeks  Discharge Plan:  Achieve all LTG.  Independent in home treatment program.  Reach maximal therapeutic benefit.    Please refer to the daily flowsheet for treatment today, total treatment time and time spent performing 1:1 timed codes.

## 2020-12-28 NOTE — LETTER
[facility] PHYSICAL THERAPY  [address]  [facility] [address]  [phone]    2020    Re: Misael Fischer   :   1945  MRN:  [id number]   REFERRING PHYSICIAN:   Kareem Swanson    [facility] PHYSICAL THERAPY  Date of Initial Evaluation:  20  Visits:  Rxs Used: 1  Reason for Referral:  Arthralgia of left foot    EVALUATION SUMMARY    [facility] Initial Evaluation  Subjective:  The history is provided by the patient. No  was used.   Patient Health History  Misael Fischer being seen for Foot pain.   Date of Onset: 2 years.   Problem occurred: unknown   Pain is reported as 4/10 on pain scale.  General health as reported by patient is fair.  Pertinent medical history includes: heart problems.   Red flags:  None as reported by patient.  Medical allergies: other. Other medical allergies details: See EPIC.   Surgeries include:  Heart surgery (tricuspid repair).    Current medications:  Cardiac medication.    Current occupation is Retired.   Primary job tasks include:  Computer work.   Other job/home tasks details: home duties.                Therapist Generated HPI Evaluation  Problem details: Pt c/o L foot pain x 2 years.  Denies recent injury.  MD order date 2020.  Pt is currently in cardiac rehab following tricuspid valve repair.  Had multi-tendon injury in 1957 and surgical repair, but feels he may have reinjured on of the tendons many years ago..         Type of problem:  Left foot.  This is a chronic condition.  Condition occurred with:  Insidious onset.  Where condition occurred: for unknown reasons.  Patient reports pain:  Great toe and metatarsal heads.  Pain is described as aching and shooting and is intermittent.  Pain radiates to:  Ankle and lower leg. Pain is the same all the time.  Since onset symptoms are unchanged.  Associated symptoms:  Loss  of motion/stiffness, loss of strength and edema (cardiac related swelling). Symptoms are exacerbated by ascending stairs, bending/squatting, standing, weight bearing, walking and descending stairs  and relieved by rest.  Barriers include:  None as reported by patient.                       Re: Misael Fischer   :   1945      Objective:  Standing Alignment:    Ankle/Foot:  Pes planus L  Gait:    Gait Type:  Antalgic   Assistive Devices:  None  Deviations:  Ankle:  Push off decr L  Flexibility/Screens:   Lower Extremity:  Decreased left lower extremity flexibility:Gastroc and Soleus    Ankle/Foot Evaluation  ROM:  Arom ankle eval: WFL.Prom wnl ankle: ERT L ankle generally.  Strength wnl ankle: R ankle 5/5, L grossly 5-/5.  LIGAMENT TESTING: normal  EDEMA: Edema ankle: mild/moderate general swelling L>R lower leg.  MOBILITY TESTING:   First Ray Left: hypomobile      FUNCTIONAL TESTS:   Proprioception:  [employer] Balance Test: Left: 5sec  Right: 15sec     Assessment/Plan:    Patient is a 75 year old male with left side ankle complaints.    Patient has the following significant findings with corresponding treatment plan.                Diagnosis 1:  L foot pain  Pain -  hot/cold therapy and home program  Decreased ROM/flexibility - manual therapy and therapeutic exercise  Decreased joint mobility - manual therapy and therapeutic exercise  Decreased strength - therapeutic exercise and therapeutic activities  Decreased proprioception - neuro re-education and therapeutic activities  Impaired gait - gait training  Impaired muscle performance - neuro re-education  Decreased function - therapeutic activities  Therapy Evaluation Codes: Cumulative Therapy Evaluation is: Low complexity.  Previous and current functional limitations:  (See Goal Flow Sheet for this information)    Short term and Long term goals: (See Goal Flow Sheet for this information)   Communication ability:  Patient appears to be able to clearly communicate  and understand verbal and written communication and follow directions correctly.  Treatment Explanation - The following has been discussed with the patient:   RX ordered/plan of care  This patient would benefit from PT intervention to resume normal activities.   Rehab potential is good.  Frequency:  1 X week, once daily  Duration:  for 8 weeks  Discharge Plan:  Achieve all LTG.  Independent in home treatment program.  Reach maximal therapeutic benefit.    Thank you for your referral.    INQUIRIES  Therapist: Aurelio Wilkinson, [facility]   [facility] [place] PHYSICAL THERAP[facility]  [address] [place] [id number]  Phone: [phone]  Fax: [phone]

## 2020-12-30 ENCOUNTER — HOSPITAL ENCOUNTER (OUTPATIENT)
Dept: CARDIAC REHAB | Facility: CLINIC | Age: 75
End: 2020-12-30
Attending: FAMILY MEDICINE
Payer: COMMERCIAL

## 2020-12-30 PROCEDURE — 999N000109 HC STATISTIC OP CR VISIT: Performed by: REHABILITATION PRACTITIONER

## 2020-12-30 PROCEDURE — 93798 PHYS/QHP OP CAR RHAB W/ECG: CPT | Performed by: REHABILITATION PRACTITIONER

## 2021-01-04 ENCOUNTER — CARE COORDINATION (OUTPATIENT)
Dept: CARDIOLOGY | Facility: CLINIC | Age: 76
End: 2021-01-04

## 2021-01-04 NOTE — PROGRESS NOTES
Recent lab results noted in Dr. Snow's inbasket. Noted the patient now follows with [facility], see encounter below. Discontinued future orders per Dr. Snow.       Bernadette Laurent, RN   Registered Nurse      Telephone Encounter   Addendum   Encounter Date:  7/30/2020               Addendum             []Hide copied text    []Silvestrever for details  Spoke to patient regarding need for visit as he was overdue.  Patient informed this writer that he does his cardiology care in the [facility].  He no longer follows in the [facility] system.  Patient will call his pharmacy and update his profile to reflect correct cardiology team.  CLIFFORD Ball

## 2021-04-03 ENCOUNTER — HEALTH MAINTENANCE LETTER (OUTPATIENT)
Age: 76
End: 2021-04-03

## 2021-04-07 DIAGNOSIS — I36.1 NON-RHEUMATIC TRICUSPID VALVE INSUFFICIENCY: ICD-10-CM

## 2021-04-07 NOTE — TELEPHONE ENCOUNTER
torsemide (DEMADEX) 20 MG tablet   Last Written Prescription Date:  6/27/2019  Last Fill Quantity: 90,   # refills: 3  Last Office Visit : 11/27/2019  Future Office visit:  None    Routing refill request to provider for review/approval because:  Gap in Visits and refills 2019??     Refer to clinic for review       Belen Kunz RN  Central Triage Red Flags/Med Refills

## 2021-04-08 RX ORDER — TORSEMIDE 20 MG/1
TABLET ORAL
Qty: 90 TABLET | Refills: 0 | OUTPATIENT
Start: 2021-04-08

## 2021-04-08 NOTE — TELEPHONE ENCOUNTER
Patient is now under the care of Hospice. Please route any medication requests through them.    Valencia Johnson, RN  Care Coordinator  [facility] CThereseOJERRY   [email or web address]. Nurse Line: [phone]  / Personal Line: [phone]  04/08/21 6:53 AM

## 2021-04-12 ENCOUNTER — APPOINTMENT (OUTPATIENT)
Dept: GENERAL RADIOLOGY | Facility: CLINIC | Age: 76
End: 2021-04-12
Attending: EMERGENCY MEDICINE
Payer: COMMERCIAL

## 2021-04-12 ENCOUNTER — HOSPITAL ENCOUNTER (EMERGENCY)
Facility: CLINIC | Age: 76
Discharge: HOME | End: 2021-04-13
Attending: EMERGENCY MEDICINE | Admitting: EMERGENCY MEDICINE
Payer: COMMERCIAL

## 2021-04-12 DIAGNOSIS — E87.5 HYPERKALEMIA: ICD-10-CM

## 2021-04-12 DIAGNOSIS — N18.9 ACUTE RENAL FAILURE SUPERIMPOSED ON CHRONIC KIDNEY DISEASE, UNSPECIFIED CKD STAGE, UNSPECIFIED ACUTE RENAL FAILURE TYPE: ICD-10-CM

## 2021-04-12 DIAGNOSIS — N17.9 ACUTE RENAL FAILURE SUPERIMPOSED ON CHRONIC KIDNEY DISEASE, UNSPECIFIED CKD STAGE, UNSPECIFIED ACUTE RENAL FAILURE TYPE: ICD-10-CM

## 2021-04-12 DIAGNOSIS — I42.9 CARDIOMYOPATHY, UNSPECIFIED TYPE (H): ICD-10-CM

## 2021-04-12 PROCEDURE — 83735 ASSAY OF MAGNESIUM: CPT | Performed by: EMERGENCY MEDICINE

## 2021-04-12 PROCEDURE — 99285 EMERGENCY DEPT VISIT HI MDM: CPT | Mod: 25

## 2021-04-12 PROCEDURE — 80048 BASIC METABOLIC PNL TOTAL CA: CPT | Performed by: EMERGENCY MEDICINE

## 2021-04-12 PROCEDURE — 84145 PROCALCITONIN (PCT): CPT | Performed by: EMERGENCY MEDICINE

## 2021-04-12 PROCEDURE — 71045 X-RAY EXAM CHEST 1 VIEW: CPT

## 2021-04-12 PROCEDURE — 83880 ASSAY OF NATRIURETIC PEPTIDE: CPT | Performed by: EMERGENCY MEDICINE

## 2021-04-12 PROCEDURE — 93005 ELECTROCARDIOGRAM TRACING: CPT

## 2021-04-12 PROCEDURE — C9803 HOPD COVID-19 SPEC COLLECT: HCPCS

## 2021-04-12 ASSESSMENT — ENCOUNTER SYMPTOMS
CHILLS: 1
FATIGUE: 1
SHORTNESS OF BREATH: 1

## 2021-04-13 VITALS
HEART RATE: 80 BPM | RESPIRATION RATE: 15 BRPM | OXYGEN SATURATION: 97 % | SYSTOLIC BLOOD PRESSURE: 80 MMHG | DIASTOLIC BLOOD PRESSURE: 59 MMHG | TEMPERATURE: 97.7 F

## 2021-04-13 LAB
ALBUMIN UR-MCNC: NEGATIVE MG/DL
ANION GAP SERPL CALCULATED.3IONS-SCNC: 8 MMOL/L (ref 3–14)
ANION GAP SERPL CALCULATED.3IONS-SCNC: NORMAL MMOL/L (ref 3–14)
APPEARANCE UR: CLEAR
BACTERIA #/AREA URNS HPF: ABNORMAL /HPF
BASE DEFICIT BLDV-SCNC: 3.9 MMOL/L
BASOPHILS # BLD AUTO: 0 10E9/L (ref 0–0.2)
BASOPHILS NFR BLD AUTO: 0.6 %
BILIRUB UR QL STRIP: NEGATIVE
BUN SERPL-MCNC: 110 MG/DL (ref 7–30)
BUN SERPL-MCNC: NORMAL MG/DL (ref 7–30)
CALCIUM SERPL-MCNC: 8.7 MG/DL (ref 8.5–10.1)
CALCIUM SERPL-MCNC: NORMAL MG/DL (ref 8.5–10.1)
CHLORIDE SERPL-SCNC: 100 MMOL/L (ref 94–109)
CHLORIDE SERPL-SCNC: NORMAL MMOL/L (ref 94–109)
CO2 SERPL-SCNC: 23 MMOL/L (ref 20–32)
CO2 SERPL-SCNC: NORMAL MMOL/L (ref 20–32)
COLOR UR AUTO: ABNORMAL
CREAT SERPL-MCNC: 4.65 MG/DL (ref 0.66–1.25)
CREAT SERPL-MCNC: NORMAL MG/DL (ref 0.66–1.25)
DIFFERENTIAL METHOD BLD: ABNORMAL
EOSINOPHIL # BLD AUTO: 0.1 10E9/L (ref 0–0.7)
EOSINOPHIL NFR BLD AUTO: 2.1 %
ERYTHROCYTE [DISTWIDTH] IN BLOOD BY AUTOMATED COUNT: 15.9 % (ref 10–15)
GFR SERPL CREATININE-BSD FRML MDRD: 11 ML/MIN/{1.73_M2}
GLUCOSE BLDC GLUCOMTR-MCNC: 112 MG/DL (ref 70–99)
GLUCOSE BLDC GLUCOMTR-MCNC: 113 MG/DL (ref 70–99)
GLUCOSE BLDC GLUCOMTR-MCNC: 142 MG/DL (ref 70–99)
GLUCOSE BLDC GLUCOMTR-MCNC: 145 MG/DL (ref 70–99)
GLUCOSE SERPL-MCNC: 106 MG/DL (ref 70–99)
GLUCOSE SERPL-MCNC: 119 MG/DL (ref 70–99)
GLUCOSE SERPL-MCNC: NORMAL MG/DL (ref 70–99)
GLUCOSE UR STRIP-MCNC: NEGATIVE MG/DL
HCO3 BLDV-SCNC: 22 MMOL/L (ref 21–28)
HCT VFR BLD AUTO: 52.4 % (ref 40–53)
HGB BLD-MCNC: 17 G/DL (ref 13.3–17.7)
HGB UR QL STRIP: NEGATIVE
HYALINE CASTS #/AREA URNS LPF: 38 /LPF (ref 0–2)
IMM GRANULOCYTES # BLD: 0 10E9/L (ref 0–0.4)
IMM GRANULOCYTES NFR BLD: 0.5 %
INTERPRETATION ECG - MUSE: NORMAL
KETONES UR STRIP-MCNC: NEGATIVE MG/DL
LABORATORY COMMENT REPORT: NORMAL
LEUKOCYTE ESTERASE UR QL STRIP: NEGATIVE
LYMPHOCYTES # BLD AUTO: 0.4 10E9/L (ref 0.8–5.3)
LYMPHOCYTES NFR BLD AUTO: 6.3 %
MAGNESIUM SERPL-MCNC: 3.2 MG/DL (ref 1.6–2.3)
MAGNESIUM SERPL-MCNC: NORMAL MG/DL (ref 1.6–2.3)
MCH RBC QN AUTO: 31 PG (ref 26.5–33)
MCHC RBC AUTO-ENTMCNC: 32.4 G/DL (ref 31.5–36.5)
MCV RBC AUTO: 95 FL (ref 78–100)
MONOCYTES # BLD AUTO: 0.6 10E9/L (ref 0–1.3)
MONOCYTES NFR BLD AUTO: 8.8 %
MUCOUS THREADS #/AREA URNS LPF: PRESENT /LPF
NEUTROPHILS # BLD AUTO: 5.4 10E9/L (ref 1.6–8.3)
NEUTROPHILS NFR BLD AUTO: 81.7 %
NITRATE UR QL: NEGATIVE
NRBC # BLD AUTO: 0 10*3/UL
NRBC BLD AUTO-RTO: 0 /100
NT-PROBNP SERPL-MCNC: ABNORMAL PG/ML (ref 0–1800)
NT-PROBNP SERPL-MCNC: NORMAL PG/ML (ref 0–1800)
O2/TOTAL GAS SETTING VFR VENT: NORMAL %
OXYHGB MFR BLDV: 56 %
PCO2 BLDV: 41 MM HG (ref 40–50)
PH BLDV: 7.33 PH (ref 7.32–7.43)
PH UR STRIP: 6 PH (ref 5–7)
PLATELET # BLD AUTO: 200 10E9/L (ref 150–450)
PO2 BLDV: 32 MM HG (ref 25–47)
POTASSIUM SERPL-SCNC: 5.9 MMOL/L (ref 3.4–5.3)
POTASSIUM SERPL-SCNC: 6.7 MMOL/L (ref 3.4–5.3)
POTASSIUM SERPL-SCNC: NORMAL MMOL/L (ref 3.4–5.3)
PROCALCITONIN SERPL-MCNC: 0.55 NG/ML
PROCALCITONIN SERPL-MCNC: NORMAL NG/ML
RBC # BLD AUTO: 5.49 10E12/L (ref 4.4–5.9)
RBC #/AREA URNS AUTO: <1 /HPF (ref 0–2)
SARS-COV-2 RNA RESP QL NAA+PROBE: NEGATIVE
SODIUM SERPL-SCNC: 131 MMOL/L (ref 133–144)
SODIUM SERPL-SCNC: NORMAL MMOL/L (ref 133–144)
SOURCE: ABNORMAL
SP GR UR STRIP: 1.01 (ref 1–1.03)
SPECIMEN SOURCE: NORMAL
SQUAMOUS #/AREA URNS AUTO: <1 /HPF (ref 0–1)
UROBILINOGEN UR STRIP-MCNC: NORMAL MG/DL (ref 0–2)
WBC # BLD AUTO: 6.6 10E9/L (ref 4–11)
WBC #/AREA URNS AUTO: <1 /HPF (ref 0–5)

## 2021-04-13 PROCEDURE — 999N001017 HC STATISTIC GLUCOSE BY METER IP

## 2021-04-13 PROCEDURE — 84132 ASSAY OF SERUM POTASSIUM: CPT | Performed by: EMERGENCY MEDICINE

## 2021-04-13 PROCEDURE — 82947 ASSAY GLUCOSE BLOOD QUANT: CPT | Performed by: EMERGENCY MEDICINE

## 2021-04-13 PROCEDURE — 83880 ASSAY OF NATRIURETIC PEPTIDE: CPT | Performed by: EMERGENCY MEDICINE

## 2021-04-13 PROCEDURE — 96361 HYDRATE IV INFUSION ADD-ON: CPT

## 2021-04-13 PROCEDURE — 84145 PROCALCITONIN (PCT): CPT | Performed by: EMERGENCY MEDICINE

## 2021-04-13 PROCEDURE — 82805 BLOOD GASES W/O2 SATURATION: CPT | Performed by: EMERGENCY MEDICINE

## 2021-04-13 PROCEDURE — 83735 ASSAY OF MAGNESIUM: CPT | Performed by: EMERGENCY MEDICINE

## 2021-04-13 PROCEDURE — 81001 URINALYSIS AUTO W/SCOPE: CPT | Performed by: EMERGENCY MEDICINE

## 2021-04-13 PROCEDURE — 250N000009 HC RX 250: Performed by: EMERGENCY MEDICINE

## 2021-04-13 PROCEDURE — 250N000013 HC RX MED GY IP 250 OP 250 PS 637: Performed by: EMERGENCY MEDICINE

## 2021-04-13 PROCEDURE — 96375 TX/PRO/DX INJ NEW DRUG ADDON: CPT

## 2021-04-13 PROCEDURE — 250N000011 HC RX IP 250 OP 636: Performed by: EMERGENCY MEDICINE

## 2021-04-13 PROCEDURE — 80048 BASIC METABOLIC PNL TOTAL CA: CPT | Performed by: EMERGENCY MEDICINE

## 2021-04-13 PROCEDURE — 36415 COLL VENOUS BLD VENIPUNCTURE: CPT | Performed by: EMERGENCY MEDICINE

## 2021-04-13 PROCEDURE — 96374 THER/PROPH/DIAG INJ IV PUSH: CPT

## 2021-04-13 PROCEDURE — 258N000001 HC RX 258: Performed by: EMERGENCY MEDICINE

## 2021-04-13 PROCEDURE — 87635 SARS-COV-2 COVID-19 AMP PRB: CPT | Performed by: EMERGENCY MEDICINE

## 2021-04-13 PROCEDURE — 94640 AIRWAY INHALATION TREATMENT: CPT

## 2021-04-13 PROCEDURE — 85025 COMPLETE CBC W/AUTO DIFF WBC: CPT | Performed by: EMERGENCY MEDICINE

## 2021-04-13 RX ORDER — DEXTROSE MONOHYDRATE 25 G/50ML
25 INJECTION, SOLUTION INTRAVENOUS ONCE
Status: COMPLETED | OUTPATIENT
Start: 2021-04-13 | End: 2021-04-13

## 2021-04-13 RX ORDER — DEXTROSE MONOHYDRATE 100 MG/ML
INJECTION, SOLUTION INTRAVENOUS CONTINUOUS
Status: DISCONTINUED | OUTPATIENT
Start: 2021-04-13 | End: 2021-04-13 | Stop reason: HOSPADM

## 2021-04-13 RX ORDER — ALBUTEROL SULFATE 5 MG/ML
10 SOLUTION RESPIRATORY (INHALATION) ONCE
Status: COMPLETED | OUTPATIENT
Start: 2021-04-13 | End: 2021-04-13

## 2021-04-13 RX ORDER — FUROSEMIDE 10 MG/ML
60 INJECTION INTRAMUSCULAR; INTRAVENOUS ONCE
Status: COMPLETED | OUTPATIENT
Start: 2021-04-13 | End: 2021-04-13

## 2021-04-13 RX ORDER — NICOTINE POLACRILEX 4 MG
15-30 LOZENGE BUCCAL
Status: DISCONTINUED | OUTPATIENT
Start: 2021-04-13 | End: 2021-04-13 | Stop reason: HOSPADM

## 2021-04-13 RX ORDER — DEXTROSE MONOHYDRATE 25 G/50ML
25-50 INJECTION, SOLUTION INTRAVENOUS
Status: DISCONTINUED | OUTPATIENT
Start: 2021-04-13 | End: 2021-04-13 | Stop reason: HOSPADM

## 2021-04-13 RX ADMIN — SODIUM BICARBONATE 50 MEQ: 84 INJECTION INTRAVENOUS at 01:20

## 2021-04-13 RX ADMIN — DEXTROSE MONOHYDRATE: 100 INJECTION, SOLUTION INTRAVENOUS at 01:33

## 2021-04-13 RX ADMIN — ALBUTEROL SULFATE 5 MG: 2.5 SOLUTION RESPIRATORY (INHALATION) at 01:44

## 2021-04-13 RX ADMIN — ALBUTEROL SULFATE 5 MG: 2.5 SOLUTION RESPIRATORY (INHALATION) at 00:39

## 2021-04-13 RX ADMIN — FUROSEMIDE 60 MG: 10 INJECTION, SOLUTION INTRAMUSCULAR; INTRAVENOUS at 01:27

## 2021-04-13 RX ADMIN — DEXTROSE MONOHYDRATE 25 G: 25 INJECTION, SOLUTION INTRAVENOUS at 01:11

## 2021-04-13 RX ADMIN — HUMAN INSULIN 5 UNITS: 100 INJECTION, SOLUTION SUBCUTANEOUS at 01:12

## 2021-04-13 NOTE — DISCHARGE INSTRUCTIONS
Hospice meeting later today to discuss goals of care and treatment going forward  Your potassium is still high and at risk of it getting worse so would hold the potassium supplements, continue torsemide for now. Continue oral fluids  You are at risk of your heart having an abnormal heart rhythm from the potassium being high

## 2021-04-13 NOTE — ED TRIAGE NOTES
Pt states sent her for evaluation of hyperkalemia, states potassium 6.7 from home health visit earlier today. ABCs intact GCS 15

## 2021-04-13 NOTE — ED NOTES
I received a call from lab regarding this patient seen by Dr. Marin earlier today and discharged home with plans to initiate hospice.  Procalcitonin moderately elevated.  Per review of notes admission was recommended but patient wanted to go home to spend what time he had left with his family and declined admission for further treatment and evaluation.  It seems as though the primary concern was for renal failure, CHF, hyperkalemia.  He had no fever and normal WBC.  Would not initiate antibiotics or take any other action at this time based on moderate procalcitonin value alone.     Nicolas Cartwright PA-C  04/13/21 1404

## 2021-04-13 NOTE — ED PROVIDER NOTES
History   Chief Complaint  Abnormal Labs    HPI  Misael Fischer is a 75 year old male with a history of atrial fibrillation s/p ablation on Eliquis, CAD, CKD stage IV, and systolic congestive heart failure on chronic home dobutamine infusion who presents for evaluation of abnormal labs. The patient reports that he had blood drawn today which showed a potassium level of 6.7. The patient also endorses feeling like he has no energy. He notes that he is cold and that breathing has been somewhat difficult. He denies any chest pain. He has been urinating per his normal. Blood pressure is always low per his report.     BMP from earlier today: Na 130 (L),  Potassium 6.7 (HH), CO2 total 18 (L), Calcium 8.3 (L),  (H), Creatinine 4.66 (H), BUN/Creat ratio 22 (H), GFR 12 (L) o/w WNL    Review of Systems   Constitutional: Positive for chills and fatigue.   Respiratory: Positive for shortness of breath.    Cardiovascular: Negative for chest pain.   All other systems reviewed and are negative.    Allergies:  Ace Inhibitors  Pravastatin    Medications:  Amiodarone  Eliquis   Imdur   levothyroxine   torsemide    Past Medical History:    Atrial flutter   AV jade re-entry tachycardia  Benign prostatic hyperplasia  Coronary Artery Disease   Cardiomyopathy   CHF  CKD   Elevated PSA   Hypercholesterolemia   Malaria  Gout   amyloidosis   Cardiogenic shock   Chronotropic incompetence    Mild pulmonic regurgitation and RV dysfunction   Non-rheumatic tricuspid valve insufficiency   Paroxysmal A-fibrillation   Pigment deposition   Systolic heart failure   Tricuspid regurgitation    Past Surgical History:    Cardioversion  Ablation atrial flutter  Orthopedic surgery; foot  Transesophageal echo    Family History:    Mother: Coronary Artery Disease  Father: prostate cancer    Social History:  Presents to the ED alone.   PCP: Kevan Bull      Physical Exam     Patient Vitals for the past 24 hrs:   BP Temp Temp src Pulse Resp SpO2    04/13/21 0240 (!) 80/59 -- -- 80 15 97 %   04/13/21 0230 (!) 82/62 -- -- 80 (!) 31 98 %   04/13/21 0220 (!) 84/62 -- -- 82 (!) 31 100 %   04/13/21 0210 (!) 85/66 -- -- 80 (!) 33 --   04/13/21 0042 -- -- -- 80 (!) 35 99 %   04/12/21 2342 (!) 84/63 -- -- 68 -- --   04/12/21 2300 (!) 75/61 -- -- 83 -- --   04/12/21 2208 (!) 78/58 97.7  F (36.5  C) Oral 94 20 93 %     Physical Exam  General: Sitting up in bed  Eyes:  The pupils are equal and round    Conjunctivae and sclerae are normal  ENT:    Wearing a mask  Neck:  Normal range of motion  CV:  Regular rate, regular rhythm     Skin warm and well perfused   Resp:  Non labored breathing on room air    No tachypnea    No cough heard  GI:  Abdomen is soft, there is no rigidity    No distension    No rebound tenderness     No abdominal tenderness  MS:  PICC line on left arm  Skin:  No rash or acute skin lesions noted  Neuro:   Awake, alert.      Speech is normal and fluent.    Face is symmetric.     Moves all extremities equally  Psych: Normal affect.  Appropriate interactions.    Emergency Department Course   ECG:   ECG taken at 2247, ECG read at 2334  Ventricular-paced rhythm. Abnormal ECG.   Now paced rhythm as compared to prior, dated 3/27/19.   Rate 82 bpm. MO interval * ms. QRS duration 178 ms. QT/QTc 514/600 ms. P-R-T axes * 0 66.    Imaging:  XR Chest Portable 1 View:   1. Small bilateral pleural effusions.   2. A few hazy opacities in bilateral lung bases, adjacent to the effusions, could represent atelectasis or pneumonia. As per radiology.     Laboratory:  CBC with differential: WBC: 6.6, HGB: 17.0, PLT: 200    BMP: Glucose 106 (H), Na 131 (L), Potassium 6.7 (HH), Glucose 106 (H),  (H), Creatinine 4.65 (H), GFR estimate 11 (L), o/w WNL    Magnesium: 3.2 (H)    Potassium (0207): 5.9 (H)    BNP: 12,158 (H)    Procalcitonin: pending    0012 Blood gas venous and oxyhgb: ph 7.33, PCO2 41, PO2 32, bicarbonate 22, FIO2 RA, oxyhemoglobin   56, base excess  3.9    0029 Glucose by meter: 113 (H)  0111 Glucose by meter: 112 (H)  0207 Glucose by meter: 142 (H)  0248 Glucose by meter: 145 (H)    UA with Microscopic: bacteria few (A), mucous present (A), hyaline casts 38 (H), o/w WNL    Asymptomatic COVID19 Virus PCR by nasopharyngeal swab: negative    Emergency Department Course:  Reviewed:  I reviewed nursing notes, vitals, past medical history and care everywhere    Assessments:  2335 I physically examined the patient as documented above.    0149 I rechecked the patient.     0247 I rechecked the patient with the son now at bedside.     Consults:   0146 I consulted with Dr. Reynolds, on call hospitalist, regarding the patient's history and presentation here in the emergency department.    0153 I spoke with Dr. Reynolds following notification that [facility] does not have any beds.     Interventions:  0039 Albuterol 5 mg nebulization   0111 dextrose 50 % injection 25 g IV  0112 Insulin regular 5 units IV  0120 sodium bicarbonate 8.4 % injection 50 mEq IV  0127 Lasix 60 mg IV  0133 dextrose 10% infusion IV  0144 Albuterol 5 mg nebulization    Disposition:  The patient was discharged to home.     Impression & Plan   Medical Decision Making:  Misael Fischer is a 75 year old male who presented to the ED with abnormal labs. Patient with abnormal labs. Patient with potassium of 6.7 as outpatient. Repeated in ED and same. Has NAE on CKD. REviewed his records and hospice recommended by [facility] and [place] as end stage. Was told he was not a dialysis candidate. Chest xray with small effusions, doubt pneumonia. Not hypoxic and breathing comfortably on room air. Initially going to admit to [facility]  but hospitalist declined which is reasonable given all  Of his care has been at [facility]. No beds at [facility]. In further discussions with patient, he actually would prefer to be at home. He has hospice appointment in less than 12 hours to discuss this. Potassium improved slightly while in ED.  DIscussed with him that he is at risk of arrhythmia and death from the hyperkalemia. He understands this. Son came to ED and in agreement with discharge home. Discussed that we could admit and probably improve his numbers with treatments but then would be in same situation again on discharge about how much care to continue to provide and need for hospice. Ultimately decided to go home with son and have hospice appt in less than 12 hours. He will stop his potassium supplements and continue his torsemide. He reports being DNR/DNI. He would like to spend what time he has left with family. Certainly can come back to ED if worsens.    Diagnosis:    ICD-10-CM    1. Hyperkalemia  E87.5 UA with Microscopic     Asymptomatic SARS-CoV-2 COVID-19 Virus (Coronavirus) by PCR     Potassium     Glucose by meter     Glucose by meter     Glucose by meter     Glucose by meter     Glucose     Glucose     Glucose by meter     Glucose by meter     Glucose by meter     Glucose by meter     Potassium     CANCELED: Glucose   2. Cardiomyopathy, unspecified type (H)  I42.9    3. Acute renal failure superimposed on chronic kidney disease, unspecified CKD stage, unspecified acute renal failure type (H)  N17.9     N18.9      Scribe Disclosure:  I, Bert Mccurdy, am serving as a scribe at 11:31 PM on 4/12/2021 to document services personally performed by Mely Hilliard MD based on my observations and the provider's statements to me.      Mely Hilliard MD  04/13/21 0529

## 2021-09-18 ENCOUNTER — HEALTH MAINTENANCE LETTER (OUTPATIENT)
Age: 76
End: 2021-09-18

## 2022-02-19 NOTE — PROGRESS NOTES
HISTORY OF PRESENT ILLNESS:  Thank you for allowing me to participate in the care of your delightful patient.  As you know, Paul is a 72-year-old gentleman with a very complicated history including long-standing PVCs and CHF decompensation and noted to have severe RV dysfunction along with a TR.  The patient was also noted to be in atrial flutter as well.  It is unclear what is causing him to have RV dysfunction.  I elected to chemically suppress both for the PVCs and atrial flutter as they may play a role in his RV dysfunction and CHF.  Unfortunately, the amiodarone was ineffective and therefore, I recommended catheter-based ablation for his atrial flutter.        The patient was found to have typical right-sided atrial flutter was ablated uneventfully.  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  There were a few PVCs noted of different morphologies at that time as well rendering ablation of these foci quite challenging. I elected not to radha after them.     The patient does feel much better after the ablation.  I last saw the patient a year ago.  The patient subsequently was noted to be in atrial fibrillation.  In light of that, I recommended amiodarone to enhance the success rate of the cardioversion as it was unsuccessful befodre.  Unfortunately, the patient developed severe hypothyroidism while on amiodarone, for which the dose was reduced.  He is on Synthroid now.      The patient has been feeling tired and fatigued but not so much shortness of breath.  He is able to do most of what he wants to, but again is quite fatigued.  His LV function is reduced now for unclear reasons.  He underwent both a right and left heart catheterization and is scheduled to see Giuliana Terry, a heart failure specialist, coming up soon.      The patient did not have palpitation when he was in atrial flutter, atrial fibrillation and PVCs in the past and it may play a role in his LV dysfunction if he is  having active atrial tachyarrhythmias or PVCs.  In light of that, I would recommend a 2-week ZIO Patch monitor and at the same time, I would stop the amiodarone as well as a beta blocker as it makes him feel worse.        The patient will continue with Eliquis for now and I would have a low threshold to get a cardiac MRI to look for any rare infiltrative cardiac disease and I would defer this to Giuliana.      We will update the patient regarding result of his ZIO Patch monitor and management plan will depend on the results.      cc:      Kevan Bull MD    [facility]   [address] [place] [address]         KHADAR CALVERT MD             D: 2017 11:21   T: 2017 11:54   MT: [id number]      Name:     TREY BURGER   MRN:      [id number]        Account:      [id number]   :      1945           Service Date: 2017      Document: [id number]       no

## 2022-04-30 ENCOUNTER — HEALTH MAINTENANCE LETTER (OUTPATIENT)
Age: 77
End: 2022-04-30

## 2022-09-15 NOTE — PROGRESS NOTES
Service Date: 05/29/2018      HISTORY OF PRESENT ILLNESS:  I had the pleasure of seeing Paul today when he came for concerns regarding recurrent atrial fibrillation.  He is a very pleasant 72-year-old with a history of chronic renal insufficiency with creatinines over 3.  He has obstructive sleep apnea on CPAP therapy and a history of atrial arrhythmias.  In 11/2015, he developed heart failure and was noted to be in atrial flutter.  He underwent KP and DC cardioversion but went back into atrial flutter and was placed on amiodarone.  He underwent atrial flutter and AVNRT ablation in 03/2016.      Unfortunately, he then developed atrial fibrillation, first diagnosed in 11/2016.  He underwent KP which showed evidence of a clot and was anticoagulated with repeat KP in 12/2016.  He then proceeded with DC cardioversion.  He was placed on amiodarone.  Again at that time this caused significant thyroid issues with a TSH of greater than 32.  Amiodarone was decreased.  He was treated by Endocrinology and ultimately amiodarone was discontinued in 11/2017.  It was recommended that given his high CHADS-VASc score, he remain on anticoagulation for life.      Dr. Larkin saw him back in December, at which time he was doing well.  He had been off of amiodarone for roughly 1 month.  A ZIO Patch showed no evidence of atrial fibrillation or atrial flutter.  PVC burden was 3.4%.      In 02/2018, he came into [facility] secondary to significant dizziness.  He was orthostatic and it was noted that he had doubled his dose of torsemide from 5 mg to 10 mg daily secondary to fluid retention/weight gain.  His weight dropped 4 pounds in 24 hours and he developed lightheadedness.  He was seen by Dr. 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  No significant changes were made and it was recommended that his goal weight be roughly 150 pounds.      Finally, we know he  has a history of mild coronary disease based on angiogram done in 11/2017.  He had issues with right-sided heart failure secondary to significant tricuspid regurgitation and a number of years ago was evaluated at [facility] for consideration of a tricuspid valve repair.  He initially was recommended for tricuspid valve repair and/or replacement, but ultimately opted not to proceed with that.  He sees Dr. Castro and in December saw Dr. Wetzel at the  .  At that time Dr. Wetzel reviewed his RV dysfunction and significant tricuspid regurgitation and a right heart catheterization was done 11/2017.  At that time, he was noted to have a cardiac index of only 1.0 and a wedge of 20.  He was noted to have a severely reduced ejection fraction, low flow and at least moderate mitral regurgitation with trabeculated left ventricle.        Cardiac MRI done in 2016 at [facility] showed no evidence of ARVD however, Dr. Wetzel was unsure of contrast was used for consideration of infiltrative disease.  He has chronically had low voltage on EKGs.      When he saw Dr. Wetzel 12/14/2017, he noted that the severely reduced cardiac index did not necessarily match up with the ejection fraction of only 35%, as his creatinine kept climbing.  He suggested a repeat echocardiogram and right heart catheterization, noting that he may benefit from inotropic therapy/LVAD evaluation if his cardiac index remained low.  This was never scheduled as the patient wanted to take some time to review this.  He has not seen Dr. Wetzel since and as above was hospitalized at [facility] in February.      Paul contacted our office last week due to concerns regarding increasing palpitations and feeling like his heart was irregular again.  He was concerned was back in atrial fibrillation.  He wondered if he should restart amiodarone on his own.  It was recommended against it and he was instructed to see me when he returned from a trip to [place].      Paul tells me that  the only thing he can think of that he did differently about 3 weeks ago as he had some wine and he typically does not drink.  He is wondering if that could have flipped him back into atrial fibrillation.  He states that he gets symptoms such as palpitations when he lies on his right side and in the past had gotten trouble with more significant fatigue on while in atrial fibrillation.  He denies any chest pain, pressure or tightness.  Denies edema, orthopnea or PND.  He remains on anticoagulation with Eliquis without interruption.      Angiogram done 11/2017 showed just mild coronary disease with hemodynamics as noted above.  Echocardiogram done 02/2018 showed an EF of 40% (previously 30%-35%), mild to moderate dilatation of the RV, decreased right ventricular systolic function (mild to moderate), 2+ mitral regurgitation, 2+ tricuspid regurgitation and RVSP of 10+ right atrial pressure.  CVP was elevated.      EKG today, which I overread, continues to show atrial fibrillation at 90 beats per minute with multifocal PVCs.        ASSESSMENT AND PLAN:     1.  Recurrent paroxysmal atrial fibrillation.  His symptoms current are not terrible but he notes that he is due to leave for [place] next week and he is worried that as he remains in atrial fibrillation, he could have more trouble with this.      We discussed options including rate control versus anticoagulation.  He previously had done poorly on beta blockers causing significant lightheadedness and dizziness.  I explained that we could always add a calcium channel blocker for continued heart rate control.  We would do this if he was not terribly symptomatic, but as above, he worries that he could have worsening symptoms the longer he is in atrial fibrillation.        We also talked about rate, rhythm control.  Unfortunately, given his significant renal impairment and cardiomyopathy, we cannot use anything really other than the amiodarone therapy, which as above,  caused significant derangements in his thyroid levels.  He does see Dr. Post for Endocrinology and has an appointment with him next week.      At this time, we talked reinitiating amiodarone and trying to get a cardioversion before he left for [facility].  We will work on scheduling with this, but if that is the case, then we would want to load him with amiodarone 200 mg twice daily starting Wednesday and then on Monday undergo DC cardioversion.  He would likely have his amiodarone reduced at that time, given concerns for recurrent thyroid issues.      I will talk to Dr. Larkin but we did discuss risks, benefits and indications of this including but not limited to stroke (which should be mitigated as he tells me he has not missed any doses of Eliquis), surface burns or tachy or bradyarrhythmias requiring further treatment.  He voiced understanding and would like to think about this.      We also talked about restarting amiodarone but just 200 mg daily (without a load) with a plan to see him back as soon as he returns from his 3-week trip to [facility] to determine if he has been able to chemically convert to normal rhythm or if he would need cardioversion at that time.  Dr. Larkin more worried about that option given the concern for significant bradycardia with conversion on amiodarone therapy, but his heart rate tends to be in the 50-70s routinely and he tolerated that without problems.      Paul is going to think about this.  We do have him down on the schedule for [place] on Monday for cardioversion.  He was instructed to continue all of his medications until he determines which course of action he would like to take.      It has been a pleasure to see Paul in clinic.         IMTIAZ ALCANTAR PA-C             D: 2018   T: 2018   MT: JOYCE      Name:     TREY BURGER   MRN:      [id number]        Account:      [id number]   :      1945           Service Date: 2018      Document: [id number]   Siliq Counseling:  I discussed with the patient the risks of Siliq including but not limited to new or worsening depression, suicidal thoughts and behavior, immunosuppression, malignancy, posterior leukoencephalopathy syndrome, and serious infections.  The patient understands that monitoring is required including a PPD at baseline and must alert us or the primary physician if symptoms of infection or other concerning signs are noted. There is also a special program designed to monitor depression which is required with Siliq.

## 2022-11-20 ENCOUNTER — HEALTH MAINTENANCE LETTER (OUTPATIENT)
Age: 77
End: 2022-11-20

## 2023-03-19 NOTE — PROGRESS NOTES
US Guided Peripheral IV    Date/Time: 3/19/2023 2:25 PM  Performed by: Reinaldo Payne PA-C  Authorized by: Reinaldo Payne PA-C     Patient location:  ED  Performed by:  NP/PA  Indications:     Indications: difficulty obtaining IV access    Procedure details:     Location:  Left arm    Catheter size:  20 gauge    Number of attempts:  1    Successful placement: yes    Post-procedure details:     Post-procedure:  Dressing applied    Assessment: free fluid flow and no signs of infiltration      Post-procedure complications: none      Patient tolerance of procedure:   Tolerated well, no immediate complications               Reinaldo Payne PA-C  03/19/23 1437 Ziopatch placed.

## 2023-06-01 ENCOUNTER — HEALTH MAINTENANCE LETTER (OUTPATIENT)
Age: 78
End: 2023-06-01

## 2024-01-04 NOTE — LETTER
12/14/2017      RE: Misael Fischer  [address] [place] [id number]       Dear Colleague,    Thank you for the opportunity to participate in the care of your patient, Misael Fischer, at the [facility] HEART [facility]. Please see a copy of my visit note below.    December 14, 2017    Dear Dr. Castro and Chaya,    I had the please of meeting Mr. Fischer today in the Advanced Heart Failure Transplant Clinic at [facility].   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 He also has chronic kidney disease. He also had a recent abnormal stress test and follow up LHC showed no significant CAD but RHC with severely deranged hemodynamics and and a CI close to 1.0 and a wedge of 20. Also, he has recently diagnosed hypothyroidism with TSH elevated at 32. He was referred to endocrine for this recently and his amiodarone was reduced to 100mg daily. His most recent LV gram suggested severely reduced EF, low flow, at least moderate MR and a trabeculated LV was noted. Non-compaction was suggested, however he did have a cardiac  MRI in 2016 at [facility] and this was not noted at the time and neither was evidence for ARVD. Unclear if contrast was used for possible infiltrative disease. He also did have difficulties uptitrating his cardiac medications and developed symptoms with 25mg of metoprolol succinate.   He did see Dr. Larkin yesterday and amiodarone as well as metoprolol were stopped at the time.   Today he reports dyspnea with exertion but no shortness of breath at rest.  He denies any chest pain or chest heaviness.  He has had no orthopnea or paroxysmal nocturnal dyspnea. He does feel much better since thyroid  replacement has been started and is also better since yesterday.      PAST MEDICAL HISTORY:  Past Medical History:   Diagnosis Date     Atrial flutter (H) 11/23/15     AVNRT (AV jade re-entry tachycardia) (H)     status post     BPH (benign prostatic hyperplasia)      CAD (coronary artery disease)     Abnormal Nuc 2014     Cardiomyopathy (H)     Echo 11/2015- EF 35-40%     CHF (congestive heart failure) (H) 11/25/2015    Echo 11/2015- EF 35-40%      CKD (chronic kidney disease)     not on ACE/ARB due to insufficiency     Elevated PSA      Hypercholesterolemia 7/10/2014     Malaria      Mild pulmonic regurgitation and RV dysfunction by prior echocardiogram      Non-rheumatic tricuspid valve insufficiency      Paroxysmal a-fib (H)      Persistent atrial fibrillation with rapid ventricular response (H)      Pigment deposition      Systolic heart failure (H)     Echo 11/2015- EF 35-40%     Tricuspid regurgitation     Mod 2016     FAMILY HISTORY:  Family History   Problem Relation Age of Onset     C.A.D. Mother      Prostate Cancer Father 73     SOCIAL HISTORY:  Social History     Social History     Marital status:      Spouse name: N/A     Number of children: N/A     Years of education: N/A     Social History Main Topics     Smoking status: Never Smoker     Smokeless tobacco: Never Used     Alcohol use Yes      Comment: rare     Drug use: No     Sexual activity: Not Currently     Other Topics Concern     Caffeine Concern No     Occupational Exposure No     none     Sleep Concern No     Stress Concern No     Weight Concern No     Special Diet Yes     low salt     Exercise Yes     walking     Seat Belt Yes     Social History Narrative     CURRENT MEDICATIONS:  Current Outpatient Prescriptions   Medication     Levothyroxine Sodium 100 MCG CAPS     VITAMIN D, CHOLECALCIFEROL, PO     torsemide (DEMADEX) 5 MG tablet     apixaban ANTICOAGULANT (ELIQUIS) 5 MG tablet     No current facility-administered medications for  "this visit.      ROS:   Constitutional: No fever, chills, or sweats. Weight is 151 lbs 3.2 oz  ENT: No visual disturbance, ear ache, epistaxis, sore throat.   Allergies/Immunologic: Negative.   Respiratory: No cough, hemoptysis.   Cardiovascular: As per HPI.   GI: No nausea, vomiting, hematemesis, melena, or hematochezia.   : No urinary frequency, dysuria, or hematuria.   Integument: Negative.   Psychiatric: Pleasant, no major depression noted  Neuro: No focal neurological deficits noted  Endocrinology: Negative.   Musculoskeletal: As per HPI.      EXAM:  /86 (BP Location: Right arm, Patient Position: Chair, Cuff Size: Adult Regular)  Pulse 54  Ht 1.727 m (5' 8\")  Wt 68.6 kg (151 lb 3.2 oz)  SpO2 100%  BMI 22.99 kg/m2  General: appears comfortable, alert and oriented  Head: normocephalic, atraumatic  Eyes: anicteric sclera, EOMI , PERRL  Neck: no adenopathy  Orophyarynx: moist mucosa, no lesions noted  Heart: regular, S1/S2, no murmurs, rubs or gallop. Estimated JVP at 5 cmH2O  Lungs: CTAB, No wheezing.   Abdomen: soft, non-tender, bowel sounds present, no hepatosplenomegaly  Extremities: No LE edema today  Skin: no open lesions noted  Neuro: grossly non-focal     Labs:  Lab Results   Component Value Date    WBC 5.3 12/14/2017    HGB 17.5 12/14/2017    HCT 53.0 12/14/2017     (L) 12/14/2017     12/14/2017    POTASSIUM 4.8 12/14/2017    CHLORIDE 103 12/14/2017    CO2 27 12/14/2017    BUN 30 12/14/2017    CR 2.17 (H) 12/14/2017    GLC 95 12/14/2017    NTBNPI 3875 (H) 11/28/2015    TROPI 0.125 (HH) 11/26/2015    AST 47 (H) 12/14/2017    ALT 42 12/14/2017    ALKPHOS 123 12/14/2017    BILITOTAL 1.4 (H) 12/14/2017    INR 1.16 (H) 11/17/2017     Echocardiogram reviewed 11/2017:  The visual ejection fraction is estimated at 30-35%.  There is moderate global hypokinesia of the left ventricle.  There is severe inferior and inferolateral wall hypokinesis.  The transmitral spectral Doppler flow pattern " is suggestive of restrictive  physiology.  The right ventricle is moderate to severely dilated.  The right ventricular systolic function is mild to moderately reduced.  The left atrium is moderate to severely dilated.  The right atrium is severely dilated.  There is moderate (2+) mitral regurgitation.  There is mod-severe to severe (3-4+) tricuspid regurgitation. Severity of TR  may underestimate PA pressure.  Dilated inferior vena cava  There is mild (1+) pulmonic valvular regurgitation.  As compared with most recent study, there is deterioration in LVEF, and  worsening of mitral regurgitation.    TTE 3/3/2017:  The left ventricle is normal in size. There is mild concentric left  ventricular hypertrophy. Left ventricular systolic function is low normal. The  visual ejection fraction is estimated at 50-55%. Flattened septum is  consistent with RV pressure/volume overload.  The right ventricle is moderately dilated. Mildly decreased right ventricular  systolic function.  The left atrium is mildly dilated. The right atrium is severely dilated. There  is no color Doppler evidence of an atrial shunt.  Moderate to moderately severe tricuspid regurgitation.  No pericardial effusion.  In direct comparison to the previous TTE dated 03/18/2016, there has been an  interval improvement in left ventricular systolic function. Right ventricular  systolic function and the degree of tricuspid regurgitation appear similar.    Cardiac MRI 3/2016: reviewed    ASSESSMENT AND PLAN:  In summary, patient is a 72 year old male with above complex history here for possible advanced therapy evaluation. He did have great workup in the past but also noted multiple recent changes and adjustments, appropriately, in his medical regimen. He was recetly found to be hypothyroid and was started on replacement. His amiodarone as well as metoprolol were reduced and now dcd. All these can make him feel better. I was very concerned due to his severely  reduced CI, interestingly though this does not necessarily match up with an EF of 35%. His creatinine has also worse today compared to prior reads that is concerning to me. I have discussed all the above with him and suggested to be admitted and undergo repeat TTE as well as right heart cath. I fear MRI may not be feasible due to the elevated/worsening creatinine. He may however benefit from inotrope therapy/LVAD evaluation if his CI remains this low.   After lengthy discussion he noted that he wants to give himself some time due to the recent many medication changes and adjustments. While did not want to get admitted, would be OK having TTE and RHC done in early January. We discussed the risks of this but he ultimately requested to go with this.   WIll have Zio tomorrow, stress test and will have TTE with RHC in early January. I will see him afterwards. Given the med changes recently, I will hold off adjusting meds for now. He understood that I am worried about him and low cardiac index.  He will return immediately should he feel worse.      Follow up:   Within 3 weeks    I appreciate the opportunity to participate in the care of Misael Fischer . Please do not hesitate to contact me with any further questions.    Sincerely,      Mitchell Wetzel MD     [facility] Division of Cardiology        English Bots[place]an Chilean

## (undated) RX ORDER — HEPARIN SODIUM 1000 [USP'U]/ML
INJECTION, SOLUTION INTRAVENOUS; SUBCUTANEOUS
Status: DISPENSED
Start: 2017-11-21

## (undated) RX ORDER — GLYCOPYRROLATE 0.2 MG/ML
INJECTION, SOLUTION INTRAMUSCULAR; INTRAVENOUS
Status: DISPENSED
Start: 2019-06-11

## (undated) RX ORDER — FLUMAZENIL 0.1 MG/ML
INJECTION, SOLUTION INTRAVENOUS
Status: DISPENSED
Start: 2019-06-11

## (undated) RX ORDER — FENTANYL CITRATE 50 UG/ML
INJECTION, SOLUTION INTRAMUSCULAR; INTRAVENOUS
Status: DISPENSED
Start: 2019-06-11

## (undated) RX ORDER — LIDOCAINE HYDROCHLORIDE 40 MG/ML
SOLUTION TOPICAL
Status: DISPENSED
Start: 2019-06-11

## (undated) RX ORDER — LIDOCAINE HYDROCHLORIDE 10 MG/ML
INJECTION, SOLUTION EPIDURAL; INFILTRATION; INTRACAUDAL; PERINEURAL
Status: DISPENSED
Start: 2017-11-21

## (undated) RX ORDER — NALOXONE HYDROCHLORIDE 0.4 MG/ML
INJECTION, SOLUTION INTRAMUSCULAR; INTRAVENOUS; SUBCUTANEOUS
Status: DISPENSED
Start: 2019-06-11

## (undated) RX ORDER — FENTANYL CITRATE 50 UG/ML
INJECTION, SOLUTION INTRAMUSCULAR; INTRAVENOUS
Status: DISPENSED
Start: 2017-11-21